# Patient Record
Sex: FEMALE | Race: WHITE | NOT HISPANIC OR LATINO | URBAN - METROPOLITAN AREA
[De-identification: names, ages, dates, MRNs, and addresses within clinical notes are randomized per-mention and may not be internally consistent; named-entity substitution may affect disease eponyms.]

---

## 2018-01-24 ENCOUNTER — ON CAMPUS - OUTPATIENT (OUTPATIENT)
Dept: URBAN - METROPOLITAN AREA HOSPITAL 77 | Facility: HOSPITAL | Age: 73
End: 2018-01-24

## 2018-01-24 DIAGNOSIS — K57.90 DIVERTICULOSIS OF INTESTINE, PART UNSPECIFIED, WITHOUT PERFO: ICD-10-CM

## 2018-01-24 DIAGNOSIS — K62.1 RECTAL POLYP: ICD-10-CM

## 2018-01-24 DIAGNOSIS — Z12.11 ENCOUNTER FOR SCREENING FOR MALIGNANT NEOPLASM OF COLON: ICD-10-CM

## 2018-01-24 PROCEDURE — 45380 COLONOSCOPY AND BIOPSY: CPT | Performed by: INTERNAL MEDICINE

## 2018-05-18 ENCOUNTER — HOSPITAL ENCOUNTER (OUTPATIENT)
Dept: FAMILY MEDICINE CLINIC | Facility: CLINIC | Age: 73
Setting detail: SPECIMEN
Discharge: HOME | End: 2018-05-18
Attending: FAMILY MEDICINE | Admitting: FAMILY MEDICINE

## 2018-05-18 LAB
ALBUMIN SERPL-MCNC: 3.8 G/DL (ref 3.5–4.8)
ALBUMIN/GLOB SERPL: 1.5 {RATIO} (ref 1–1.7)
ALP SERPL-CCNC: 59 IU/L (ref 32–91)
ALT SERPL-CCNC: 19 IU/L (ref 14–54)
ANION GAP SERPL CALC-SCNC: 9.6 MMOL/L (ref 10–20)
AST SERPL-CCNC: 21 IU/L (ref 15–41)
BILIRUB SERPL-MCNC: 0.6 MG/DL (ref 0.3–1.2)
BUN SERPL-MCNC: 21 MG/DL (ref 8–20)
BUN/CREAT SERPL: 21 (ref 5.4–26.2)
CALCIUM SERPL-MCNC: 10.5 MG/DL (ref 8.9–10.3)
CHLORIDE SERPL-SCNC: 108 MMOL/L (ref 101–111)
CHOLEST SERPL-MCNC: 205 MG/DL
CHOLEST/HDLC SERPL: 4 {RATIO}
CONV CO2: 28 MMOL/L (ref 22–32)
CONV LDL CHOLESTEROL DIRECT: 137 MG/DL (ref 0–100)
CONV TOTAL PROTEIN: 6.4 G/DL (ref 6.1–7.9)
CREAT UR-MCNC: 1 MG/DL (ref 0.4–1)
GLOBULIN UR ELPH-MCNC: 2.6 G/DL (ref 2.5–3.8)
GLUCOSE SERPL-MCNC: 91 MG/DL (ref 65–99)
HDLC SERPL-MCNC: 51 MG/DL
LDLC/HDLC SERPL: 2.7 {RATIO}
LIPID INTERPRETATION: ABNORMAL
POTASSIUM SERPL-SCNC: 4.6 MMOL/L (ref 3.6–5.1)
SODIUM SERPL-SCNC: 141 MMOL/L (ref 136–144)
TRIGL SERPL-MCNC: 100 MG/DL
VLDLC SERPL CALC-MCNC: 17.4 MG/DL

## 2018-07-26 ENCOUNTER — HOSPITAL ENCOUNTER (OUTPATIENT)
Dept: FAMILY MEDICINE CLINIC | Facility: CLINIC | Age: 73
Setting detail: SPECIMEN
Discharge: HOME | End: 2018-07-26
Attending: FAMILY MEDICINE | Admitting: FAMILY MEDICINE

## 2018-07-26 LAB
CHOLEST SERPL-MCNC: 211 MG/DL
CHOLEST/HDLC SERPL: 3.8 {RATIO}
CONV LDL CHOLESTEROL DIRECT: 133 MG/DL (ref 0–100)
HDLC SERPL-MCNC: 55 MG/DL
LDLC/HDLC SERPL: 2.4 {RATIO}
LIPID INTERPRETATION: ABNORMAL
TRIGL SERPL-MCNC: 66 MG/DL
VLDLC SERPL CALC-MCNC: 23.5 MG/DL

## 2019-06-11 ENCOUNTER — HOSPITAL ENCOUNTER (OUTPATIENT)
Dept: FAMILY MEDICINE CLINIC | Facility: CLINIC | Age: 74
Setting detail: SPECIMEN
Discharge: HOME | End: 2019-06-11
Attending: PHYSICIAN ASSISTANT | Admitting: PHYSICIAN ASSISTANT

## 2019-06-11 LAB
BACTERIA SPEC AEROBE CULT: NORMAL
Lab: NORMAL
MICRO REPORT STATUS: NORMAL
SPECIMEN SOURCE: NORMAL

## 2019-07-10 RX ORDER — AMLODIPINE BESYLATE 5 MG/1
TABLET ORAL
Qty: 30 TABLET | Refills: 0 | Status: SHIPPED | OUTPATIENT
Start: 2019-07-10 | End: 2019-08-12 | Stop reason: SDUPTHER

## 2019-07-11 RX ORDER — AMLODIPINE BESYLATE 5 MG/1
TABLET ORAL
Qty: 30 TABLET | Refills: 0 | OUTPATIENT
Start: 2019-07-11

## 2019-08-13 RX ORDER — LORATADINE 10 MG/1
TABLET ORAL EVERY 24 HOURS
COMMUNITY
Start: 2019-06-11 | End: 2019-08-15 | Stop reason: SDUPTHER

## 2019-08-13 RX ORDER — VIT C/B6/B5/MAGNESIUM/HERB 173 50-5-6-5MG
CAPSULE ORAL
COMMUNITY
Start: 2019-06-11 | End: 2020-01-24

## 2019-08-14 RX ORDER — AMLODIPINE BESYLATE 5 MG/1
TABLET ORAL
Qty: 21 TABLET | Refills: 0 | Status: SHIPPED | OUTPATIENT
Start: 2019-08-14 | End: 2019-08-15 | Stop reason: SDUPTHER

## 2019-08-15 RX ORDER — LORATADINE 10 MG/1
10 TABLET ORAL EVERY 24 HOURS
Qty: 21 TABLET | Refills: 0 | Status: SHIPPED | OUTPATIENT
Start: 2019-08-15 | End: 2019-08-26 | Stop reason: SDUPTHER

## 2019-08-15 RX ORDER — AMLODIPINE BESYLATE 5 MG/1
TABLET ORAL
Qty: 21 TABLET | Refills: 0 | Status: SHIPPED | OUTPATIENT
Start: 2019-08-15 | End: 2019-08-26 | Stop reason: SDUPTHER

## 2019-08-26 ENCOUNTER — OFFICE VISIT (OUTPATIENT)
Dept: FAMILY MEDICINE CLINIC | Facility: CLINIC | Age: 74
End: 2019-08-26

## 2019-08-26 VITALS
TEMPERATURE: 98.7 F | OXYGEN SATURATION: 100 % | RESPIRATION RATE: 12 BRPM | BODY MASS INDEX: 27.14 KG/M2 | HEART RATE: 69 BPM | DIASTOLIC BLOOD PRESSURE: 82 MMHG | HEIGHT: 64 IN | SYSTOLIC BLOOD PRESSURE: 147 MMHG | WEIGHT: 159 LBS

## 2019-08-26 DIAGNOSIS — E55.9 VITAMIN D DEFICIENCY: ICD-10-CM

## 2019-08-26 DIAGNOSIS — E78.2 MIXED HYPERLIPIDEMIA: ICD-10-CM

## 2019-08-26 DIAGNOSIS — Z12.39 BREAST CANCER SCREENING: ICD-10-CM

## 2019-08-26 DIAGNOSIS — I10 ESSENTIAL HYPERTENSION: Primary | ICD-10-CM

## 2019-08-26 PROBLEM — M51.369 DEGENERATION OF INTERVERTEBRAL DISC OF LUMBAR REGION: Status: ACTIVE | Noted: 2019-08-26

## 2019-08-26 PROBLEM — I34.0 MITRAL REGURGITATION: Status: ACTIVE | Noted: 2019-08-26

## 2019-08-26 PROBLEM — IMO0002 BODY MASS INDEX (BMI) OF 25.0 TO 29.9: Status: ACTIVE | Noted: 2018-10-25

## 2019-08-26 PROBLEM — M51.36 DEGENERATION OF INTERVERTEBRAL DISC OF LUMBAR REGION: Status: ACTIVE | Noted: 2019-08-26

## 2019-08-26 PROBLEM — J02.9 ACUTE PHARYNGITIS: Status: ACTIVE | Noted: 2018-03-26

## 2019-08-26 PROBLEM — E78.5 DYSLIPIDEMIA: Status: ACTIVE | Noted: 2019-08-26

## 2019-08-26 PROBLEM — Z23 INFLUENZA VACCINATION GIVEN: Status: ACTIVE | Noted: 2018-10-25

## 2019-08-26 PROBLEM — C67.9 BLADDER CANCER (HCC): Status: ACTIVE | Noted: 2017-02-16

## 2019-08-26 PROBLEM — J31.0 RHINITIS: Status: ACTIVE | Noted: 2018-03-26

## 2019-08-26 PROBLEM — R82.90 ABNORMAL URINALYSIS: Status: ACTIVE | Noted: 2019-06-11

## 2019-08-26 PROBLEM — M54.50 LOW BACK PAIN: Status: ACTIVE | Noted: 2019-06-11

## 2019-08-26 PROBLEM — J98.4 DISORDER OF LUNG: Status: ACTIVE | Noted: 2019-08-26

## 2019-08-26 PROBLEM — J32.9 SINUS INFECTION: Status: ACTIVE | Noted: 2018-03-26

## 2019-08-26 PROBLEM — C64.9 CARCINOMA, RENAL CELL: Status: ACTIVE | Noted: 2019-08-26

## 2019-08-26 PROCEDURE — 99213 OFFICE O/P EST LOW 20 MIN: CPT | Performed by: FAMILY MEDICINE

## 2019-08-26 RX ORDER — CETIRIZINE HYDROCHLORIDE 10 MG/1
10 TABLET ORAL DAILY
COMMUNITY
End: 2021-07-27

## 2019-08-26 RX ORDER — AMLODIPINE BESYLATE 5 MG/1
TABLET ORAL
Qty: 90 TABLET | Refills: 0 | Status: SHIPPED | OUTPATIENT
Start: 2019-08-26 | End: 2019-11-26 | Stop reason: SDUPTHER

## 2019-08-26 RX ORDER — HYDROXYZINE PAMOATE 25 MG/1
25 CAPSULE ORAL 3 TIMES DAILY PRN
Qty: 90 CAPSULE | Refills: 3 | Status: SHIPPED | OUTPATIENT
Start: 2019-08-26 | End: 2020-09-29

## 2019-08-26 RX ORDER — HYDROXYZINE PAMOATE 25 MG/1
1 CAPSULE ORAL 3 TIMES DAILY PRN
Refills: 0 | COMMUNITY
Start: 2019-06-28 | End: 2019-08-26 | Stop reason: SDUPTHER

## 2019-09-03 ENCOUNTER — CLINICAL SUPPORT (OUTPATIENT)
Dept: FAMILY MEDICINE CLINIC | Facility: CLINIC | Age: 74
End: 2019-09-03

## 2019-09-03 DIAGNOSIS — R73.9 HYPERGLYCEMIA: ICD-10-CM

## 2019-09-03 DIAGNOSIS — E78.2 MIXED HYPERLIPIDEMIA: ICD-10-CM

## 2019-09-03 DIAGNOSIS — Z13.1 DIABETES MELLITUS SCREENING: Primary | ICD-10-CM

## 2019-09-03 DIAGNOSIS — I10 ESSENTIAL HYPERTENSION: ICD-10-CM

## 2019-09-03 DIAGNOSIS — E55.9 VITAMIN D DEFICIENCY: ICD-10-CM

## 2019-09-03 LAB
ALBUMIN SERPL-MCNC: 4 G/DL (ref 3.5–4.8)
ALBUMIN/GLOB SERPL: 1.9 G/DL (ref 1–1.7)
ALP SERPL-CCNC: 56 U/L (ref 32–91)
ALT SERPL W P-5'-P-CCNC: 16 U/L (ref 14–54)
ANION GAP SERPL CALCULATED.3IONS-SCNC: 14.3 MMOL/L (ref 5–15)
ARTICHOKE IGE QN: 155 MG/DL (ref 0–100)
AST SERPL-CCNC: 17 U/L (ref 15–41)
BILIRUB SERPL-MCNC: 0.9 MG/DL (ref 0.3–1.2)
BUN BLD-MCNC: 20 MG/DL (ref 8–20)
BUN/CREAT SERPL: 20 (ref 5.4–26.2)
CALCIUM SPEC-SCNC: 10 MG/DL (ref 8.9–10.3)
CHLORIDE SERPL-SCNC: 103 MMOL/L (ref 101–111)
CHOLEST SERPL-MCNC: 234 MG/DL
CO2 SERPL-SCNC: 26 MMOL/L (ref 22–32)
CREAT BLD-MCNC: 1 MG/DL (ref 0.4–1)
GFR SERPL CREATININE-BSD FRML MDRD: 55 ML/MIN/1.73
GLOBULIN UR ELPH-MCNC: 2.1 GM/DL (ref 2.5–3.8)
GLUCOSE BLD-MCNC: 90 MG/DL (ref 65–99)
HDLC SERPL QL: 3.9
HDLC SERPL-MCNC: 60 MG/DL
LDLC/HDLC SERPL: 2.69 {RATIO}
POTASSIUM BLD-SCNC: 5.3 MMOL/L (ref 3.6–5.1)
PROT SERPL-MCNC: 6.1 G/DL (ref 6.1–7.9)
SODIUM BLD-SCNC: 138 MMOL/L (ref 136–144)
TRIGL SERPL-MCNC: 64 MG/DL
VLDLC SERPL-MCNC: 12.8 MG/DL

## 2019-09-03 PROCEDURE — 80053 COMPREHEN METABOLIC PANEL: CPT | Performed by: FAMILY MEDICINE

## 2019-09-03 PROCEDURE — 82306 VITAMIN D 25 HYDROXY: CPT | Performed by: FAMILY MEDICINE

## 2019-09-03 PROCEDURE — 36415 COLL VENOUS BLD VENIPUNCTURE: CPT | Performed by: FAMILY MEDICINE

## 2019-09-03 PROCEDURE — 83036 HEMOGLOBIN GLYCOSYLATED A1C: CPT | Performed by: FAMILY MEDICINE

## 2019-09-03 PROCEDURE — 80061 LIPID PANEL: CPT | Performed by: FAMILY MEDICINE

## 2019-09-04 LAB — HBA1C MFR BLD: 5.2 % (ref 3.5–5.6)

## 2019-09-05 ENCOUNTER — HOSPITAL ENCOUNTER (OUTPATIENT)
Dept: MAMMOGRAPHY | Facility: HOSPITAL | Age: 74
Discharge: HOME | End: 2019-09-05
Admitting: FAMILY MEDICINE

## 2019-09-05 DIAGNOSIS — Z12.39 BREAST CANCER SCREENING: ICD-10-CM

## 2019-09-05 DIAGNOSIS — E87.5 HYPERKALEMIA: Primary | ICD-10-CM

## 2019-09-05 LAB — 25(OH)D3 SERPL-MCNC: 41.9 NG/ML (ref 30–100)

## 2019-09-05 PROCEDURE — 77063 BREAST TOMOSYNTHESIS BI: CPT

## 2019-09-05 PROCEDURE — 77067 SCR MAMMO BI INCL CAD: CPT

## 2019-09-11 ENCOUNTER — CLINICAL SUPPORT (OUTPATIENT)
Dept: FAMILY MEDICINE CLINIC | Facility: CLINIC | Age: 74
End: 2019-09-11

## 2019-09-11 DIAGNOSIS — E87.5 HYPERKALEMIA: ICD-10-CM

## 2019-09-11 LAB
ANION GAP SERPL CALCULATED.3IONS-SCNC: 12 MMOL/L (ref 5–15)
BUN BLD-MCNC: 18 MG/DL (ref 8–20)
BUN/CREAT SERPL: 18 (ref 5.4–26.2)
CALCIUM SPEC-SCNC: 10 MG/DL (ref 8.9–10.3)
CHLORIDE SERPL-SCNC: 104 MMOL/L (ref 101–111)
CO2 SERPL-SCNC: 29 MMOL/L (ref 22–32)
CREAT BLD-MCNC: 1 MG/DL (ref 0.4–1)
GFR SERPL CREATININE-BSD FRML MDRD: 55 ML/MIN/1.73
GLUCOSE BLD-MCNC: 70 MG/DL (ref 65–99)
POTASSIUM BLD-SCNC: 5 MMOL/L (ref 3.6–5.1)
SODIUM BLD-SCNC: 140 MMOL/L (ref 136–144)

## 2019-09-11 PROCEDURE — 80048 BASIC METABOLIC PNL TOTAL CA: CPT | Performed by: FAMILY MEDICINE

## 2019-09-11 PROCEDURE — 36415 COLL VENOUS BLD VENIPUNCTURE: CPT | Performed by: FAMILY MEDICINE

## 2019-09-13 ENCOUNTER — HOSPITAL ENCOUNTER (OUTPATIENT)
Dept: OTHER | Facility: HOSPITAL | Age: 74
Discharge: HOME | End: 2019-09-13

## 2019-09-13 DIAGNOSIS — Z00.6 EXAMINATION FOR NORMAL COMPARISON OR CONTROL IN CLINICAL RESEARCH: ICD-10-CM

## 2019-11-26 RX ORDER — AMLODIPINE BESYLATE 5 MG/1
TABLET ORAL
Qty: 90 TABLET | Refills: 0 | Status: SHIPPED | OUTPATIENT
Start: 2019-11-26 | End: 2020-01-24 | Stop reason: SDUPTHER

## 2019-11-26 NOTE — TELEPHONE ENCOUNTER
Last visit: 9/11/19  Next visit:2/24/2020  Last labs: 9/11/19    Rx requested:amLODIPine  Pharmacy: [facility] in [place]

## 2019-12-03 RX ORDER — AMLODIPINE BESYLATE 5 MG/1
TABLET ORAL
Qty: 90 TABLET | Refills: 0 | OUTPATIENT
Start: 2019-12-03

## 2020-01-23 PROBLEM — Z12.31 OTHER SCREENING MAMMOGRAM: Status: ACTIVE | Noted: 2018-07-20

## 2020-01-23 PROBLEM — R00.2 PALPITATIONS: Status: ACTIVE | Noted: 2019-03-18

## 2020-01-23 PROBLEM — N39.0 URINARY TRACT INFECTION: Status: ACTIVE | Noted: 2019-06-11

## 2020-01-24 ENCOUNTER — OFFICE VISIT (OUTPATIENT)
Dept: FAMILY MEDICINE CLINIC | Facility: CLINIC | Age: 75
End: 2020-01-24

## 2020-01-24 VITALS
BODY MASS INDEX: 27.14 KG/M2 | TEMPERATURE: 98.4 F | HEIGHT: 64 IN | DIASTOLIC BLOOD PRESSURE: 80 MMHG | WEIGHT: 159 LBS | OXYGEN SATURATION: 100 % | SYSTOLIC BLOOD PRESSURE: 158 MMHG | HEART RATE: 82 BPM | RESPIRATION RATE: 14 BRPM

## 2020-01-24 DIAGNOSIS — I10 ELEVATED BLOOD PRESSURE READING WITH DIAGNOSIS OF HYPERTENSION: Primary | ICD-10-CM

## 2020-01-24 DIAGNOSIS — C64.9 RENAL CELL CARCINOMA, UNSPECIFIED LATERALITY (HCC): ICD-10-CM

## 2020-01-24 DIAGNOSIS — C67.9 MALIGNANT NEOPLASM OF URINARY BLADDER, UNSPECIFIED SITE (HCC): ICD-10-CM

## 2020-01-24 PROCEDURE — 99213 OFFICE O/P EST LOW 20 MIN: CPT | Performed by: FAMILY MEDICINE

## 2020-01-24 RX ORDER — BIOTIN 10 MG
1 TABLET ORAL DAILY
COMMUNITY
End: 2022-09-20

## 2020-01-24 RX ORDER — AMLODIPINE BESYLATE 5 MG/1
TABLET ORAL
Qty: 90 TABLET | Refills: 1 | Status: SHIPPED | OUTPATIENT
Start: 2020-01-24 | End: 2020-09-03

## 2020-01-24 NOTE — PROGRESS NOTES
Subjective   Sandra Gonzalez is a 74 y.o. female.     Chief Complaint   Patient presents with   • Alopecia     Her iron level was too high, go over blood work results.          Current Outpatient Medications:   •  amLODIPine (NORVASC) 5 MG tablet, TAKE 1 TABLET BY MOUTH ONE TIME A DAY, Disp: 90 tablet, Rfl: 1  •  B Complex-C (SUPER B COMPLEX PO), Take 1 tablet by mouth Daily., Disp: , Rfl:   •  Biotin 10 MG tablet, Take 1 tablet by mouth Daily., Disp: , Rfl:   •  calcium carb-cholecalciferol (CALCIUM 600+D) 600-800 MG-UNIT tablet, Daily., Disp: , Rfl:   •  cetirizine (zyrTEC) 10 MG tablet, Take 10 mg by mouth Daily., Disp: , Rfl:   •  Cholecalciferol (KP VITAMIN D3) 2000 units capsule, 1 capsule Daily., Disp: , Rfl:   •  hydrOXYzine pamoate (VISTARIL) 25 MG capsule, Take 1 capsule by mouth 3 (Three) Times a Day As Needed for Itching or Allergies., Disp: 90 capsule, Rfl: 3    Past Medical History:   Diagnosis Date   • Achilles tendinitis    • Allergic rhinitis    • Bladder cancer (CMS/HCC)     2016/ 2019 Bladder Tumor Sx   • DDD (degenerative disc disease), lumbar    • Hyperlipidemia    • Hypertension    • Lower back pain    • Mitral valve prolapse    • Pulmonary nodule    • Rheumatic fever    • Vitamin D deficiency        Past Surgical History:   Procedure Laterality Date   • ADENOIDECTOMY     • APPENDECTOMY     • BLADDER TUMOR EXCISION  05/2019    x 2   • CHOLECYSTECTOMY     • COLONOSCOPY  07/2018    recheck in 2023 at [facility]   • NEPHRECTOMY      left nephrectomy   • TONSILLECTOMY     • TOTAL ABDOMINAL HYSTERECTOMY WITH SALPINGO OOPHORECTOMY         Family History   Problem Relation Age of Onset   • Cancer Mother         gallbladder cancer   • Cancer Father         cancerour tumor in brain   • No Known Problems Sister    • No Known Problems Brother        Social History     Socioeconomic History   • Marital status:      Spouse name: Not on file   • Number of children: Not on file   • Years of education: Not on  file   • Highest education level: Not on file   Tobacco Use   • Smoking status: Never Smoker   • Smokeless tobacco: Never Used   Substance and Sexual Activity   • Alcohol use: No     Frequency: Never   • Drug use: No   • Sexual activity: Defer       73 y/o C female here for f/u on labs done by DERM-----    Pt saw them for Alopecia and they found her Ferritin was too high-----she was told to use Rogaine but they also wanted her to f/u w/ PCP; Once pt looked into the med, she decided not to try it    Pt sees Urology for f/u on her RCC/ Bladder Cancer and since they found her second bladder cancer, she will see them annually (it was removed)    Pt's BP was in the normal range while in [place] recently when she went to  (She was given Augmentin and got hives so then they gave her 3 days of ZPak) ---- Pt now taking some otc Pseud        The following portions of the patient's history were reviewed and updated as appropriate: allergies, current medications, past family history, past medical history, past social history, past surgical history and problem list.    Review of Systems   Constitutional: Negative for activity change, fatigue and unexpected weight gain.   HENT: Positive for congestion.    Respiratory: Negative for cough, chest tightness and shortness of breath.    Cardiovascular: Negative for chest pain, palpitations and leg swelling.   Musculoskeletal: Negative for arthralgias and myalgias.   Skin: Negative for rash.   Neurological: Negative for dizziness, facial asymmetry, speech difficulty, weakness, light-headedness, headache, memory problem and confusion.       Vitals:    01/24/20 1300   BP: 158/80   Pulse: 82   Resp: 14   Temp: 98.4 °F (36.9 °C)   SpO2: 100%       Objective   Physical Exam   Constitutional: She is oriented to person, place, and time. She appears well-developed and well-nourished.   HENT:   Head: Normocephalic and atraumatic.   Neck: Normal range of motion. Neck supple.   Cardiovascular: Normal  rate, regular rhythm, normal heart sounds and intact distal pulses.   No murmur heard.  Pulmonary/Chest: Effort normal and breath sounds normal. No respiratory distress.   Musculoskeletal: She exhibits no edema.   Neurological: She is alert and oriented to person, place, and time. No cranial nerve deficit.   Skin: Skin is warm and dry. No rash noted.   Psychiatric: She has a normal mood and affect. Her behavior is normal. Judgment and thought content normal.   Nursing note and vitals reviewed.        Assessment/Plan   Sandra was seen today for alopecia.    Diagnoses and all orders for this visit:    Elevated blood pressure reading with diagnosis of hypertension  -     Ferritin    Renal cell carcinoma, unspecified laterality (CMS/HCC)  -     Calcium, Ionized; Future    Malignant neoplasm of urinary bladder, unspecified site (CMS/HCC)    Other orders  -     amLODIPine (NORVASC) 5 MG tablet; TAKE 1 TABLET BY MOUTH ONE TIME A DAY

## 2020-01-28 ENCOUNTER — TELEPHONE (OUTPATIENT)
Dept: FAMILY MEDICINE CLINIC | Facility: CLINIC | Age: 75
End: 2020-01-28

## 2020-01-28 NOTE — TELEPHONE ENCOUNTER
----- Message from Natalie Garcia DO sent at 1/26/2020  5:33 PM EST -----  Call pt and see if she is willing to repeat the abn labs and if still off, can f/u w/ HEME/ ONC to see what they think

## 2020-01-28 NOTE — TELEPHONE ENCOUNTER
Patient was notified and was routed up front to [facility] to schedule the patient for the blood work.

## 2020-01-29 ENCOUNTER — CLINICAL SUPPORT (OUTPATIENT)
Dept: FAMILY MEDICINE CLINIC | Facility: CLINIC | Age: 75
End: 2020-01-29

## 2020-01-29 DIAGNOSIS — C64.9 RENAL CELL CARCINOMA, UNSPECIFIED LATERALITY (HCC): ICD-10-CM

## 2020-01-29 PROCEDURE — 82728 ASSAY OF FERRITIN: CPT | Performed by: FAMILY MEDICINE

## 2020-01-29 PROCEDURE — 82330 ASSAY OF CALCIUM: CPT | Performed by: FAMILY MEDICINE

## 2020-01-29 PROCEDURE — 36415 COLL VENOUS BLD VENIPUNCTURE: CPT | Performed by: FAMILY MEDICINE

## 2020-01-30 ENCOUNTER — TELEPHONE (OUTPATIENT)
Dept: FAMILY MEDICINE CLINIC | Facility: CLINIC | Age: 75
End: 2020-01-30

## 2020-01-30 ENCOUNTER — PATIENT MESSAGE (OUTPATIENT)
Dept: FAMILY MEDICINE CLINIC | Facility: CLINIC | Age: 75
End: 2020-01-30

## 2020-01-30 DIAGNOSIS — E83.52 SERUM CALCIUM ELEVATED: Primary | ICD-10-CM

## 2020-01-30 DIAGNOSIS — C64.9 RENAL CELL CARCINOMA, UNSPECIFIED LATERALITY (HCC): ICD-10-CM

## 2020-01-30 DIAGNOSIS — R79.89 ELEVATED FERRITIN LEVEL: ICD-10-CM

## 2020-01-30 LAB
CA-I BLD-MCNC: 6.2 MG/DL (ref 4.6–5.4)
CA-I SERPL ISE-MCNC: 1.56 MMOL/L (ref 1.15–1.35)
FERRITIN SERPL-MCNC: 279 NG/ML (ref 13–150)

## 2020-01-30 NOTE — TELEPHONE ENCOUNTER
Patient was notified of the lab results. Patient sees  for urology and she does not have a Onc/Hem doctor but will go to one if you refer her. I told the patient I would let  know and they will call her to schedule appt.

## 2020-01-30 NOTE — TELEPHONE ENCOUNTER
Pt read on [employer] about the lab results and stopped in to ask where she is being referred to because she is going out of town soon

## 2020-01-31 ENCOUNTER — TELEPHONE (OUTPATIENT)
Dept: ONCOLOGY | Facility: CLINIC | Age: 75
End: 2020-01-31

## 2020-02-17 ENCOUNTER — CONSULT (OUTPATIENT)
Dept: ONCOLOGY | Facility: CLINIC | Age: 75
End: 2020-02-17

## 2020-02-17 ENCOUNTER — APPOINTMENT (OUTPATIENT)
Dept: LAB | Facility: HOSPITAL | Age: 75
End: 2020-02-17

## 2020-02-17 VITALS
BODY MASS INDEX: 27.49 KG/M2 | WEIGHT: 161 LBS | HEIGHT: 64 IN | DIASTOLIC BLOOD PRESSURE: 91 MMHG | RESPIRATION RATE: 18 BRPM | SYSTOLIC BLOOD PRESSURE: 156 MMHG | TEMPERATURE: 99.1 F | HEART RATE: 83 BPM

## 2020-02-17 DIAGNOSIS — R79.89 HIGH SERUM CALCIUM: Primary | ICD-10-CM

## 2020-02-17 DIAGNOSIS — R79.89 HIGH SERUM FERRITIN: ICD-10-CM

## 2020-02-17 DIAGNOSIS — Z85.9 HISTORY OF CANCER: ICD-10-CM

## 2020-02-17 LAB
BASOPHILS # BLD AUTO: 0.03 10*3/MM3 (ref 0–0.2)
BASOPHILS NFR BLD AUTO: 0.4 % (ref 0–1.5)
CRP SERPL-MCNC: 0.06 MG/DL (ref 0–0.5)
DEPRECATED RDW RBC AUTO: 44.1 FL (ref 37–54)
EOSINOPHIL # BLD AUTO: 0.14 10*3/MM3 (ref 0–0.4)
EOSINOPHIL NFR BLD AUTO: 1.8 % (ref 0.3–6.2)
ERYTHROCYTE [DISTWIDTH] IN BLOOD BY AUTOMATED COUNT: 13.3 % (ref 12.3–15.4)
ERYTHROCYTE [SEDIMENTATION RATE] IN BLOOD: 10 MM/HR (ref 0–30)
HCT VFR BLD AUTO: 42.1 % (ref 34–46.6)
HGB BLD-MCNC: 13.9 G/DL (ref 12–15.9)
LYMPHOCYTES # BLD AUTO: 2.54 10*3/MM3 (ref 0.7–3.1)
LYMPHOCYTES NFR BLD AUTO: 32.6 % (ref 19.6–45.3)
MCH RBC QN AUTO: 30.7 PG (ref 26.6–33)
MCHC RBC AUTO-ENTMCNC: 33 G/DL (ref 31.5–35.7)
MCV RBC AUTO: 92.9 FL (ref 79–97)
MONOCYTES # BLD AUTO: 0.55 10*3/MM3 (ref 0.1–0.9)
MONOCYTES NFR BLD AUTO: 7.1 % (ref 5–12)
NEUTROPHILS # BLD AUTO: 4.53 10*3/MM3 (ref 1.7–7)
NEUTROPHILS NFR BLD AUTO: 58.1 % (ref 42.7–76)
PLATELET # BLD AUTO: 224 10*3/MM3 (ref 140–450)
PMV BLD AUTO: 9.7 FL (ref 6–12)
PTH-INTACT SERPL-MCNC: 121.7 PG/ML (ref 15–65)
RBC # BLD AUTO: 4.53 10*6/MM3 (ref 3.77–5.28)
WBC NRBC COR # BLD: 7.79 10*3/MM3 (ref 3.4–10.8)

## 2020-02-17 PROCEDURE — 36415 COLL VENOUS BLD VENIPUNCTURE: CPT | Performed by: INTERNAL MEDICINE

## 2020-02-17 PROCEDURE — 85025 COMPLETE CBC W/AUTO DIFF WBC: CPT | Performed by: INTERNAL MEDICINE

## 2020-02-17 PROCEDURE — 83970 ASSAY OF PARATHORMONE: CPT | Performed by: NURSE PRACTITIONER

## 2020-02-17 PROCEDURE — 99205 OFFICE O/P NEW HI 60 MIN: CPT | Performed by: INTERNAL MEDICINE

## 2020-02-17 PROCEDURE — 85652 RBC SED RATE AUTOMATED: CPT | Performed by: NURSE PRACTITIONER

## 2020-02-17 PROCEDURE — 86140 C-REACTIVE PROTEIN: CPT | Performed by: NURSE PRACTITIONER

## 2020-02-17 PROCEDURE — 82784 ASSAY IGA/IGD/IGG/IGM EACH: CPT | Performed by: NURSE PRACTITIONER

## 2020-02-17 NOTE — PROGRESS NOTES
Hematology/Oncology Outpatient Consultation    Patient name: Sandra Gonzalez  : 1945  MRN: [id number]  Primary Care Physician: Natalie Garcia DO  Referring Physician: Natalie Garcia DO  Reason For Consult:     Chief Complaint   Patient presents with   • Consult     High Ferritin   • Consult     High Calcium       History of Present Illness:    This is a 74-year-old female who is here today due to elevated calcium level in her blood.  Patient states that she has been on oral calcium supplementation along with vitamin D.  On a routine biochemical testing she was found to have elevated calcium to 11.  An ionized calcium level was noted to be 1.5.  Patient denies any night sweats or fatigue symptoms.  She has been on weight watchers diet and has lost approximately more than 20 pounds intentionally.  She has occasional right hip pain right joint aching pain.  Overall she feels well otherwise.  Also on her routine lab test was found to have an elevated ferritin to 279 on 2020.  She has no history of iron overload issues.  Her serum iron and  serum iron saturation were normal.  There is no family history of liver cancer, cirrhosis of the liver.    We have her labs from   Which showed her white count was 6.2, hemoglobin 13.3 and platelets were 236, her differentials where 57% neutrophils, 34% lymphocytes, there was no monocytosis, eosinophilia or basophilia.  BUN was 17, creatinine 0.93 and serum calcium was 11.  She has normal total protein and albumin levels.  Liver function tests were also unremarkable.  Serum iron was normal at 86, iron saturation was normal at 28.  TSH was normal and vitamin D1 was also normal at 43.1.  Sed rate was 6.    Patient also has a history of bladder cancer, kidney cancer and cervical cancer.  Past Medical History:   Diagnosis Date   • Achilles tendinitis    • Allergic rhinitis    • Bladder cancer (CMS/HCC)     2019 Bladder Tumor Sx   • Cancer of kidney  (CMS/HCC)    • DDD (degenerative disc disease), lumbar    • Hyperlipidemia    • Hypertension    • Lower back pain    • Mitral valve prolapse    • Pulmonary nodule    • Rheumatic fever    • Vitamin D deficiency        Past Surgical History:   Procedure Laterality Date   • ADENOIDECTOMY     • APPENDECTOMY     • BLADDER TUMOR EXCISION  05/2019    x 2   • CHOLECYSTECTOMY     • COLONOSCOPY  07/2018    [facility]check in 2023 at [facility]   • NEPHRECTOMY  09/2009    left nephrectomy   • TONSILLECTOMY     • TOTAL ABDOMINAL HYSTERECTOMY WITH SALPINGO OOPHORECTOMY           Current Outpatient Medications:   •  amLODIPine (NORVASC) 5 MG tablet, TAKE 1 TABLET BY MOUTH ONE TIME A DAY, Disp: 90 tablet, Rfl: 1  •  B Complex-C (SUPER B COMPLEX PO), Take 1 tablet by mouth Daily., Disp: , Rfl:   •  Biotin 10 MG tablet, Take 1 tablet by mouth Daily., Disp: , Rfl:   •  calcium carb-cholecalciferol (CALCIUM 600+D) 600-800 MG-UNIT tablet, Daily., Disp: , Rfl:   •  cetirizine (zyrTEC) 10 MG tablet, Take 10 mg by mouth Daily., Disp: , Rfl:   •  Cholecalciferol (KP VITAMIN D3) 2000 units capsule, 1 capsule Daily., Disp: , Rfl:   •  hydrOXYzine pamoate (VISTARIL) 25 MG capsule, Take 1 capsule by mouth 3 (Three) Times a Day As Needed for Itching or Allergies., Disp: 90 capsule, Rfl: 3    Allergies   Allergen Reactions   • B.F.I. Rash     [employer] [place]   • Benzocaine Rash   • Amoxicillin-Pot Clavulanate Hives       Immunization History   Administered Date(s) Administered   • Fluzone High Dose =>65 Years ([employer] ONLY) 09/24/2019   • Pneumococcal Polysaccharide (PPSV23) 01/01/2013   • Zostavax 11/01/2014       Family History   Problem Relation Age of Onset   • Cancer Mother         gallbladder cancer   • Brain cancer Father 77   • No Known Problems Sister    • No Known Problems Brother        Cancer-related family history includes Brain cancer (age of onset: 77) in her father; Cancer in her mother.    Social History     Tobacco Use   • Smoking status:  "Never Smoker   • Smokeless tobacco: Never Used   Substance Use Topics   • Alcohol use: No     Frequency: Never   • Drug use: No       ROS:    Review of Systems   Constitutional: Negative for chills and fever.   HENT: Negative for ear pain, mouth sores, nosebleeds and sore throat.    Eyes: Negative for photophobia and visual disturbance.   Respiratory: Negative for wheezing and stridor.    Cardiovascular: Negative for chest pain and palpitations.   Gastrointestinal: Negative for abdominal pain, diarrhea, nausea and vomiting.   Endocrine: Negative for cold intolerance and heat intolerance.   Genitourinary: Negative for dysuria and hematuria.   Musculoskeletal: Negative for joint swelling and neck stiffness.   Skin: Negative for color change and rash.   Neurological: Negative for seizures and syncope.   Hematological: Negative for adenopathy.        No obvious bleeding   Psychiatric/Behavioral: Negative for agitation, confusion and hallucinations.   All other systems reviewed and are negative.    Subjective:  The patient is here for a consult appointment for high ferritin and calcium. The patient states that she quit taking the calcium supplements when she was told to stop. She denies any symptoms or problems. The patient stated she has had cancer four times in the past. 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         Objective:    Vitals:    02/17/20 1551   BP: 156/91   Pulse: 83   Resp: 18   Temp: 99.1 °F (37.3 °C)   Weight: 73 kg (161 lb)   Height: 162.6 cm (64\")   PainSc: 0-No pain       ECOG  (0) Fully active, able to carry on all predisease performance without restriction    Physical Exam:    Physical Exam   Constitutional: She is oriented to person, place, and time. No distress.   HENT:   Head: Normocephalic and atraumatic.   Eyes: Conjunctivae and EOM are normal. Right eye exhibits no discharge. Left eye exhibits no " discharge. No scleral icterus.   Neck: Normal range of motion. Neck supple. No thyromegaly present.   Cardiovascular: Normal rate, regular rhythm and normal heart sounds. Exam reveals no gallop and no friction rub.   Pulmonary/Chest: Effort normal. No stridor. No respiratory distress. She has no wheezes.   Abdominal: Soft. Bowel sounds are normal. She exhibits no mass. There is no tenderness. There is no rebound and no guarding.   Musculoskeletal: Normal range of motion. She exhibits no tenderness.   Lymphadenopathy:     She has no cervical adenopathy.   Neurological: She is alert and oriented to person, place, and time. She exhibits normal muscle tone.   Skin: Skin is warm. No rash noted. She is not diaphoretic. No erythema.   Psychiatric: She has a normal mood and affect. Her behavior is normal.   Nursing note and vitals reviewed.      RECENT LABS  WBC   Date Value Ref Range Status   02/17/2020 7.79 3.40 - 10.80 10*3/mm3 Final     RBC   Date Value Ref Range Status   02/17/2020 4.53 3.77 - 5.28 10*6/mm3 Final     Hemoglobin   Date Value Ref Range Status   02/17/2020 13.9 12.0 - 15.9 g/dL Final     Hematocrit   Date Value Ref Range Status   02/17/2020 42.1 34.0 - 46.6 % Final     MCV   Date Value Ref Range Status   02/17/2020 92.9 79.0 - 97.0 fL Final     MCH   Date Value Ref Range Status   02/17/2020 30.7 26.6 - 33.0 pg Final     MCHC   Date Value Ref Range Status   02/17/2020 33.0 31.5 - 35.7 g/dL Final     RDW   Date Value Ref Range Status   02/17/2020 13.3 12.3 - 15.4 % Final     RDW-SD   Date Value Ref Range Status   02/17/2020 44.1 37.0 - 54.0 fl Final     MPV   Date Value Ref Range Status   02/17/2020 9.7 6.0 - 12.0 fL Final     Platelets   Date Value Ref Range Status   02/17/2020 224 140 - 450 10*3/mm3 Final     Neutrophil %   Date Value Ref Range Status   02/17/2020 58.1 42.7 - 76.0 % Final     Lymphocyte %   Date Value Ref Range Status   02/17/2020 32.6 19.6 - 45.3 % Final     Monocyte %   Date Value Ref  Range Status   02/17/2020 7.1 5.0 - 12.0 % Final     Eosinophil %   Date Value Ref Range Status   02/17/2020 1.8 0.3 - 6.2 % Final     Basophil %   Date Value Ref Range Status   02/17/2020 0.4 0.0 - 1.5 % Final     Neutrophils, Absolute   Date Value Ref Range Status   02/17/2020 4.53 1.70 - 7.00 10*3/mm3 Final     Lymphocytes, Absolute   Date Value Ref Range Status   02/17/2020 2.54 0.70 - 3.10 10*3/mm3 Final     Monocytes, Absolute   Date Value Ref Range Status   02/17/2020 0.55 0.10 - 0.90 10*3/mm3 Final     Eosinophils, Absolute   Date Value Ref Range Status   02/17/2020 0.14 0.00 - 0.40 10*3/mm3 Final     Basophils, Absolute   Date Value Ref Range Status   02/17/2020 0.03 0.00 - 0.20 10*3/mm3 Final       Lab Results   Component Value Date    GLUCOSE 70 09/11/2019    BUN 18 09/11/2019    CREATININE 1.00 09/11/2019    EGFRIFNONA 55 (L) 09/11/2019    BCR 18.0 09/11/2019    K 5.0 09/11/2019    CO2 29.0 09/11/2019    CALCIUM 10.0 09/11/2019    ALBUMIN 4.00 09/03/2019    LABIL2 1.5 05/18/2018    AST 17 09/03/2019    ALT 16 09/03/2019         Assessment/Plan     High serum calcium  - CT chest w contrast  - CT abdomen pelvis w contrast  - PTH, Intact  - Sedimentation Rate  - Hemochromatosis Mutation  - C-reactive Protein  - CBC & Differential  - IgA  - IgG  - IgM  - NM Bone Scan Whole Body  - Protein Electrophoresis, Total  - Immunofixation, Serum  - Immunoglobulin Free LT Chains Blood  - CBC Auto Differential    High serum ferritin  - CT chest w contrast  - CT abdomen pelvis w contrast  - PTH, Intact  - Sedimentation Rate  - Hemochromatosis Mutation  - C-reactive Protein  - CBC & Differential  - IgA  - IgG  - IgM  - NM Bone Scan Whole Body  - Protein Electrophoresis, Total  - Immunofixation, Serum  - Immunoglobulin Free LT Chains Blood  - CBC Auto Differential    History of cancer  - CT chest w contrast  - CT abdomen pelvis w contrast  - PTH, Intact  - Sedimentation Rate  - Hemochromatosis Mutation  - C-reactive  Protein  - CBC & Differential  - IgA  - IgG  - IgM  - NM Bone Scan Whole Body  - Protein Electrophoresis, Total  - Immunofixation, Serum  - Immunoglobulin Free LT Chains Blood  - CBC Auto Differential      1. Hypercalcemia which may be iatrogenic but rule out significant disease including hematologic malignancy, or soft tissue malignancy.  2. Elevated ferritin level which may be reactive versus iron overload syndrome.  Patient has normal serum iron, and iron saturation.  3. Personal history of multiple malignancies including kidney cancer, bladder cancer and cervical cancer.  4. Need to rule out metastatic disease resulting in elevated calcium level.    Plans:    For the hypercalcemia.  Patient would remain off calcium supplementation.  Have also recommended a serum PTH level.  She has history of 3 primary cancers including kidney, bladder and cervical cancer therefore it is important to rule out metastatic disease.  I recommended bone scan, CT scans of the chest abdomen and pelvis.  Monoclonal labs will also be done to rule out gammopathy.    For the elevated ferritin level, I have recommended HFE mutational analysis to further evaluate elevated ferritin level.  Also included are sed rate and C-reactive protein.    Explained the above to patient in detail.  We have reviewed potential differential diagnosis.    Thank you very much for allowing me to participate in the care of Ms. Gonzalez, I will keep you updated on her progress      I have reviewed labs results, imaging, vitals, and medications with the patient today.  Will follow up in 3 weeks  with me.        Patient verbalized understanding and is in agreement of the above plan.

## 2020-02-18 LAB
ALBUMIN SERPL-MCNC: 3.9 G/DL (ref 2.9–4.4)
ALBUMIN/GLOB SERPL: 1.4 {RATIO} (ref 0.7–1.7)
ALPHA1 GLOB FLD ELPH-MCNC: 0.2 G/DL (ref 0–0.4)
ALPHA2 GLOB SERPL ELPH-MCNC: 0.7 G/DL (ref 0.4–1)
B-GLOBULIN SERPL ELPH-MCNC: 1 G/DL (ref 0.7–1.3)
GAMMA GLOB SERPL ELPH-MCNC: 0.9 G/DL (ref 0.4–1.8)
GLOBULIN SER CALC-MCNC: 2.8 G/DL (ref 2.2–3.9)
IGA SERPL-MCNC: 164 MG/DL (ref 64–422)
IGA1 MFR SER: 157 MG/DL (ref 70–400)
IGG SERPL-MCNC: 913 MG/DL (ref 700–1600)
IGG1 SER-MCNC: 935 MG/DL (ref 700–1600)
IGM SERPL-MCNC: 70 MG/DL (ref 40–230)
IGM SERPL-MCNC: 75 MG/DL (ref 26–217)
KAPPA LC SERPL-MCNC: 14.4 MG/L (ref 3.3–19.4)
KAPPA LC/LAMBDA SER: 1.13 {RATIO} (ref 0.26–1.65)
LAMBDA LC FREE SERPL-MCNC: 12.8 MG/L (ref 5.7–26.3)
Lab: NORMAL
M-SPIKE: NORMAL G/DL
PROT PATTERN SERPL IFE-IMP: NORMAL
PROT SERPL-MCNC: 6.7 G/DL (ref 6–8.5)

## 2020-02-20 ENCOUNTER — TELEPHONE (OUTPATIENT)
Dept: ONCOLOGY | Facility: CLINIC | Age: 75
End: 2020-02-20

## 2020-02-20 DIAGNOSIS — R79.89 ELEVATED PTHRP LEVEL: Primary | ICD-10-CM

## 2020-02-20 LAB — HFE GENE MUT ANL BLD/T: NORMAL

## 2020-02-20 NOTE — TELEPHONE ENCOUNTER
----- Message from Cinthya Hall MD sent at 2/19/2020  6:30 PM EST -----  Let patient know that she has an elevated parathyroid hormone level.  I would refer her to endocrinology to evaluate.  Please send in a referral to Dr. ADAMS Claudio.

## 2020-02-20 NOTE — TELEPHONE ENCOUNTER
I called the patient to inform her that we put in a referral to Dr. Claudio's office for endocrinology. I told the patient about her lab results and that Dr. Hall is just wanting to have her evaluated. The patient v/u.

## 2020-02-21 ENCOUNTER — HOSPITAL ENCOUNTER (OUTPATIENT)
Dept: NUCLEAR MEDICINE | Facility: HOSPITAL | Age: 75
Discharge: HOME | End: 2020-02-21

## 2020-02-21 ENCOUNTER — TELEPHONE (OUTPATIENT)
Dept: ONCOLOGY | Facility: CLINIC | Age: 75
End: 2020-02-21

## 2020-02-21 DIAGNOSIS — R79.89 ELEVATED PTHRP LEVEL: Primary | ICD-10-CM

## 2020-02-21 PROCEDURE — 0 TECHNETIUM MEDRONATE KIT: Performed by: NURSE PRACTITIONER

## 2020-02-21 PROCEDURE — 78306 BONE IMAGING WHOLE BODY: CPT

## 2020-02-21 PROCEDURE — A9503 TC99M MEDRONATE: HCPCS | Performed by: NURSE PRACTITIONER

## 2020-02-21 RX ORDER — TC 99M MEDRONATE 20 MG/10ML
27.1 INJECTION, POWDER, LYOPHILIZED, FOR SOLUTION INTRAVENOUS
Status: COMPLETED | OUTPATIENT
Start: 2020-02-21 | End: 2020-02-21

## 2020-02-21 RX ADMIN — TC 99M MEDRONATE 27.1 MILLICURIE: 20 INJECTION, POWDER, LYOPHILIZED, FOR SOLUTION INTRAVENOUS at 10:00

## 2020-02-21 NOTE — TELEPHONE ENCOUNTER
I called and informed the patient that we don't have a way to know what she was getting notifications for however some of her labs from 2/17/2020 were send outs and she may be getting notified as those results come in. The patient v/u and stated she got her bone scan today and was told Dr. Hall would have the results by Monday. I told the patient she would review the scan when she receives it. Patient v/u.

## 2020-02-21 NOTE — TELEPHONE ENCOUNTER
----- Message from Sandra Gonzalez sent at 2/21/2020 10:13 AM EST -----  Regarding: Test Results Question  Contact: [phone]  I keep getting a message that I have new test results yesterday and today but they are not in MyChart.

## 2020-02-24 ENCOUNTER — TELEPHONE (OUTPATIENT)
Dept: ONCOLOGY | Facility: CLINIC | Age: 75
End: 2020-02-24

## 2020-02-24 NOTE — TELEPHONE ENCOUNTER
I called and informed patient about her high PTH level and that we put in a referral to endocrinology. Patient v/u.

## 2020-02-24 NOTE — TELEPHONE ENCOUNTER
----- Message from TIFFANY Plunkett sent at 2/21/2020  3:44 PM EST -----  Let patient know that her PTH is high and I am putting in a referral to Dr. Angel know.

## 2020-02-25 ENCOUNTER — TELEPHONE (OUTPATIENT)
Dept: ENDOCRINOLOGY | Facility: CLINIC | Age: 75
End: 2020-02-25

## 2020-02-25 ENCOUNTER — TELEPHONE (OUTPATIENT)
Dept: ONCOLOGY | Facility: CLINIC | Age: 75
End: 2020-02-25

## 2020-02-25 NOTE — TELEPHONE ENCOUNTER
PT WAS OFFERED FIRST AVAILABLE np APPOINTMENT ON 6/29 AND SHE REFUSED. SHE STATED SHE WOULD GO SEE SOMEONE ELSE.

## 2020-02-25 NOTE — TELEPHONE ENCOUNTER
Pt received her appointment to see Dr Claudio Endocrinologist about her thyroid. Appointment is 6/29/20. She does not want to wait that long. Could you recommend someone else.  Please give Sandra a call [phone].

## 2020-02-26 ENCOUNTER — HOSPITAL ENCOUNTER (OUTPATIENT)
Dept: CT IMAGING | Facility: HOSPITAL | Age: 75
Discharge: HOME | End: 2020-02-26
Admitting: NURSE PRACTITIONER

## 2020-02-26 ENCOUNTER — HOSPITAL ENCOUNTER (OUTPATIENT)
Dept: CT IMAGING | Facility: HOSPITAL | Age: 75
Discharge: HOME | End: 2020-02-26

## 2020-02-26 DIAGNOSIS — R79.89 HIGH SERUM CALCIUM: ICD-10-CM

## 2020-02-26 DIAGNOSIS — Z85.9 HISTORY OF CANCER: ICD-10-CM

## 2020-02-26 DIAGNOSIS — R79.89 HIGH SERUM FERRITIN: ICD-10-CM

## 2020-02-26 LAB — CREAT BLDA-MCNC: 0.7 MG/DL (ref 0.6–1.3)

## 2020-02-26 PROCEDURE — 71260 CT THORAX DX C+: CPT

## 2020-02-26 PROCEDURE — 74177 CT ABD & PELVIS W/CONTRAST: CPT

## 2020-02-26 PROCEDURE — 82565 ASSAY OF CREATININE: CPT

## 2020-02-26 PROCEDURE — 0 IOPAMIDOL PER 1 ML: Performed by: NURSE PRACTITIONER

## 2020-02-26 RX ADMIN — IOPAMIDOL 100 ML: 755 INJECTION, SOLUTION INTRAVENOUS at 11:14

## 2020-02-26 NOTE — TELEPHONE ENCOUNTER
I gave this message to Sylvia in scheduling to see if she could get the patient in with someone else. I replied back to the patient's message in mychart letting her know what was going on.

## 2020-03-09 ENCOUNTER — OFFICE VISIT (OUTPATIENT)
Dept: ENDOCRINOLOGY | Facility: CLINIC | Age: 75
End: 2020-03-09

## 2020-03-09 ENCOUNTER — APPOINTMENT (OUTPATIENT)
Dept: LAB | Facility: HOSPITAL | Age: 75
End: 2020-03-09

## 2020-03-09 VITALS
OXYGEN SATURATION: 94 % | SYSTOLIC BLOOD PRESSURE: 140 MMHG | BODY MASS INDEX: 28 KG/M2 | HEART RATE: 79 BPM | HEIGHT: 64 IN | WEIGHT: 164 LBS | DIASTOLIC BLOOD PRESSURE: 68 MMHG

## 2020-03-09 DIAGNOSIS — E21.0 PRIMARY HYPERPARATHYROIDISM (HCC): Primary | ICD-10-CM

## 2020-03-09 PROCEDURE — 99203 OFFICE O/P NEW LOW 30 MIN: CPT | Performed by: INTERNAL MEDICINE

## 2020-03-09 NOTE — PATIENT INSTRUCTIONS
Collect 24h urine for calcium & creatinine.  You will have blood drawn when you drop it off.  Schedule DEXA scan @ [facility].  Will call you with the lab results.  Drink plenty of water.  Increase exercise as planned.  Decrease salt intake.

## 2020-03-10 ENCOUNTER — OFFICE VISIT (OUTPATIENT)
Dept: ONCOLOGY | Facility: CLINIC | Age: 75
End: 2020-03-10

## 2020-03-10 ENCOUNTER — OFFICE VISIT (OUTPATIENT)
Dept: LAB | Facility: HOSPITAL | Age: 75
End: 2020-03-10

## 2020-03-10 VITALS
HEIGHT: 64 IN | TEMPERATURE: 98 F | BODY MASS INDEX: 28.25 KG/M2 | DIASTOLIC BLOOD PRESSURE: 74 MMHG | SYSTOLIC BLOOD PRESSURE: 106 MMHG | HEART RATE: 76 BPM | WEIGHT: 165.5 LBS | RESPIRATION RATE: 18 BRPM

## 2020-03-10 DIAGNOSIS — I10 HYPERTENSION, UNSPECIFIED TYPE: Primary | ICD-10-CM

## 2020-03-10 DIAGNOSIS — R91.1 PULMONARY NODULE: Primary | ICD-10-CM

## 2020-03-10 LAB
BASOPHILS # BLD AUTO: 0.02 10*3/MM3 (ref 0–0.2)
BASOPHILS NFR BLD AUTO: 0.2 % (ref 0–1.5)
DEPRECATED RDW RBC AUTO: 44.6 FL (ref 37–54)
EOSINOPHIL # BLD AUTO: 0.15 10*3/MM3 (ref 0–0.4)
EOSINOPHIL NFR BLD AUTO: 1.8 % (ref 0.3–6.2)
ERYTHROCYTE [DISTWIDTH] IN BLOOD BY AUTOMATED COUNT: 13.2 % (ref 12.3–15.4)
HCT VFR BLD AUTO: 39.2 % (ref 34–46.6)
HGB BLD-MCNC: 12.7 G/DL (ref 12–15.9)
LYMPHOCYTES # BLD AUTO: 2.68 10*3/MM3 (ref 0.7–3.1)
LYMPHOCYTES NFR BLD AUTO: 33 % (ref 19.6–45.3)
MCH RBC QN AUTO: 30.8 PG (ref 26.6–33)
MCHC RBC AUTO-ENTMCNC: 32.4 G/DL (ref 31.5–35.7)
MCV RBC AUTO: 95.1 FL (ref 79–97)
MONOCYTES # BLD AUTO: 0.64 10*3/MM3 (ref 0.1–0.9)
MONOCYTES NFR BLD AUTO: 7.9 % (ref 5–12)
NEUTROPHILS # BLD AUTO: 4.64 10*3/MM3 (ref 1.7–7)
NEUTROPHILS NFR BLD AUTO: 57.1 % (ref 42.7–76)
PLATELET # BLD AUTO: 201 10*3/MM3 (ref 140–450)
PMV BLD AUTO: 9.9 FL (ref 6–12)
RBC # BLD AUTO: 4.12 10*6/MM3 (ref 3.77–5.28)
WBC NRBC COR # BLD: 8.13 10*3/MM3 (ref 3.4–10.8)

## 2020-03-10 PROCEDURE — 85025 COMPLETE CBC W/AUTO DIFF WBC: CPT

## 2020-03-10 PROCEDURE — 36415 COLL VENOUS BLD VENIPUNCTURE: CPT

## 2020-03-10 PROCEDURE — 99214 OFFICE O/P EST MOD 30 MIN: CPT | Performed by: INTERNAL MEDICINE

## 2020-03-10 NOTE — PROGRESS NOTES
Hematology/Oncology Outpatient Follow Up    PATIENT NAME:Sandra Gonzalez  :1945  MRN: [id number]  PRIMARY CARE PHYSICIAN: Natalie Garcia DO  REFERRING PHYSICIAN: Natalie Garcia DO    Chief Complaint   Patient presents with   • Follow-up     Hypercalcemia   • Follow-up     Elevated ferritin level   • Follow-up     Pulmonary nodules        HISTORY OF PRESENT ILLNESS:   This is a 74-year-old female who is here today due to elevated calcium level in her blood.  Patient states that she has been on oral calcium supplementation along with vitamin D.  On a routine biochemical testing she was found to have elevated calcium to 11.  An ionized calcium level was noted to be 1.5.  Patient denies any night sweats or fatigue symptoms.  She has been on weight watchers diet and has lost approximately more than 20 pounds intentionally.  She has occasional right hip pain right joint aching pain.  Overall she feels well otherwise.  Also on her routine lab test was found to have an elevated ferritin to 279 on 2020.  She has no history of iron overload issues.  Her serum iron and  serum iron saturation were normal.  There is no family history of liver cancer, cirrhosis of the liver.     We have her labs from   Which showed her white count was 6.2, hemoglobin 13.3 and platelets were 236, her differentials where 57% neutrophils, 34% lymphocytes, there was no monocytosis, eosinophilia or basophilia.  BUN was 17, creatinine 0.93 and serum calcium was 11.  She has normal total protein and albumin levels.  Liver function tests were also unremarkable.  Serum iron was normal at 86, iron saturation was normal at 28.  TSH was normal and vitamin D1 was also normal at 43.1.  Sed rate was 6.     Patient also has a history of bladder cancer, kidney cancer and cervical cancer.  · 2020 patient had a CT scan of the chest, abdomen and pelvis, this revealed nonspecific 4 to 5 mm pulmonary nodules in the lower lobes  follow-up CT of the chest in 3 months has been recommended in the abdomen there where probable hepatic cyst but no evidence of masses in the abdomen.  · 2/17/2020 patient had a parathyroid hormone level which was elevated at 121, sed rate was normal at 10, C-reactive protein was normal at 0.06 IgA was normal at 157, IgG was normal at 935 and IgM normal at 70 her white count was 7.7, hemoglobin 13.9 and platelets were 224 with unremarkable differential.  Patient had HFE analysis which did not show any mutations.  SPEP with ALISA did not show any evidence of monoclonal protein.  She has normal free light chains.  · 3/9/2020-patient was referred to Dr. Burnham for elevated parathyroid hormone.  Further work-up has been recommended including 24-hour urine calcium evaluation, and DEXA scan patient also will be referred to surgeon by Dr. Burnham    Past Medical History:   Diagnosis Date   • Achilles tendinitis    • Allergic rhinitis    • Bladder cancer (CMS/HCC)     2016/ 2019 Bladder Tumor Sx   • Cancer of kidney (CMS/HCC)    • DDD (degenerative disc disease), lumbar    • Hyperlipidemia    • Hypertension    • Lower back pain    • Mitral valve prolapse    • Pulmonary nodule    • Rheumatic fever    • Vitamin D deficiency        Past Surgical History:   Procedure Laterality Date   • ADENOIDECTOMY     • APPENDECTOMY     • BLADDER TUMOR EXCISION  05/2019    x 2   • CHOLECYSTECTOMY     • COLONOSCOPY  07/2018    recheck in 2023 at [facility]   • NEPHRECTOMY  09/2009    left nephrectomy   • TONSILLECTOMY     • TOTAL ABDOMINAL HYSTERECTOMY WITH SALPINGO OOPHORECTOMY           Current Outpatient Medications:   •  amLODIPine (NORVASC) 5 MG tablet, TAKE 1 TABLET BY MOUTH ONE TIME A DAY, Disp: 90 tablet, Rfl: 1  •  B Complex-C (SUPER B COMPLEX PO), Take 1 tablet by mouth Daily., Disp: , Rfl:   •  Biotin 10 MG tablet, Take 1 tablet by mouth Daily., Disp: , Rfl:   •  cetirizine (zyrTEC) 10 MG tablet, Take 10 mg by mouth Daily., Disp: ,  Rfl:   •  hydrOXYzine pamoate (VISTARIL) 25 MG capsule, Take 1 capsule by mouth 3 (Three) Times a Day As Needed for Itching or Allergies., Disp: 90 capsule, Rfl: 3    Allergies   Allergen Reactions   • B.F.I. Rash     [employer] [place]   • Benzocaine Rash   • Amoxicillin-Pot Clavulanate Hives       Family History   Problem Relation Age of Onset   • Cancer Mother         gallbladder cancer   • Brain cancer Father 77   • No Known Problems Sister    • No Known Problems Brother        Cancer-related family history includes Brain cancer (age of onset: 77) in her father; Cancer in her mother.    Social History     Tobacco Use   • Smoking status: Never Smoker   • Smokeless tobacco: Never Used   Substance Use Topics   • Alcohol use: No     Frequency: Never   • Drug use: No       I have reviewed the history of present illness, past medical history, family history, social history, lab results, all notes and other records since the patient was last seen on 2/25/2020.    SUBJECTIVE:    The patient is here for a follow up appointment to discuss results of imaging and additional labs.  The patient states that she did see Dr. Burnham yesterday.  Dr. Burnham has ordered a 24 hour urine and dexascan due to possible hyperparathyroidism.  The patient states that she is scheduled to have a CT scan with Dr. Saldana in 8/2020.            REVIEW OF SYSTEMS:  Review of Systems   Constitutional: Negative for chills and fever.   HENT: Negative for ear pain, mouth sores, nosebleeds and sore throat.    Eyes: Negative for photophobia and visual disturbance.   Respiratory: Negative for wheezing and stridor.    Cardiovascular: Negative for chest pain and palpitations.   Gastrointestinal: Negative for abdominal pain, diarrhea, nausea and vomiting.   Endocrine: Negative for cold intolerance and heat intolerance.   Genitourinary: Negative for dysuria and hematuria.   Musculoskeletal: Negative for joint swelling and neck stiffness.   Skin: Negative  "for color change and rash.   Neurological: Negative for seizures and syncope.   Hematological: Negative for adenopathy.        No obvious bleeding   Psychiatric/Behavioral: Negative for agitation, confusion and hallucinations.       OBJECTIVE:    Vitals:    03/10/20 1527   BP: 106/74   Pulse: 76   Resp: 18   Temp: 98 °F (36.7 °C)   Weight: 75.1 kg (165 lb 8 oz)   Height: 162.6 cm (64\")   PainSc: 0-No pain     Body mass index is 28.41 kg/m².    ECOG  (1) Restricted in physically strenuous activity, ambulatory and able to do work of light nature    Physical Exam   Constitutional: She is oriented to person, place, and time. No distress.   HENT:   Head: Normocephalic and atraumatic.   Eyes: Conjunctivae and EOM are normal. Right eye exhibits no discharge. Left eye exhibits no discharge. No scleral icterus.   Neck: Normal range of motion. Neck supple. No thyromegaly present.   Cardiovascular: Normal rate, regular rhythm and normal heart sounds. Exam reveals no gallop and no friction rub.   Pulmonary/Chest: Effort normal. No stridor. No respiratory distress. She has no wheezes.   Abdominal: Soft. Bowel sounds are normal. She exhibits no mass. There is no tenderness. There is no rebound and no guarding.   Musculoskeletal: Normal range of motion. She exhibits no tenderness.   Lymphadenopathy:     She has no cervical adenopathy.   Neurological: She is alert and oriented to person, place, and time. She exhibits normal muscle tone.   Skin: Skin is warm. No rash noted. She is not diaphoretic. No erythema.   Psychiatric: She has a normal mood and affect. Her behavior is normal.   Nursing note and vitals reviewed.      RECENT LABS  WBC   Date Value Ref Range Status   03/10/2020 8.13 3.40 - 10.80 10*3/mm3 Final     RBC   Date Value Ref Range Status   03/10/2020 4.12 3.77 - 5.28 10*6/mm3 Final     Hemoglobin   Date Value Ref Range Status   03/10/2020 12.7 12.0 - 15.9 g/dL Final     Hematocrit   Date Value Ref Range Status "   03/10/2020 39.2 34.0 - 46.6 % Final     MCV   Date Value Ref Range Status   03/10/2020 95.1 79.0 - 97.0 fL Final     MCH   Date Value Ref Range Status   03/10/2020 30.8 26.6 - 33.0 pg Final     MCHC   Date Value Ref Range Status   03/10/2020 32.4 31.5 - 35.7 g/dL Final     RDW   Date Value Ref Range Status   03/10/2020 13.2 12.3 - 15.4 % Final     RDW-SD   Date Value Ref Range Status   03/10/2020 44.6 37.0 - 54.0 fl Final     MPV   Date Value Ref Range Status   03/10/2020 9.9 6.0 - 12.0 fL Final     Platelets   Date Value Ref Range Status   03/10/2020 201 140 - 450 10*3/mm3 Final     Neutrophil %   Date Value Ref Range Status   03/10/2020 57.1 42.7 - 76.0 % Final     Lymphocyte %   Date Value Ref Range Status   03/10/2020 33.0 19.6 - 45.3 % Final     Monocyte %   Date Value Ref Range Status   03/10/2020 7.9 5.0 - 12.0 % Final     Eosinophil %   Date Value Ref Range Status   03/10/2020 1.8 0.3 - 6.2 % Final     Basophil %   Date Value Ref Range Status   03/10/2020 0.2 0.0 - 1.5 % Final     Neutrophils, Absolute   Date Value Ref Range Status   03/10/2020 4.64 1.70 - 7.00 10*3/mm3 Final     Lymphocytes, Absolute   Date Value Ref Range Status   03/10/2020 2.68 0.70 - 3.10 10*3/mm3 Final     Monocytes, Absolute   Date Value Ref Range Status   03/10/2020 0.64 0.10 - 0.90 10*3/mm3 Final     Eosinophils, Absolute   Date Value Ref Range Status   03/10/2020 0.15 0.00 - 0.40 10*3/mm3 Final     Basophils, Absolute   Date Value Ref Range Status   03/10/2020 0.02 0.00 - 0.20 10*3/mm3 Final       Lab Results   Component Value Date    GLUCOSE 70 09/11/2019    BUN 18 09/11/2019    CREATININE 0.70 02/26/2020    EGFRIFNONA 55 (L) 09/11/2019    BCR 18.0 09/11/2019    K 5.0 09/11/2019    CO2 29.0 09/11/2019    CALCIUM 10.0 09/11/2019    PROTENTOTREF 6.7 02/17/2020    ALBUMIN 3.9 02/17/2020    LABIL2 1.4 02/17/2020    AST 17 09/03/2019    ALT 16 09/03/2019         Assessment/Plan     There are no diagnoses linked to this  encounter.    ASSESSMENT:    1. Hypercalcemia which may be iatrogenic but rule out significant disease including hematologic malignancy, or soft tissue malignancy.  2. Elevated parathyroid hormone level  3. Pulmonary nodules 4 to 5 mm  4. Elevated ferritin level which may be reactive versus iron overload syndrome.  Patient has normal serum iron, and iron saturation.  HFE analysis with no evidence of mutation  5. Personal history of multiple malignancies including kidney cancer, bladder cancer and cervical cancer.    PLANS:     Patient has referred to endocrinology with Dr. Burnham she is currently in the work-up for the hyperparathyroidism.  Would also be referred to a surgeon by Dr. Burnham.  She has pulmonary nodules therefore will like to obtain a follow-up CT of the chest in 3 months which will be due in May 26, 2020.  We will go ahead and order.  He has elevated ferritin level but normal serum iron and iron saturation also had mutational analysis for HFE is negative.  She does not have clinical evidence of iron overload syndrome but I did let her know that we will monitor her iron levels.  This will be done in approximately 4 months from now prior to her next visit.      She has history of 3 primary cancers including kidney, bladder and cervical cancer .  We will discuss cancer genetics to see if patient is interested at the next visit       Explained the above to patient in detail.  We have reviewed potential differential diagnosis.         I have reviewed labs results, imaging, vitals, and medications with the patient today. Will follow up in 4 months with me.    Patient verbalized understanding and is in agreement of the above plan.    Part of this document was scribed by Lorena Ramirez RN, BSN.

## 2020-03-10 NOTE — PROGRESS NOTES
[facility]    Referring Provider: Dr. Hall  Reason for Consultation: Hypercalcemia evaluation & management.    Patient Care Team:  Natalie Garcia DO as PCP - General (Family Medicine)  Reshma Mancini APRN as PCP - Claims Attributed    Chief complaint Abnormal Calcium      Subjective .     History of present illness:    This is a  74 y.o. female with slightly elevated calcium since 2018, was seen by dermatologist for hair loss, pruritus & rash in January.  Initial lab evaluation revealed elevated Calcium ( 11.0 ) & high Fe. Referred to Hematologist. PTH was also high, so referred here.  Denies history of fractures or kidney stones. No family history of elevated calcium or kidney stones either.  C/o fatigue, constipation & polydipsia.  Had a hysterectomy in 1989, @ age 44. Wore estrogen patch for several years. Stopped in 2013. DEXA scan was normal then.  Told to have vit D Deficiency a couple years ago. Took calcium plus vit D supplements until 3 weeks ago.  Walks up & down steps. No fromal exercise program.    Review of Systems  Review of Systems   Constitutional: Positive for fatigue. Negative for unexpected weight loss.   HENT: Negative for trouble swallowing.    Eyes: Negative for blurred vision.   Respiratory: Negative for shortness of breath.    Cardiovascular: Positive for leg swelling.   Gastrointestinal: Positive for constipation. Negative for nausea.   Endocrine: Positive for polydipsia and polyuria.   Genitourinary: Negative for dysuria and vaginal discharge.        Nocturia   Skin: Positive for rash.        Hair loss   Neurological: Negative for dizziness, tremors and headache.       History  Past Medical History:   Diagnosis Date   • Achilles tendinitis    • Allergic rhinitis    • Bladder cancer (CMS/HCC)     2016/ 2019 Bladder Tumor Sx   • Cancer of kidney (CMS/[facility])    • DDD (degenerative disc disease), lumbar    • Hyperlipidemia    • Hypertension    • Lower back pain    •  Mitral valve prolapse    • Pulmonary nodule    • Rheumatic fever    • Vitamin D deficiency      Past Surgical History:   Procedure Laterality Date   • ADENOIDECTOMY     • APPENDECTOMY     • BLADDER TUMOR EXCISION  05/2019    x 2   • CHOLECYSTECTOMY     • COLONOSCOPY  07/2018    recheck in 2023 at [facility]   • NEPHRECTOMY  09/2009    left nephrectomy   • TONSILLECTOMY     • TOTAL ABDOMINAL HYSTERECTOMY WITH SALPINGO OOPHORECTOMY       Family History   Problem Relation Age of Onset   • Cancer Mother         gallbladder cancer   • Brain cancer Father 77   • No Known Problems Sister    • No Known Problems Brother      Social History     Tobacco Use   • Smoking status: Never Smoker   • Smokeless tobacco: Never Used   Substance Use Topics   • Alcohol use: No     Frequency: Never   • Drug use: No       PRN Meds:    Allergies:  B.f.i.; Benzocaine; and Amoxicillin-pot clavulanate    Objective     Vital Signs   Heart Rate:  [79] 79  BP: (140)/(68) 140/68    Physical Exam:     General Appearance:    Alert, cooperative, in no acute distress   Head:    Normocephalic, without obvious abnormality, atraumatic   Eyes:            Lids and lashes normal, conjunctivae and sclerae normal, no   icterus, no pallor, corneas clear, PERRLA   Throat:   No oral lesions,  oral mucosa moist   Neck:   No adenopathy, supple,  no thyromegaly, no   carotid bruit   Lungs:     Clear     Heart:    Regular rhythm and normal rate   Chest Wall:    No abnormalities observed   Abdomen:     Normal bowel sounds, soft                 Extremities:   Moves all extremities well, trace edema               Pulses:   Pulses palpable and equal bilaterally   Skin:   Dry   Neurologic:  DTR 1+, normal vibratory sense       Results Review  I have reviewed the patient's new clinical results, labs & imaging.    DEXA scan 12/2013 @ [facility]: T score spine 0.8: femoral neck -0.1    Lab Results (last 24 hours)     ** No results found for the last 24 hours. **         Lab Results   Component Value Date    HGBA1C 5.2 09/03/2019     .7, iCa 1.56 on 2/17/20    Ca 11.0, Alb 4.6, ALT 15, cr 0.93, BUN 17, K 4.3; TSH 1.54; vit D level 43.1 on 1/9/2020    Ca 10.5 on 5/2018    Assessment/Plan      1. Hyperparathyroidism  2. Hypertension  3. Vitamin D  Deficiency     Collect 24h urine for calcium & creatinine.  You will have blood drawn when you drop it off.  Schedule DEXA scan @ [facility].  Will call you with the lab results.  Drink plenty of water.  Increase exercise as planned.  Decrease salt intake.  Info on hyperparathyroidism & parathyroid scan given to pt.      I discussed the patients findings and my recommendations with patient    Hector Burnham MD  03/10/20  12:06 AM

## 2020-03-11 ENCOUNTER — TELEPHONE (OUTPATIENT)
Dept: ONCOLOGY | Facility: CLINIC | Age: 75
End: 2020-03-11

## 2020-03-11 DIAGNOSIS — R79.89 HIGH SERUM FERRITIN: ICD-10-CM

## 2020-03-11 DIAGNOSIS — R79.89 ELEVATED PTHRP LEVEL: ICD-10-CM

## 2020-03-11 DIAGNOSIS — Z85.9 HISTORY OF CANCER: ICD-10-CM

## 2020-03-11 DIAGNOSIS — R79.89 HIGH SERUM CALCIUM: ICD-10-CM

## 2020-03-11 DIAGNOSIS — R91.1 PULMONARY NODULE: Primary | ICD-10-CM

## 2020-03-11 NOTE — TELEPHONE ENCOUNTER
----- Message from Cinthya Hall MD sent at 3/10/2020  4:57 PM EDT -----  Let us order her CT of the chest without contrast for pulmonary nodules and also iron studies to be done in 4 months a few days prior to her next visit.  Can coordinate with [facility]

## 2020-03-11 NOTE — TELEPHONE ENCOUNTER
Called patient and informed her of the CT chest ordered and told her once approved through insurance someone from scheduling would call to get that set up. I also put lab orders in for her appointment on 6/2/2020 per Dr. Hall. I informed the patient to contact [facility] to make sure they don't want to add any other scans while she is there so she doesn't have to do it twice. Patient v/u.

## 2020-03-12 ENCOUNTER — LAB (OUTPATIENT)
Dept: LAB | Facility: HOSPITAL | Age: 75
End: 2020-03-12

## 2020-03-12 DIAGNOSIS — E21.0 PRIMARY HYPERPARATHYROIDISM (HCC): ICD-10-CM

## 2020-03-12 LAB
ALBUMIN SERPL-MCNC: 4.1 G/DL (ref 3.5–5.2)
ALBUMIN/GLOB SERPL: 2 G/DL
ALP SERPL-CCNC: 59 U/L (ref 39–117)
ALT SERPL W P-5'-P-CCNC: 18 U/L (ref 1–33)
ANION GAP SERPL CALCULATED.3IONS-SCNC: 9.6 MMOL/L (ref 5–15)
AST SERPL-CCNC: 20 U/L (ref 1–32)
BILIRUB SERPL-MCNC: 0.3 MG/DL (ref 0.2–1.2)
BUN BLD-MCNC: 16 MG/DL (ref 8–23)
BUN/CREAT SERPL: 16.2 (ref 7–25)
CALCIUM 24H UR-MCNC: 9.6 MG/DL
CALCIUM 24H UR-MRATE: 148.8 MG/24 HR (ref 100–300)
CALCIUM SPEC-SCNC: 10 MG/DL (ref 8.6–10.5)
CHLORIDE SERPL-SCNC: 105 MMOL/L (ref 98–107)
CO2 SERPL-SCNC: 27.4 MMOL/L (ref 22–29)
COLLECT DURATION TIME UR: 24 HRS
COLLECT DURATION TIME UR: 24 HRS
CREAT BLD-MCNC: 0.99 MG/DL (ref 0.57–1)
CREAT UR-MCNC: 55.2 MG/DL
CREATINE 24H UR-MRATE: 0.86 G/24 HR (ref 0.7–1.6)
GFR SERPL CREATININE-BSD FRML MDRD: 55 ML/MIN/1.73
GLOBULIN UR ELPH-MCNC: 2.1 GM/DL
GLUCOSE BLD-MCNC: 87 MG/DL (ref 65–99)
MAGNESIUM SERPL-MCNC: 2.2 MG/DL (ref 1.6–2.4)
PHOSPHATE SERPL-MCNC: 2.8 MG/DL (ref 2.5–4.5)
POTASSIUM BLD-SCNC: 4.6 MMOL/L (ref 3.5–5.2)
PROT SERPL-MCNC: 6.2 G/DL (ref 6–8.5)
SODIUM BLD-SCNC: 142 MMOL/L (ref 136–145)
SPECIMEN VOL 24H UR: 1550 ML
SPECIMEN VOL 24H UR: 1550 ML

## 2020-03-12 PROCEDURE — 36415 COLL VENOUS BLD VENIPUNCTURE: CPT

## 2020-03-12 PROCEDURE — 84100 ASSAY OF PHOSPHORUS: CPT

## 2020-03-12 PROCEDURE — 81050 URINALYSIS VOLUME MEASURE: CPT

## 2020-03-12 PROCEDURE — 82570 ASSAY OF URINE CREATININE: CPT

## 2020-03-12 PROCEDURE — 80053 COMPREHEN METABOLIC PANEL: CPT

## 2020-03-12 PROCEDURE — 83735 ASSAY OF MAGNESIUM: CPT

## 2020-03-12 PROCEDURE — 82340 ASSAY OF CALCIUM IN URINE: CPT

## 2020-03-18 ENCOUNTER — OFFICE VISIT (OUTPATIENT)
Dept: CARDIOLOGY | Facility: CLINIC | Age: 75
End: 2020-03-18

## 2020-03-18 VITALS
OXYGEN SATURATION: 100 % | BODY MASS INDEX: 27.83 KG/M2 | HEIGHT: 64 IN | WEIGHT: 163 LBS | DIASTOLIC BLOOD PRESSURE: 87 MMHG | SYSTOLIC BLOOD PRESSURE: 128 MMHG | HEART RATE: 80 BPM

## 2020-03-18 DIAGNOSIS — I34.0 NONRHEUMATIC MITRAL VALVE REGURGITATION: ICD-10-CM

## 2020-03-18 DIAGNOSIS — E78.5 DYSLIPIDEMIA: ICD-10-CM

## 2020-03-18 DIAGNOSIS — I10 ESSENTIAL HYPERTENSION: Primary | ICD-10-CM

## 2020-03-18 PROCEDURE — 99213 OFFICE O/P EST LOW 20 MIN: CPT | Performed by: INTERNAL MEDICINE

## 2020-03-18 NOTE — PROGRESS NOTES
"    Subjective:     Encounter Date:03/18/2020      Patient ID: Sandra Gonzalez is a 74 y.o. female.    Chief Complaint:  History of Present Illness 74-year-old white female with history of hypertension hyperlipidemia and mitral regurgitation in the past and other medical problems presents to my office for follow-up.  Patient is currently stable without symptoms of chest pain or shortness of breath at rest on exertion.  No complains any PND orthopnea.  She has occasional palpitations without any dizziness or syncope.  No swelling of the feet.  Patient has been taking her medicines regularly.  She is also quite active.  She does not smoke.    The following portions of the patient's history were reviewed and updated as appropriate: allergies, current medications, past family history, past medical history, past social history, past surgical history and problem list.  Past Medical History:   Diagnosis Date   • Achilles tendinitis    • Allergic rhinitis    • Bladder cancer (CMS/HCC)     2016/ 2019 Bladder Tumor Sx   • Cancer of kidney (CMS/HCC)    • DDD (degenerative disc disease), lumbar    • Hyperlipidemia    • Hypertension    • Lower back pain    • Mitral valve prolapse    • Pulmonary nodule    • Rheumatic fever    • Vitamin D deficiency      Past Surgical History:   Procedure Laterality Date   • ADENOIDECTOMY     • APPENDECTOMY     • BLADDER TUMOR EXCISION  05/2019    x 2   • CHOLECYSTECTOMY     • COLONOSCOPY  07/2018    recheck in 2023 at [facility]   • NEPHRECTOMY  09/2009    left nephrectomy   • TONSILLECTOMY     • TOTAL ABDOMINAL HYSTERECTOMY WITH SALPINGO OOPHORECTOMY       /87   Pulse 80   Ht 162.6 cm (64\")   Wt 73.9 kg (163 lb)   LMP 03/10/1990 (LMP Unknown)   SpO2 100%   BMI 27.98 kg/m²   Family History   Problem Relation Age of Onset   • Cancer Mother         gallbladder cancer   • Brain cancer Father 77   • No Known Problems Sister    • No Known Problems Brother        Current Outpatient Medications:   • "  amLODIPine (NORVASC) 5 MG tablet, TAKE 1 TABLET BY MOUTH ONE TIME A DAY, Disp: 90 tablet, Rfl: 1  •  B Complex-C (SUPER B COMPLEX PO), Take 1 tablet by mouth Daily., Disp: , Rfl:   •  Biotin 10 MG tablet, Take 1 tablet by mouth Daily., Disp: , Rfl:   •  cetirizine (zyrTEC) 10 MG tablet, Take 10 mg by mouth Daily., Disp: , Rfl:   •  hydrOXYzine pamoate (VISTARIL) 25 MG capsule, Take 1 capsule by mouth 3 (Three) Times a Day As Needed for Itching or Allergies., Disp: 90 capsule, Rfl: 3  Allergies   Allergen Reactions   • B.F.I. Rash     [employer] [place]   • Benzocaine Rash   • Amoxicillin-Pot Clavulanate Hives     Social History     Socioeconomic History   • Marital status:      Spouse name: Not on file   • Number of children: Not on file   • Years of education: Not on file   • Highest education level: Not on file   Tobacco Use   • Smoking status: Never Smoker   • Smokeless tobacco: Never Used   Substance and Sexual Activity   • Alcohol use: No     Frequency: Never   • Drug use: No   • Sexual activity: Yes     Partners: Male     Birth control/protection: None     Comment: I am 74 years old not worried about getting pregnant.     Review of Systems   Constitution: Positive for malaise/fatigue. Negative for fever.   Cardiovascular: Positive for palpitations. Negative for chest pain, dyspnea on exertion and leg swelling.   Respiratory: Negative for cough and shortness of breath.    Skin: Negative for rash.   Gastrointestinal: Negative for abdominal pain, nausea and vomiting.   Neurological: Negative for focal weakness, headaches, light-headedness and numbness.   All other systems reviewed and are negative.             Objective:     Physical Exam   Constitutional: She appears well-developed and well-nourished.   HENT:   Head: Normocephalic and atraumatic.   Eyes: Conjunctivae are normal. No scleral icterus.   Neck: Normal range of motion. Neck supple. No JVD present. Carotid bruit is not present.   Cardiovascular:  Normal rate, regular rhythm, S1 normal, S2 normal, normal heart sounds and intact distal pulses. PMI is not displaced.   Pulmonary/Chest: Effort normal and breath sounds normal. She has no wheezes. She has no rales.   Abdominal: Soft. Bowel sounds are normal.   Neurological: She is alert. She has normal strength.   Skin: Skin is warm and dry. No rash noted.     Procedures    Lab Review:       Assessment:          Diagnosis Plan   1. Essential hypertension     2. Nonrheumatic mitral valve regurgitation     3. Dyslipidemia            Plan:       Patient has history of hypertension is currently stable on medical therapy  Patient has mild mitral valve regurgitation is currently stable with normal LV systolic function  Patient lipid levels are followed by the primary care doctor.  Continue current medicines and follow in 1 year

## 2020-03-26 ENCOUNTER — TELEPHONE (OUTPATIENT)
Dept: ENDOCRINOLOGY | Facility: CLINIC | Age: 75
End: 2020-03-26

## 2020-03-26 NOTE — TELEPHONE ENCOUNTER
----- Message from Hector Burnham MD sent at 3/26/2020  3:08 PM EDT -----  Regarding: FW: Test Results Question  Contact: [phone]      ----- Message -----  From: Trini Scott MA  Sent: 3/26/2020   7:12 AM EDT  To: Hector Burnham MD  Subject: FW: Test Results Question                            ----- Message -----  From: Sandra Gonzalez  Sent: 3/25/2020   7:26 PM EDT  To: [facility]  Subject: Test Results Question                            I never heard how my bone density test from March 16 went and what my next step is. I realize you are extremely busy in the Coronavirus pandemic. Sandra Gonzalez.

## 2020-06-02 ENCOUNTER — LAB (OUTPATIENT)
Dept: LAB | Facility: HOSPITAL | Age: 75
End: 2020-06-02

## 2020-06-02 DIAGNOSIS — M54.50 LOW BACK PAIN, UNSPECIFIED BACK PAIN LATERALITY, UNSPECIFIED CHRONICITY, UNSPECIFIED WHETHER SCIATICA PRESENT: Primary | ICD-10-CM

## 2020-06-02 DIAGNOSIS — E21.0 PRIMARY HYPERPARATHYROIDISM (HCC): ICD-10-CM

## 2020-06-02 DIAGNOSIS — C67.9 MALIGNANT NEOPLASM OF URINARY BLADDER, UNSPECIFIED SITE (HCC): ICD-10-CM

## 2020-06-02 DIAGNOSIS — I10 HYPERTENSION, UNSPECIFIED TYPE: ICD-10-CM

## 2020-06-02 LAB
BASOPHILS # BLD AUTO: 0.03 10*3/MM3 (ref 0–0.2)
BASOPHILS NFR BLD AUTO: 0.5 % (ref 0–1.5)
DEPRECATED RDW RBC AUTO: 44.8 FL (ref 37–54)
EOSINOPHIL # BLD AUTO: 0.11 10*3/MM3 (ref 0–0.4)
EOSINOPHIL NFR BLD AUTO: 1.9 % (ref 0.3–6.2)
ERYTHROCYTE [DISTWIDTH] IN BLOOD BY AUTOMATED COUNT: 13.3 % (ref 12.3–15.4)
FERRITIN SERPL-MCNC: 195.4 NG/ML (ref 13–150)
HCT VFR BLD AUTO: 40.5 % (ref 34–46.6)
HGB BLD-MCNC: 13.3 G/DL (ref 12–15.9)
IRON 24H UR-MRATE: 85 MCG/DL (ref 37–145)
IRON SATN MFR SERPL: 25 % (ref 20–50)
LYMPHOCYTES # BLD AUTO: 2 10*3/MM3 (ref 0.7–3.1)
LYMPHOCYTES NFR BLD AUTO: 34.5 % (ref 19.6–45.3)
MCH RBC QN AUTO: 30.8 PG (ref 26.6–33)
MCHC RBC AUTO-ENTMCNC: 32.8 G/DL (ref 31.5–35.7)
MCV RBC AUTO: 93.8 FL (ref 79–97)
MONOCYTES # BLD AUTO: 0.45 10*3/MM3 (ref 0.1–0.9)
MONOCYTES NFR BLD AUTO: 7.8 % (ref 5–12)
NEUTROPHILS # BLD AUTO: 3.21 10*3/MM3 (ref 1.7–7)
NEUTROPHILS NFR BLD AUTO: 55.3 % (ref 42.7–76)
PLATELET # BLD AUTO: 190 10*3/MM3 (ref 140–450)
PMV BLD AUTO: 10.1 FL (ref 6–12)
RBC # BLD AUTO: 4.32 10*6/MM3 (ref 3.77–5.28)
TIBC SERPL-MCNC: 337 MCG/DL (ref 298–536)
TRANSFERRIN SERPL-MCNC: 226 MG/DL (ref 200–360)
WBC NRBC COR # BLD: 5.8 10*3/MM3 (ref 3.4–10.8)

## 2020-06-02 PROCEDURE — 84466 ASSAY OF TRANSFERRIN: CPT | Performed by: INTERNAL MEDICINE

## 2020-06-02 PROCEDURE — 36415 COLL VENOUS BLD VENIPUNCTURE: CPT

## 2020-06-02 PROCEDURE — 83540 ASSAY OF IRON: CPT | Performed by: INTERNAL MEDICINE

## 2020-06-02 PROCEDURE — 82728 ASSAY OF FERRITIN: CPT | Performed by: INTERNAL MEDICINE

## 2020-06-02 PROCEDURE — 85025 COMPLETE CBC W/AUTO DIFF WBC: CPT

## 2020-06-09 ENCOUNTER — OFFICE VISIT (OUTPATIENT)
Dept: ONCOLOGY | Facility: CLINIC | Age: 75
End: 2020-06-09

## 2020-06-09 ENCOUNTER — TELEPHONE (OUTPATIENT)
Dept: ONCOLOGY | Facility: CLINIC | Age: 75
End: 2020-06-09

## 2020-06-09 ENCOUNTER — HOSPITAL ENCOUNTER (OUTPATIENT)
Dept: PET IMAGING | Facility: HOSPITAL | Age: 75
Discharge: HOME | End: 2020-06-09
Admitting: NURSE PRACTITIONER

## 2020-06-09 ENCOUNTER — APPOINTMENT (OUTPATIENT)
Dept: LAB | Facility: HOSPITAL | Age: 75
End: 2020-06-09

## 2020-06-09 VITALS
HEART RATE: 68 BPM | WEIGHT: 164 LBS | SYSTOLIC BLOOD PRESSURE: 149 MMHG | BODY MASS INDEX: 28 KG/M2 | TEMPERATURE: 97.5 F | HEIGHT: 64 IN | RESPIRATION RATE: 16 BRPM | DIASTOLIC BLOOD PRESSURE: 82 MMHG

## 2020-06-09 DIAGNOSIS — E83.52 HYPERCALCEMIA: Primary | ICD-10-CM

## 2020-06-09 DIAGNOSIS — R91.1 PULMONARY NODULE: ICD-10-CM

## 2020-06-09 PROCEDURE — 99214 OFFICE O/P EST MOD 30 MIN: CPT | Performed by: INTERNAL MEDICINE

## 2020-06-09 PROCEDURE — 71250 CT THORAX DX C-: CPT

## 2020-06-09 NOTE — PROGRESS NOTES
Hematology/Oncology Outpatient Follow Up    PATIENT NAME:Sandra Gonzalez  :1945  MRN: [id number]  PRIMARY CARE PHYSICIAN: Natalie Garcia DO  REFERRING PHYSICIAN: Natalie Garcia DO    Chief Complaint   Patient presents with   • Follow-up     Pulmonary nodules   • Follow-up     Elevated ferritin        HISTORY OF PRESENT ILLNESS:   This is a 74-year-old female who is here today due to elevated calcium level in her blood.  Patient states that she has been on oral calcium supplementation along with vitamin D.  On a routine biochemical testing she was found to have elevated calcium to 11.  An ionized calcium level was noted to be 1.5.  Patient denies any night sweats or fatigue symptoms.  She has been on weight watchers diet and has lost approximately more than 20 pounds intentionally.  She has occasional right hip pain right joint aching pain.  Overall she feels well otherwise.  Also on her routine lab test was found to have an elevated ferritin to 279 on 2020.  She has no history of iron overload issues.  Her serum iron and  serum iron saturation were normal.  There is no family history of liver cancer, cirrhosis of the liver.     We have her labs from   Which showed her white count was 6.2, hemoglobin 13.3 and platelets were 236, her differentials where 57% neutrophils, 34% lymphocytes, there was no monocytosis, eosinophilia or basophilia.  BUN was 17, creatinine 0.93 and serum calcium was 11.  She has normal total protein and albumin levels.  Liver function tests were also unremarkable.  Serum iron was normal at 86, iron saturation was normal at 28.  TSH was normal and vitamin D1 was also normal at 43.1.  Sed rate was 6.     Patient also has a history of bladder cancer, kidney cancer and cervical cancer.  · 2020 patient had a CT scan of the chest, abdomen and pelvis, this revealed nonspecific 4 to 5 mm pulmonary nodules in the lower lobes follow-up CT of the chest in 3 months has  been recommended in the abdomen there where probable hepatic cyst but no evidence of masses in the abdomen.  · 2/17/2020 patient had a parathyroid hormone level which was elevated at 121, sed rate was normal at 10, C-reactive protein was normal at 0.06 IgA was normal at 157, IgG was normal at 935 and IgM normal at 70 her white count was 7.7, hemoglobin 13.9 and platelets were 224 with unremarkable differential.  Patient had HFE analysis which did not show any mutations.  SPEP with ALISA did not show any evidence of monoclonal protein.  She has normal free light chains.  · 3/9/2020-patient was referred to Dr. Burnham for elevated parathyroid hormone.  Further work-up has been recommended including 24-hour urine calcium evaluation, and DEXA scan patient also will be referred to surgeon by Dr. Burnham  · 6/2/2020 patient had iron studies which showed a normal serum iron 95, iron saturation was normal at 25%.  TIBC is normal at 337.  Ferritin is 195  · 6/2/2020-patient had a serum calcium level which was normal at 10  · 6/9/2020-patient had CT scan of the chest which showed stable pulmonary nodules.  Follow-up in 6 months has been recommended      Past Medical History:   Diagnosis Date   • Achilles tendinitis    • Allergic rhinitis    • Bladder cancer (CMS/HCC)     2016/ 2019 Bladder Tumor Sx   • Cancer of kidney (CMS/HCC)    • DDD (degenerative disc disease), lumbar    • Hyperlipidemia    • Hypertension    • Lower back pain    • Mitral valve prolapse    • Pulmonary nodule    • Rheumatic fever    • Vitamin D deficiency        Past Surgical History:   Procedure Laterality Date   • ADENOIDECTOMY     • APPENDECTOMY     • BLADDER TUMOR EXCISION  05/2019    x 2   • CHOLECYSTECTOMY     • COLONOSCOPY  07/2018    recheck in 2023 at [facility]   • NEPHRECTOMY  09/2009    left nephrectomy   • TONSILLECTOMY     • TOTAL ABDOMINAL HYSTERECTOMY WITH SALPINGO OOPHORECTOMY           Current Outpatient Medications:   •  amLODIPine  (NORVASC) 5 MG tablet, TAKE 1 TABLET BY MOUTH ONE TIME A DAY, Disp: 90 tablet, Rfl: 1  •  B Complex-C (SUPER B COMPLEX PO), Take 1 tablet by mouth Daily., Disp: , Rfl:   •  Biotin 10 MG tablet, Take 1 tablet by mouth Daily., Disp: , Rfl:   •  cetirizine (zyrTEC) 10 MG tablet, Take 10 mg by mouth Daily., Disp: , Rfl:   •  hydrOXYzine pamoate (VISTARIL) 25 MG capsule, Take 1 capsule by mouth 3 (Three) Times a Day As Needed for Itching or Allergies., Disp: 90 capsule, Rfl: 3    Allergies   Allergen Reactions   • B.F.I. Rash     [employer] [place]   • Benzocaine Rash   • Amoxicillin-Pot Clavulanate Hives       Family History   Problem Relation Age of Onset   • Cancer Mother         gallbladder cancer   • Brain cancer Father 77   • No Known Problems Sister    • No Known Problems Brother        Cancer-related family history includes Brain cancer (age of onset: 77) in her father; Cancer in her mother.    Social History     Tobacco Use   • Smoking status: Never Smoker   • Smokeless tobacco: Never Used   Substance Use Topics   • Alcohol use: No     Frequency: Never   • Drug use: No       I have reviewed and confirmed the accuracy of the patient's history: Chief complaint, HPI and ROS as entered by the MA/LPN/RN. Cinthya Hall MD 06/09/20     SUBJECTIVE:    The patient is here for a follow up appointment .  Patient has no specific complaints today.  She is alone today for this appointment.  She denies any fevers, chills, nausea or vomiting.  She denies abdominal pain or changes to her bowel habits.          REVIEW OF SYSTEMS:  Review of Systems   Constitutional: Negative for chills and fever.   HENT: Negative for ear pain, mouth sores, nosebleeds and sore throat.    Eyes: Negative for photophobia and visual disturbance.   Respiratory: Negative for wheezing and stridor.    Cardiovascular: Negative for chest pain and palpitations.   Gastrointestinal: Negative for abdominal pain, diarrhea, nausea and vomiting.   Endocrine:  "Negative for cold intolerance and heat intolerance.   Genitourinary: Negative for dysuria and hematuria.   Musculoskeletal: Negative for joint swelling and neck stiffness.   Skin: Negative for color change and rash.   Neurological: Negative for seizures and syncope.   Hematological: Negative for adenopathy.        No obvious bleeding   Psychiatric/Behavioral: Negative for agitation, confusion and hallucinations.       OBJECTIVE:    Vitals:    06/09/20 1206   BP: 149/82   Pulse: 68   Resp: 16   Temp: 97.5 °F (36.4 °C)   Weight: 74.4 kg (164 lb)   Height: 162.6 cm (64\")   PainSc: 0-No pain     Body mass index is 28.15 kg/m².    ECOG  (1) Restricted in physically strenuous activity, ambulatory and able to do work of light nature    Physical Exam   Constitutional: She is oriented to person, place, and time. No distress.   HENT:   Head: Normocephalic and atraumatic.   Eyes: Conjunctivae and EOM are normal. Right eye exhibits no discharge. Left eye exhibits no discharge. No scleral icterus.   Neck: Normal range of motion. Neck supple. No thyromegaly present.   Cardiovascular: Normal rate, regular rhythm and normal heart sounds. Exam reveals no gallop and no friction rub.   Pulmonary/Chest: Effort normal. No stridor. No respiratory distress. She has no wheezes.   Abdominal: Soft. Bowel sounds are normal. She exhibits no mass. There is no tenderness. There is no rebound and no guarding.   Musculoskeletal: Normal range of motion. She exhibits no tenderness.   Lymphadenopathy:     She has no cervical adenopathy.   Neurological: She is alert and oriented to person, place, and time. She exhibits normal muscle tone.   Skin: Skin is warm. No rash noted. She is not diaphoretic. No erythema.   Psychiatric: She has a normal mood and affect. Her behavior is normal.   Nursing note and vitals reviewed.      RECENT LABS  WBC   Date Value Ref Range Status   06/02/2020 5.80 3.40 - 10.80 10*3/mm3 Final     RBC   Date Value Ref Range Status "   06/02/2020 4.32 3.77 - 5.28 10*6/mm3 Final     Hemoglobin   Date Value Ref Range Status   06/02/2020 13.3 12.0 - 15.9 g/dL Final     Hematocrit   Date Value Ref Range Status   06/02/2020 40.5 34.0 - 46.6 % Final     MCV   Date Value Ref Range Status   06/02/2020 93.8 79.0 - 97.0 fL Final     MCH   Date Value Ref Range Status   06/02/2020 30.8 26.6 - 33.0 pg Final     MCHC   Date Value Ref Range Status   06/02/2020 32.8 31.5 - 35.7 g/dL Final     RDW   Date Value Ref Range Status   06/02/2020 13.3 12.3 - 15.4 % Final     RDW-SD   Date Value Ref Range Status   06/02/2020 44.8 37.0 - 54.0 fl Final     MPV   Date Value Ref Range Status   06/02/2020 10.1 6.0 - 12.0 fL Final     Platelets   Date Value Ref Range Status   06/02/2020 190 140 - 450 10*3/mm3 Final     Neutrophil %   Date Value Ref Range Status   06/02/2020 55.3 42.7 - 76.0 % Final     Lymphocyte %   Date Value Ref Range Status   06/02/2020 34.5 19.6 - 45.3 % Final     Monocyte %   Date Value Ref Range Status   06/02/2020 7.8 5.0 - 12.0 % Final     Eosinophil %   Date Value Ref Range Status   06/02/2020 1.9 0.3 - 6.2 % Final     Basophil %   Date Value Ref Range Status   06/02/2020 0.5 0.0 - 1.5 % Final     Neutrophils, Absolute   Date Value Ref Range Status   06/02/2020 3.21 1.70 - 7.00 10*3/mm3 Final     Lymphocytes, Absolute   Date Value Ref Range Status   06/02/2020 2.00 0.70 - 3.10 10*3/mm3 Final     Monocytes, Absolute   Date Value Ref Range Status   06/02/2020 0.45 0.10 - 0.90 10*3/mm3 Final     Eosinophils, Absolute   Date Value Ref Range Status   06/02/2020 0.11 0.00 - 0.40 10*3/mm3 Final     Basophils, Absolute   Date Value Ref Range Status   06/02/2020 0.03 0.00 - 0.20 10*3/mm3 Final       Lab Results   Component Value Date    GLUCOSE 87 03/12/2020    BUN 16 03/12/2020    CREATININE 0.99 03/12/2020    EGFRIFNONA 55 (L) 03/12/2020    BCR 16.2 03/12/2020    K 4.6 03/12/2020    CO2 27.4 03/12/2020    CALCIUM 10.0 03/12/2020    PROTENTOTREF 6.7  02/17/2020    ALBUMIN 4.10 03/12/2020    LABIL2 1.4 02/17/2020    AST 20 03/12/2020    ALT 18 03/12/2020         Assessment/Plan     There are no diagnoses linked to this encounter.    ASSESSMENT:    1. Hypercalcemia due to hyperparathyroidism: Followed by endocrinology  2. Elevated parathyroid hormone level: For possible surgery in the future.  3. Pulmonary nodules 4 to 5 mm. Ongoing management stable CT scan follow-up in 6 months recommended  4. Elevated ferritin level which may be reactive versus iron overload syndrome.  Patient has normal serum iron, and iron saturation.  HFE analysis with no evidence of mutation.  Ferritin level decreasing: Continue to monitor  5. Personal history of multiple malignancies including kidney cancer, bladder cancer and cervical cancer.    PLANS:    Pulmonary nodules remain stable: Follow-up CT of the chest in 6 months has been recommended     Follow-up with endocrinology with Dr. Burnham for hyperparathyroidism    She has elevated ferritin level but normal serum iron and iron saturation also had mutational analysis for HFE is negative.  She does not have clinical evidence of iron overload syndrome but I did let her know that we will monitor her iron levels.  Her ferritin level continues to decrease.      Follow-up in 6 months  She has history of 3 primary cancers including kidney, bladder and cervical cancer .  We will discuss cancer genetics to see if patient is interested at the next visit             I have reviewed labs results, imaging, vitals, and medications with the patient today. Will follow up in 6 months with me.    Patient verbalized understanding and is in agreement of the above plan.

## 2020-06-09 NOTE — TELEPHONE ENCOUNTER
PATIENT DOES NOT HAVE A DEXASCAN RESULT AT THIS TIME AT [facility]

## 2020-07-20 ENCOUNTER — TELEPHONE (OUTPATIENT)
Dept: ENDOCRINOLOGY | Facility: CLINIC | Age: 75
End: 2020-07-20

## 2020-07-20 NOTE — TELEPHONE ENCOUNTER
Pt called and LM stating that she has been seen for parathyroid. Since she has been seen she started Weight Watchers and lost 30 lbs. Within the last month she has been eating and doing everything the same but is struggling to keep the weight the same. She is wanting to know if this has something to do with her parathryoid, or if this could be something else? She isnt sure if she needs to continue WW or switch to a different type of diet. Please advise?

## 2020-09-03 RX ORDER — AMLODIPINE BESYLATE 5 MG/1
TABLET ORAL
Qty: 90 TABLET | Refills: 0 | Status: SHIPPED | OUTPATIENT
Start: 2020-09-03 | End: 2020-12-01

## 2020-09-21 ENCOUNTER — TELEPHONE (OUTPATIENT)
Dept: FAMILY MEDICINE CLINIC | Facility: CLINIC | Age: 75
End: 2020-09-21

## 2020-09-21 DIAGNOSIS — E78.2 MIXED HYPERLIPIDEMIA: ICD-10-CM

## 2020-09-21 DIAGNOSIS — I10 ESSENTIAL HYPERTENSION: Primary | ICD-10-CM

## 2020-09-22 ENCOUNTER — CLINICAL SUPPORT (OUTPATIENT)
Dept: FAMILY MEDICINE CLINIC | Facility: CLINIC | Age: 75
End: 2020-09-22

## 2020-09-22 DIAGNOSIS — I10 ESSENTIAL HYPERTENSION: ICD-10-CM

## 2020-09-22 DIAGNOSIS — E78.2 MIXED HYPERLIPIDEMIA: ICD-10-CM

## 2020-09-22 PROCEDURE — 36415 COLL VENOUS BLD VENIPUNCTURE: CPT | Performed by: FAMILY MEDICINE

## 2020-09-22 PROCEDURE — 80053 COMPREHEN METABOLIC PANEL: CPT | Performed by: FAMILY MEDICINE

## 2020-09-22 PROCEDURE — 80061 LIPID PANEL: CPT | Performed by: FAMILY MEDICINE

## 2020-09-23 LAB
ALBUMIN SERPL-MCNC: 4.2 G/DL (ref 3.5–5.2)
ALBUMIN/GLOB SERPL: 1.5 G/DL
ALP SERPL-CCNC: 60 U/L (ref 39–117)
ALT SERPL W P-5'-P-CCNC: 20 U/L (ref 1–33)
ANION GAP SERPL CALCULATED.3IONS-SCNC: 8.5 MMOL/L (ref 5–15)
AST SERPL-CCNC: 16 U/L (ref 1–32)
BILIRUB SERPL-MCNC: 0.5 MG/DL (ref 0–1.2)
BUN SERPL-MCNC: 15 MG/DL (ref 8–23)
BUN/CREAT SERPL: 17.6 (ref 7–25)
CALCIUM SPEC-SCNC: 10.6 MG/DL (ref 8.6–10.5)
CHLORIDE SERPL-SCNC: 104 MMOL/L (ref 98–107)
CHOLEST SERPL-MCNC: 216 MG/DL (ref 0–200)
CO2 SERPL-SCNC: 27.5 MMOL/L (ref 22–29)
CREAT SERPL-MCNC: 0.85 MG/DL (ref 0.57–1)
GFR SERPL CREATININE-BSD FRML MDRD: 66 ML/MIN/1.73
GLOBULIN UR ELPH-MCNC: 2.8 GM/DL
GLUCOSE SERPL-MCNC: 91 MG/DL (ref 65–99)
HDLC SERPL-MCNC: 70 MG/DL (ref 40–60)
LDLC SERPL CALC-MCNC: 132 MG/DL (ref 0–100)
LDLC/HDLC SERPL: 1.88 {RATIO}
POTASSIUM SERPL-SCNC: 4.6 MMOL/L (ref 3.5–5.2)
PROT SERPL-MCNC: 7 G/DL (ref 6–8.5)
SODIUM SERPL-SCNC: 140 MMOL/L (ref 136–145)
TRIGL SERPL-MCNC: 71 MG/DL (ref 0–150)
VLDLC SERPL-MCNC: 14.2 MG/DL (ref 5–40)

## 2020-09-29 ENCOUNTER — OFFICE VISIT (OUTPATIENT)
Dept: FAMILY MEDICINE CLINIC | Facility: CLINIC | Age: 75
End: 2020-09-29

## 2020-09-29 VITALS
DIASTOLIC BLOOD PRESSURE: 79 MMHG | TEMPERATURE: 97.1 F | SYSTOLIC BLOOD PRESSURE: 142 MMHG | HEART RATE: 78 BPM | RESPIRATION RATE: 14 BRPM | BODY MASS INDEX: 27.66 KG/M2 | HEIGHT: 64 IN | OXYGEN SATURATION: 98 % | WEIGHT: 162 LBS

## 2020-09-29 DIAGNOSIS — I10 ESSENTIAL HYPERTENSION: Primary | ICD-10-CM

## 2020-09-29 DIAGNOSIS — C64.2 RENAL CELL CARCINOMA OF LEFT KIDNEY (HCC): ICD-10-CM

## 2020-09-29 DIAGNOSIS — E21.0 PRIMARY HYPERPARATHYROIDISM (HCC): ICD-10-CM

## 2020-09-29 DIAGNOSIS — C67.9 MALIGNANT NEOPLASM OF URINARY BLADDER, UNSPECIFIED SITE (HCC): ICD-10-CM

## 2020-09-29 DIAGNOSIS — E78.2 MIXED HYPERLIPIDEMIA: ICD-10-CM

## 2020-09-29 DIAGNOSIS — Z12.39 BREAST CANCER SCREENING: ICD-10-CM

## 2020-09-29 PROBLEM — J02.9 ACUTE PHARYNGITIS: Status: RESOLVED | Noted: 2018-03-26 | Resolved: 2020-09-29

## 2020-09-29 PROBLEM — J98.4 DISORDER OF LUNG: Status: RESOLVED | Noted: 2019-08-26 | Resolved: 2020-09-29

## 2020-09-29 PROBLEM — J31.0 RHINITIS: Status: RESOLVED | Noted: 2018-03-26 | Resolved: 2020-09-29

## 2020-09-29 PROBLEM — R00.2 PALPITATIONS: Status: RESOLVED | Noted: 2019-03-18 | Resolved: 2020-09-29

## 2020-09-29 PROBLEM — J32.9 SINUS INFECTION: Status: RESOLVED | Noted: 2018-03-26 | Resolved: 2020-09-29

## 2020-09-29 PROCEDURE — 99214 OFFICE O/P EST MOD 30 MIN: CPT | Performed by: FAMILY MEDICINE

## 2020-09-29 NOTE — PROGRESS NOTES
Subjective   Sandra Gonzalez is a 75 y.o. female.     Chief Complaint   Patient presents with   • Hypertension   • Rash     rash on her right elbow,right and left leg that itches.Patient states that she went to a art show that was outside with tall grass.    • Hyperlipidemia         Current Outpatient Medications:   •  amLODIPine (NORVASC) 5 MG tablet, TAKE 1 TABLET BY MOUTH ONE TIME A DAY , Disp: 90 tablet, Rfl: 0  •  B Complex-C (SUPER B COMPLEX PO), Take 1 tablet by mouth Daily., Disp: , Rfl:   •  Biotin 10 MG tablet, Take 1 tablet by mouth Daily., Disp: , Rfl:   •  cetirizine (zyrTEC) 10 MG tablet, Take 10 mg by mouth Daily., Disp: , Rfl:     Past Medical History:   Diagnosis Date   • Achilles tendinitis    • Allergic    • Bladder Cancer     2016/ 2019 Bladder Tumor Sx----------Dr. Saldana    • DDD, lumbar    • DEXA     2019 = OSTEOPENIA   • Hyperlipidemia    • Hypertension    • Lower back pain    • MAMMO     NEG = 2019/ 2020   • Mitral valve prolapse    • Pulmonary nodule    • Renal Cell Cancer    • Rheumatic fever    • Vitamin D deficiency        Past Surgical History:   Procedure Laterality Date   • ADENOIDECTOMY     • APPENDECTOMY     • BLADDER TUMOR EXCISION  05/2019    x 2   • CHOLECYSTECTOMY     • COLONOSCOPY  07/2018    recheck in 2023 at [facility]   • NEPHRECTOMY  09/2009    left nephrectomy   • TONSILLECTOMY     • TOTAL ABDOMINAL HYSTERECTOMY WITH SALPINGO OOPHORECTOMY         Family History   Problem Relation Age of Onset   • Cancer Mother         Gallbladder Cancer   • Cancer Father 77        Brain Cancer   • No Known Problems Sister    • No Known Problems Brother        Social History     Socioeconomic History   • Marital status:      Spouse name: Not on file   • Number of children: Not on file   • Years of education: Not on file   • Highest education level: Not on file   Tobacco Use   • Smoking status: Never Smoker   • Smokeless tobacco: Never Used   Substance and Sexual Activity   • Alcohol use: No      Frequency: Never   • Drug use: No   • Sexual activity: Yes     Partners: Male     Birth control/protection: None     Comment: I am 74 years old not worried about getting pregnant.       74 y/o C female here for annual f/u appt on HTN/ CHOL and now has a ?rash    Pt states Home BP = 120's/ 70's w/ home BP cuff    Pt states she started w/ itchy spots on Sun after being out of town in [place] in the country---pt states she is very sensitive to misquito bites but not sure if that is what is going on now; has a few spots on LE and now one on Rt UE----area swollen and pruitic    Pt states she is seeing ENDO for poss parathyroid issue and has f/u appt tomorrow  Pt also seeing Urology for bladder and renal cancer hx       The following portions of the patient's history were reviewed and updated as appropriate: allergies, current medications, past family history, past medical history, past social history, past surgical history and problem list.    Review of Systems   Constitutional: Negative for activity change, appetite change, fatigue, unexpected weight gain and unexpected weight loss.   HENT: Negative for congestion, ear pain, hearing loss, nosebleeds, postnasal drip, rhinorrhea, sinus pressure, sneezing, sore throat, tinnitus and trouble swallowing.    Eyes: Negative for blurred vision and double vision.   Respiratory: Negative for cough and shortness of breath.    Cardiovascular: Negative for chest pain.   Gastrointestinal: Negative for abdominal pain, constipation, diarrhea, nausea, vomiting, GERD and indigestion.   Genitourinary: Negative for difficulty urinating, dysuria, flank pain, frequency, urgency and urinary incontinence.   Musculoskeletal: Negative for arthralgias and myalgias.   Skin: Positive for color change and rash. Negative for skin lesions.   Neurological: Negative for weakness, memory problem and confusion.   Psychiatric/Behavioral: Negative for agitation, self-injury, suicidal ideas, depressed mood and  stress. The patient is not nervous/anxious.        Vitals:    09/29/20 0933   BP: 142/79   Pulse: 78   Resp: 14   Temp: 97.1 °F (36.2 °C)   SpO2: 98%       Objective   Physical Exam  Vitals signs and nursing note reviewed.   Constitutional:       General: She is not in acute distress.     Appearance: Normal appearance. She is well-developed and normal weight. She is not ill-appearing or toxic-appearing.   HENT:      Head: Normocephalic and atraumatic.   Neck:      Musculoskeletal: Normal range of motion and neck supple.   Cardiovascular:      Rate and Rhythm: Normal rate and regular rhythm.      Heart sounds: Normal heart sounds. No murmur.   Pulmonary:      Effort: Pulmonary effort is normal. No respiratory distress.      Breath sounds: Normal breath sounds.   Skin:     General: Skin is warm and dry.      Findings: Erythema, lesion and rash (scattered hives on Rt UE/ b/l LE) present.   Neurological:      General: No focal deficit present.      Mental Status: She is alert and oriented to person, place, and time.      Cranial Nerves: No cranial nerve deficit.   Psychiatric:         Mood and Affect: Mood normal.         Behavior: Behavior normal.         Thought Content: Thought content normal.         Judgment: Judgment normal.           Assessment/Plan   Sandra was seen today for hypertension, rash and hyperlipidemia.    Diagnoses and all orders for this visit:    Essential hypertension    Mixed hyperlipidemia    Primary hyperparathyroidism (CMS/HCC)    Renal cell carcinoma of left kidney (CMS/HCC)    Malignant neoplasm of urinary bladder, unspecified site (CMS/HCC)    Breast cancer screening  -     Mammo Screening Digital Tomosynthesis Bilateral With CAD; Future

## 2020-09-30 ENCOUNTER — OFFICE VISIT (OUTPATIENT)
Dept: ENDOCRINOLOGY | Facility: CLINIC | Age: 75
End: 2020-09-30

## 2020-09-30 ENCOUNTER — LAB (OUTPATIENT)
Dept: LAB | Facility: HOSPITAL | Age: 75
End: 2020-09-30

## 2020-09-30 VITALS
OXYGEN SATURATION: 95 % | SYSTOLIC BLOOD PRESSURE: 116 MMHG | WEIGHT: 162 LBS | HEIGHT: 63 IN | BODY MASS INDEX: 28.7 KG/M2 | DIASTOLIC BLOOD PRESSURE: 68 MMHG | HEART RATE: 81 BPM | TEMPERATURE: 98 F

## 2020-09-30 DIAGNOSIS — E21.0 PRIMARY HYPERPARATHYROIDISM (HCC): Primary | ICD-10-CM

## 2020-09-30 LAB
CA-I BLD-MCNC: 5.9 MG/DL (ref 4.6–5.4)
CA-I SERPL ISE-MCNC: 1.47 MMOL/L (ref 1.15–1.35)
PTH-INTACT SERPL-MCNC: 85.2 PG/ML (ref 15–65)

## 2020-09-30 PROCEDURE — 83970 ASSAY OF PARATHORMONE: CPT | Performed by: INTERNAL MEDICINE

## 2020-09-30 PROCEDURE — 82397 CHEMILUMINESCENT ASSAY: CPT | Performed by: INTERNAL MEDICINE

## 2020-09-30 PROCEDURE — 99213 OFFICE O/P EST LOW 20 MIN: CPT | Performed by: INTERNAL MEDICINE

## 2020-09-30 PROCEDURE — 82330 ASSAY OF CALCIUM: CPT | Performed by: INTERNAL MEDICINE

## 2020-09-30 PROCEDURE — 36415 COLL VENOUS BLD VENIPUNCTURE: CPT | Performed by: INTERNAL MEDICINE

## 2020-10-01 NOTE — PROGRESS NOTES
[facility]        Patient Care Team:  Natalie Garcia DO as PCP - General (Family Medicine)  Reshma Mancini APRN as PCP - Claims Attributed    Chief Complaint:    Chief Complaint   Patient presents with   • Abnormal Calcium         Subjective     Here for parathyroid f/u  Seen earlier this year with elevated calcium & PTH level.  24h urine calcium was normal, calcium normalized & DEXA scan did not show osteoporosis  Due to the pandemic we decided reevaluated in a few months before proceeding with w/u.  Exercise program: up & down steps  Hair loss improved since on Biotin     Interval History:     Patient Complaints: none  Patient Denies:  Kidney stones or fratures  History taken from: patient    Review of Systems:   Review of Systems   Eyes: Negative for blurred vision.   Gastrointestinal: Negative for nausea.   Endocrine: Negative for polyuria.   Neurological: Negative for headache.     Lost  2 lb since last visit  Objective     Vital Signs      Vitals:    09/30/20 1040   BP: 116/68   Pulse: 81   Temp: 98 °F (36.7 °C)   SpO2: 95%         Physical Exam:     General Appearance:    Alert, cooperative, in no acute distress   Head:    Normocephalic, without obvious abnormality, atraumatic   Eyes:       Mouth:  Neck:       No periorbital edema. No lid lag    No lesions    No goiter, nodules or masses   Lungs:     Clear     Heart:    Regular rhythm and normal rate   Abdomen:     Normal bowel sounds, soft                 Extremities:   Moves all extremities well, trace edema, no tremors               Pulses:   Pulses palpable and equal bilaterally   Skin:   Dry   Neurologic:  DTR 1+          Results Review:    I have reviewed the patient's new clinical results, labs & imaging.    Medication Review:   Prior to Admission medications    Medication Sig Start Date End Date Taking? Authorizing Provider   amLODIPine (NORVASC) 5 MG tablet TAKE 1 TABLET BY MOUTH ONE TIME A DAY  9/3/20  Yes Jose  Natalie, DO   B Complex-C (SUPER B COMPLEX PO) Take 1 tablet by mouth Daily.   Yes Provider, MD June   Biotin 10 MG tablet Take 1 tablet by mouth Daily.   Yes ProviderJune MD   cetirizine (zyrTEC) 10 MG tablet Take 10 mg by mouth Daily.   Yes Provider, MD June       Lab Results (most recent)     Procedure Component Value Units Date/Time    Calcium, Ionized [[id number]]  (Abnormal) Collected: 09/30/20 1136    Specimen: Blood Updated: 09/30/20 2013     Ionized Calcium 1.47 mmol/L      Ionized Calcium 5.9 mg/dL     PTH, Intact [[id number]]  (Abnormal) Collected: 09/30/20 1136    Specimen: Blood Updated: 09/30/20 1949     PTH, Intact 85.2 pg/mL     Narrative:      Results may be falsely decreased if patient taking Biotin.      PTH-related Peptide [[id number]] Collected: 09/30/20 1136    Specimen: Blood Updated: 09/30/20 1136          Lab Results   Component Value Date    HGBA1C 5.2 09/03/2019      Lab Results   Component Value Date    GLUCOSE 91 09/22/2020    BUN 15 09/22/2020    CREATININE 0.85 09/22/2020    EGFRIFNONA 66 09/22/2020    BCR 17.6 09/22/2020    K 4.6 09/22/2020    CO2 27.5 09/22/2020    CALCIUM 10.6 (H) 09/22/2020    PROTENTOTREF 6.7 02/17/2020    ALBUMIN 4.20 09/22/2020    LABIL2 1.4 02/17/2020    AST 16 09/22/2020    ALT 20 09/22/2020    CHOL 216 (H) 09/22/2020     (H) 09/22/2020    HDL 70 (H) 09/22/2020    TRIG 71 09/22/2020     PTH -rp   pending    Assessment/Plan     Sandra was seen today for abnormal calcium.    Diagnoses and all orders for this visit:    Primary hyperparathyroidism (CMS/HCC) - calcium elevated again. Will proceed with work up  -     PTH, Intact  -     Calcium, Ionized  -     PTH-related Peptide  -     NM Parathyroid Scan; Future        Aman parathyroid scan.  Will call you with the results.  Drink plenty of water.        Hector Burnham MD  10/01/20  13:57 EDT

## 2020-10-08 LAB — PTH RELATED PROT SERPL-SCNC: <2 PMOL/L

## 2020-10-15 ENCOUNTER — HOSPITAL ENCOUNTER (OUTPATIENT)
Dept: MAMMOGRAPHY | Facility: HOSPITAL | Age: 75
Discharge: HOME | End: 2020-10-15
Admitting: FAMILY MEDICINE

## 2020-10-15 ENCOUNTER — HOSPITAL ENCOUNTER (OUTPATIENT)
Dept: NUCLEAR MEDICINE | Facility: HOSPITAL | Age: 75
Discharge: HOME | End: 2020-10-15

## 2020-10-15 DIAGNOSIS — E21.0 PRIMARY HYPERPARATHYROIDISM (HCC): ICD-10-CM

## 2020-10-15 DIAGNOSIS — Z12.39 BREAST CANCER SCREENING: ICD-10-CM

## 2020-10-15 PROCEDURE — A9500 TC99M SESTAMIBI: HCPCS | Performed by: INTERNAL MEDICINE

## 2020-10-15 PROCEDURE — 0 TECHNETIUM SESTAMIBI: Performed by: INTERNAL MEDICINE

## 2020-10-15 PROCEDURE — 77067 SCR MAMMO BI INCL CAD: CPT

## 2020-10-15 PROCEDURE — 78070 PARATHYROID PLANAR IMAGING: CPT

## 2020-10-15 PROCEDURE — 77063 BREAST TOMOSYNTHESIS BI: CPT

## 2020-10-15 RX ADMIN — TECHNETIUM TC 99M SESTAMIBI 1 DOSE: 1 INJECTION INTRAVENOUS at 11:00

## 2020-10-17 DIAGNOSIS — E21.0 PRIMARY HYPERPARATHYROIDISM (HCC): Primary | ICD-10-CM

## 2020-10-19 DIAGNOSIS — E21.0 PRIMARY HYPERPARATHYROIDISM (HCC): Primary | ICD-10-CM

## 2020-11-16 ENCOUNTER — TELEPHONE (OUTPATIENT)
Dept: FAMILY MEDICINE CLINIC | Facility: CLINIC | Age: 75
End: 2020-11-16

## 2020-11-16 ENCOUNTER — TELEPHONE (OUTPATIENT)
Dept: ENDOCRINOLOGY | Facility: CLINIC | Age: 75
End: 2020-11-16

## 2020-11-16 DIAGNOSIS — D35.1 PARATHYROID ADENOMA: ICD-10-CM

## 2020-11-16 DIAGNOSIS — E21.0 PRIMARY HYPERPARATHYROIDISM (HCC): Primary | ICD-10-CM

## 2020-11-16 NOTE — TELEPHONE ENCOUNTER
Donna from Dr. Burnham's office left a VM stating that they have seen patient and have referred her to Cintia Cohen for surgery.  Patient's insurance requires a consent to treat for hyperparathyroidism from PCP or they will not cover.  Patient' has appt with Cintia Cohen on 11/18/2020 and has to have the consent to treat prior to the appointment or they will have to cancel.    Fax # for Cintia Cohen's office is [phone]

## 2020-11-16 NOTE — TELEPHONE ENCOUNTER
I dont know what they need from me    I put in the referral again from me if that helps----ok for Dr. Cohen to tx

## 2020-12-01 RX ORDER — AMLODIPINE BESYLATE 5 MG/1
TABLET ORAL
Qty: 90 TABLET | Refills: 0 | Status: SHIPPED | OUTPATIENT
Start: 2020-12-01 | End: 2021-02-25

## 2020-12-18 ENCOUNTER — TELEPHONE (OUTPATIENT)
Dept: FAMILY MEDICINE CLINIC | Facility: CLINIC | Age: 75
End: 2020-12-18

## 2020-12-18 RX ORDER — HYDROXYZINE HYDROCHLORIDE 25 MG/1
25 TABLET, FILM COATED ORAL DAILY PRN
Qty: 40 TABLET | Refills: 0 | Status: SHIPPED | OUTPATIENT
Start: 2020-12-18

## 2020-12-18 RX ORDER — HYDROXYZINE HYDROCHLORIDE 10 MG/1
10 TABLET, FILM COATED ORAL EVERY 8 HOURS PRN
Qty: 40 TABLET | Refills: 0 | Status: SHIPPED | OUTPATIENT
Start: 2020-12-18 | End: 2020-12-18 | Stop reason: SDUPTHER

## 2020-12-18 NOTE — TELEPHONE ENCOUNTER
Patient is requesting a RX of Hydroxyzine 25mg be sent to [facility] in [place].  She stated she uses it as needed for itching.

## 2021-01-11 NOTE — PROGRESS NOTES
Hematology/Oncology Outpatient Follow Up    PATIENT NAME:Sandra Gonzalez  :1945  MRN: [id number]  PRIMARY CARE PHYSICIAN: Natalie Garcia DO  REFERRING PHYSICIAN: Natalie Garcia DO    Chief Complaint   Patient presents with   • Follow-up     Hypercalcemia        HISTORY OF PRESENT ILLNESS:     This is a 74-year-old female who is here today due to elevated calcium level in her blood.  Patient states that she has been on oral calcium supplementation along with vitamin D.  On a routine biochemical testing she was found to have elevated calcium to 11.  An ionized calcium level was noted to be 1.5.  Patient denies any night sweats or fatigue symptoms.  She has been on weight watchers diet and has lost approximately more than 20 pounds intentionally.  She has occasional right hip pain right joint aching pain.  Overall she feels well otherwise.  Also on her routine lab test was found to have an elevated ferritin to 279 on 2020.  She has no history of iron overload issues.  Her serum iron and  serum iron saturation were normal.  There is no family history of liver cancer, cirrhosis of the liver.     We have her labs from   Which showed her white count was 6.2, hemoglobin 13.3 and platelets were 236, her differentials where 57% neutrophils, 34% lymphocytes, there was no monocytosis, eosinophilia or basophilia.  BUN was 17, creatinine 0.93 and serum calcium was 11.  She has normal total protein and albumin levels.  Liver function tests were also unremarkable.  Serum iron was normal at 86, iron saturation was normal at 28.  TSH was normal and vitamin D1 was also normal at 43.1.  Sed rate was 6.     Patient also has a history of bladder cancer, kidney cancer and cervical cancer.  · 2020 patient had a CT scan of the chest, abdomen and pelvis, this revealed nonspecific 4 to 5 mm pulmonary nodules in the lower lobes follow-up CT of the chest in 3 months has been recommended in the abdomen there  where probable hepatic cyst but no evidence of masses in the abdomen.  · 2/17/2020 patient had a parathyroid hormone level which was elevated at 121, sed rate was normal at 10, C-reactive protein was normal at 0.06 IgA was normal at 157, IgG was normal at 935 and IgM normal at 70 her white count was 7.7, hemoglobin 13.9 and platelets were 224 with unremarkable differential.  Patient had HFE analysis which did not show any mutations.  SPEP with ALISA did not show any evidence of monoclonal protein.  She has normal free light chains.  · 3/9/2020-patient was referred to Dr. Burnham for elevated parathyroid hormone.  Further work-up has been recommended including 24-hour urine calcium evaluation, and DEXA scan patient also will be referred to surgeon by Dr. Burnham  · 6/2/2020 patient had iron studies which showed a normal serum iron 95, iron saturation was normal at 25%.  TIBC is normal at 337.  Ferritin is 195  · 6/2/2020-patient had a serum calcium level which was normal at 10  · 6/9/2020-patient had CT scan of the chest which showed stable pulmonary nodules.  Follow-up in 6 months has been recommended      Past Medical History:   Diagnosis Date   • Achilles tendinitis    • Allergic    • Bladder Cancer     2016/ 2019 Bladder Tumor Sx----------Dr. Saldana    • DDD, lumbar    • DEXA     2019 = OSTEOPENIA   • Hyperlipidemia    • Hypertension    • Lower back pain    • MAMMO     NEG = 2019/ 2020   • Mitral valve prolapse    • Pulmonary nodule    • Renal Cell Cancer    • Rheumatic fever    • Vitamin D deficiency        Past Surgical History:   Procedure Laterality Date   • ADENOIDECTOMY     • APPENDECTOMY     • BLADDER TUMOR EXCISION  05/2019    x 2   • CHOLECYSTECTOMY     • COLONOSCOPY  07/2018    [facility]check in 2023 at [facility]   • NEPHRECTOMY  09/2009    left nephrectomy   • TONSILLECTOMY     • TOTAL ABDOMINAL HYSTERECTOMY WITH SALPINGO OOPHORECTOMY           Current Outpatient Medications:   •  amLODIPine (NORVASC) 5 MG  tablet, TAKE 1 TABLET BY MOUTH ONE TIME A DAY , Disp: 90 tablet, Rfl: 0  •  B Complex-C (SUPER B COMPLEX PO), Take 1 tablet by mouth Daily., Disp: , Rfl:   •  Biotin 10 MG tablet, Take 1 tablet by mouth Daily., Disp: , Rfl:   •  cetirizine (zyrTEC) 10 MG tablet, Take 10 mg by mouth Daily., Disp: , Rfl:   •  hydrOXYzine (ATARAX) 25 MG tablet, Take 1 tablet by mouth Daily As Needed for Itching., Disp: 40 tablet, Rfl: 0    Allergies   Allergen Reactions   • B.F.I. Rash     [facility]   • Benzocaine Rash   • Amoxicillin-Pot Clavulanate Hives   • Oxycodone Nausea And Vomiting       Family History   Problem Relation Age of Onset   • Cancer Mother         Gallbladder Cancer   • Cancer Father 77        Brain Cancer   • No Known Problems Sister    • No Known Problems Brother        Cancer-related family history includes Cancer in her mother; Cancer (age of onset: 77) in her father.    Social History     Tobacco Use   • Smoking status: Never Smoker   • Smokeless tobacco: Never Used   Substance Use Topics   • Alcohol use: No     Frequency: Never   • Drug use: No       I have reviewed and confirmed the accuracy of the patient's history: Chief complaint, HPI and ROS as entered by the MA/LPN/RN. Cinthya Hall MD 01/12/21     SUBJECTIVE:    The patient is here for a follow up appointment .  She has developed urticarial rash involving her back and arms.  Patient has been seen by dermatologist and receiving steroidal injection.          REVIEW OF SYSTEMS:  Review of Systems   Constitutional: Negative for chills and fever.   HENT: Negative for ear pain, mouth sores, nosebleeds and sore throat.    Eyes: Negative for photophobia and visual disturbance.   Respiratory: Negative for wheezing and stridor.    Cardiovascular: Negative for chest pain and palpitations.   Gastrointestinal: Negative for abdominal pain, diarrhea, nausea and vomiting.   Endocrine: Negative for cold intolerance and heat intolerance.   Genitourinary:  "Negative for dysuria and hematuria.   Musculoskeletal: Negative for joint swelling and neck stiffness.   Skin: Negative for color change and rash.   Neurological: Negative for seizures and syncope.   Hematological: Negative for adenopathy.        No obvious bleeding   Psychiatric/Behavioral: Negative for agitation, confusion and hallucinations.     Rash on the  back  OBJECTIVE:    Vitals:    01/12/21 1107   BP: 165/75   Pulse: 69   Resp: 18   Temp: 96.8 °F (36 °C)   Weight: 75.8 kg (167 lb 3.2 oz)   Height: 162.6 cm (64\")   PainSc: 0-No pain     Body mass index is 28.7 kg/m².    ECOG  (1) Restricted in physically strenuous activity, ambulatory and able to do work of light nature    Physical Exam   Constitutional: She is oriented to person, place, and time. No distress.   HENT:   Head: Normocephalic and atraumatic.   Eyes: Conjunctivae are normal. Right eye exhibits no discharge. Left eye exhibits no discharge. No scleral icterus.   Neck: Normal range of motion. Neck supple. No thyromegaly present.   Cardiovascular: Normal rate, regular rhythm and normal heart sounds. Exam reveals no gallop and no friction rub.   Pulmonary/Chest: Effort normal. No stridor. No respiratory distress. She has no wheezes.   Abdominal: Soft. Bowel sounds are normal. She exhibits no mass. There is no abdominal tenderness. There is no rebound and no guarding.   Musculoskeletal: Normal range of motion. No tenderness.   Lymphadenopathy:     She has no cervical adenopathy.   Neurological: She is alert and oriented to person, place, and time. She exhibits normal muscle tone.   Skin: Skin is warm. Rash noted. She is not diaphoretic. No erythema.   Psychiatric: Her behavior is normal.   Nursing note and vitals reviewed.      RECENT LABS  WBC   Date Value Ref Range Status   01/12/2021 6.71 3.40 - 10.80 10*3/mm3 Final   12/03/2020 6.40 4.5 - 11.0 10*3/uL Final     RBC   Date Value Ref Range Status   01/12/2021 4.23 3.77 - 5.28 10*6/mm3 Final "   12/03/2020 4.37 4.0 - 5.2 10*6/uL Final     Hemoglobin   Date Value Ref Range Status   01/12/2021 13.0 12.0 - 15.9 g/dL Final   12/03/2020 13.5 12.0 - 16.0 g/dL Final     Hematocrit   Date Value Ref Range Status   01/12/2021 40.8 34.0 - 46.6 % Final   12/03/2020 41.6 36.0 - 46.0 % Final     MCV   Date Value Ref Range Status   01/12/2021 96.5 79.0 - 97.0 fL Final   12/03/2020 95.2 80.0 - 100.0 fL Final     MCH   Date Value Ref Range Status   01/12/2021 30.7 26.6 - 33.0 pg Final   12/03/2020 30.9 26.0 - 34.0 pg Final     MCHC   Date Value Ref Range Status   01/12/2021 31.9 31.5 - 35.7 g/dL Final   12/03/2020 32.5 31.0 - 37.0 g/dL Final     RDW   Date Value Ref Range Status   01/12/2021 12.8 12.3 - 15.4 % Final   12/03/2020 13.0 12.0 - 16.8 % Final     RDW-SD   Date Value Ref Range Status   01/12/2021 43.6 37.0 - 54.0 fl Final     MPV   Date Value Ref Range Status   01/12/2021 9.3 6.0 - 12.0 fL Final   12/03/2020 10.9 8.4 - 12.4 fL Final     Platelets   Date Value Ref Range Status   01/12/2021 216 140 - 450 10*3/mm3 Final   12/03/2020 208 140 - 440 10*3/uL Final     Neutrophil Rel %   Date Value Ref Range Status   12/03/2020 58.4 45 - 80 % Final     Neutrophil %   Date Value Ref Range Status   01/12/2021 67.7 42.7 - 76.0 % Final     Lymphocyte Rel %   Date Value Ref Range Status   12/03/2020 31.7 15 - 50 % Final     Lymphocyte %   Date Value Ref Range Status   01/12/2021 22.8 19.6 - 45.3 % Final     Monocyte Rel %   Date Value Ref Range Status   12/03/2020 6.9 0 - 15 % Final     Monocyte %   Date Value Ref Range Status   01/12/2021 7.2 5.0 - 12.0 % Final     Eosinophil %   Date Value Ref Range Status   01/12/2021 1.9 0.3 - 6.2 % Final   12/03/2020 2.2 0 - 7 % Final     Basophil Rel %   Date Value Ref Range Status   12/03/2020 0.5 0 - 2 % Final     Basophil %   Date Value Ref Range Status   01/12/2021 0.4 0.0 - 1.5 % Final     Immature Grans %   Date Value Ref Range Status   12/03/2020 0.3 0.0 - 1.0 % Final      Neutrophils Absolute   Date Value Ref Range Status   12/03/2020 3.74 2.0 - 8.8 10*3/uL Final     Neutrophils, Absolute   Date Value Ref Range Status   01/12/2021 4.54 1.70 - 7.00 10*3/mm3 Final     Lymphocytes Absolute   Date Value Ref Range Status   12/03/2020 2.03 0.7 - 5.5 10*3/uL Final     Lymphocytes, Absolute   Date Value Ref Range Status   01/12/2021 1.53 0.70 - 3.10 10*3/mm3 Final     Monocytes Absolute   Date Value Ref Range Status   12/03/2020 0.44 0.0 - 1.7 10*3/uL Final     Monocytes, Absolute   Date Value Ref Range Status   01/12/2021 0.48 0.10 - 0.90 10*3/mm3 Final     Eosinophils Absolute   Date Value Ref Range Status   12/03/2020 0.14 0.0 - 0.8 10*3/uL Final     Eosinophils, Absolute   Date Value Ref Range Status   01/12/2021 0.13 0.00 - 0.40 10*3/mm3 Final     Basophils Absolute   Date Value Ref Range Status   12/03/2020 0.03 0.0 - 0.2 10*3/uL Final     Basophils, Absolute   Date Value Ref Range Status   01/12/2021 0.03 0.00 - 0.20 10*3/mm3 Final     Immature Grans, Absolute   Date Value Ref Range Status   12/03/2020 0.02 0.00 - 0.10 10*3/uL Final     nRBC   Date Value Ref Range Status   12/03/2020 0 0 /100(WBC) Final       Lab Results   Component Value Date    GLUCOSE 91 09/22/2020    BUN 15 09/22/2020    CREATININE 0.85 09/22/2020    EGFRIFNONA 66 09/22/2020    BCR 17.6 09/22/2020    K 4.6 09/22/2020    CO2 27.5 09/22/2020    CALCIUM 10.6 (H) 09/22/2020    PROTENTOTREF 6.7 02/17/2020    ALBUMIN 4.20 09/22/2020    LABIL2 1.4 02/17/2020    AST 16 09/22/2020    ALT 20 09/22/2020         Assessment/Plan     History of cancer  - CBC & Differential    Anemia, unspecified type  - Iron Profile  - Ferritin  - Iron Profile  - Ferritin    Lung nodule  - CT Chest Without Contrast      ASSESSMENT:    1. Hypercalcemia due to hyperparathyroidism: Followed by endocrinology  2. Elevated parathyroid hormone level: Status post para thyroidectomy fall 2020  3. Pulmonary nodules 4 to 5 mm. Ongoing management  stable CT scan follow-up in 6 months recommended: Reviewed: Patient is now due for a follow-up CT of the chest  4. Elevated ferritin level which may be reactive versus iron overload syndrome.  Patient has normal serum iron, and iron saturation.  HFE analysis with no evidence of mutation.  Ferritin level decreasing: Check iron studies today  5. Personal history of multiple malignancies including kidney cancer, bladder cancer and cervical cancer.  Formation given on cancer genetics today  6. Urticaria: Followed by dermatology and receiving steroidal injection    PLANS:    Pulmonary nodules remain stable: Follow-up CT of the chest in 6 months has been recommended: This will be ordered at this time as she is due     Follow-up with endocrinology with Dr. Burnham for hyperparathyroidism    She has elevated ferritin level but normal serum iron and iron saturation also had mutational analysis for HFE is negative.  She does not have clinical evidence of iron overload syndrome but I did let her know that we will monitor her iron levels.  Her ferritin level continues to decrease.  Check iron studies today    Follow-up in 6 months    She has history of 3 primary cancers including kidney, bladder and cervical cancer .  Information given on cancer genetics.  Patient to schedule for high risk evaluation            I have reviewed labs results, imaging, vitals, and medications with the patient today. Will follow up in 6 months with me.    Patient verbalized understanding and is in agreement of the above plan.

## 2021-01-12 ENCOUNTER — TELEPHONE (OUTPATIENT)
Dept: ONCOLOGY | Facility: CLINIC | Age: 76
End: 2021-01-12

## 2021-01-12 ENCOUNTER — OFFICE VISIT (OUTPATIENT)
Dept: ONCOLOGY | Facility: CLINIC | Age: 76
End: 2021-01-12

## 2021-01-12 ENCOUNTER — LAB (OUTPATIENT)
Dept: LAB | Facility: HOSPITAL | Age: 76
End: 2021-01-12

## 2021-01-12 VITALS
TEMPERATURE: 96.8 F | BODY MASS INDEX: 28.54 KG/M2 | DIASTOLIC BLOOD PRESSURE: 75 MMHG | RESPIRATION RATE: 18 BRPM | HEART RATE: 69 BPM | WEIGHT: 167.2 LBS | HEIGHT: 64 IN | SYSTOLIC BLOOD PRESSURE: 165 MMHG

## 2021-01-12 DIAGNOSIS — Z85.9 HISTORY OF CANCER: ICD-10-CM

## 2021-01-12 DIAGNOSIS — R91.1 LUNG NODULE: ICD-10-CM

## 2021-01-12 DIAGNOSIS — Z85.9 HISTORY OF CANCER: Primary | ICD-10-CM

## 2021-01-12 DIAGNOSIS — D64.9 ANEMIA, UNSPECIFIED TYPE: ICD-10-CM

## 2021-01-12 LAB
BASOPHILS # BLD AUTO: 0.03 10*3/MM3 (ref 0–0.2)
BASOPHILS NFR BLD AUTO: 0.4 % (ref 0–1.5)
DEPRECATED RDW RBC AUTO: 43.6 FL (ref 37–54)
EOSINOPHIL # BLD AUTO: 0.13 10*3/MM3 (ref 0–0.4)
EOSINOPHIL NFR BLD AUTO: 1.9 % (ref 0.3–6.2)
ERYTHROCYTE [DISTWIDTH] IN BLOOD BY AUTOMATED COUNT: 12.8 % (ref 12.3–15.4)
FERRITIN SERPL-MCNC: 220.2 NG/ML (ref 13–150)
HCT VFR BLD AUTO: 40.8 % (ref 34–46.6)
HGB BLD-MCNC: 13 G/DL (ref 12–15.9)
IRON 24H UR-MRATE: 87 MCG/DL (ref 37–145)
IRON SATN MFR SERPL: 23 % (ref 20–50)
LYMPHOCYTES # BLD AUTO: 1.53 10*3/MM3 (ref 0.7–3.1)
LYMPHOCYTES NFR BLD AUTO: 22.8 % (ref 19.6–45.3)
MCH RBC QN AUTO: 30.7 PG (ref 26.6–33)
MCHC RBC AUTO-ENTMCNC: 31.9 G/DL (ref 31.5–35.7)
MCV RBC AUTO: 96.5 FL (ref 79–97)
MONOCYTES # BLD AUTO: 0.48 10*3/MM3 (ref 0.1–0.9)
MONOCYTES NFR BLD AUTO: 7.2 % (ref 5–12)
NEUTROPHILS NFR BLD AUTO: 4.54 10*3/MM3 (ref 1.7–7)
NEUTROPHILS NFR BLD AUTO: 67.7 % (ref 42.7–76)
PLATELET # BLD AUTO: 216 10*3/MM3 (ref 140–450)
PMV BLD AUTO: 9.3 FL (ref 6–12)
RBC # BLD AUTO: 4.23 10*6/MM3 (ref 3.77–5.28)
TIBC SERPL-MCNC: 371 MCG/DL (ref 298–536)
TRANSFERRIN SERPL-MCNC: 249 MG/DL (ref 200–360)
WBC # BLD AUTO: 6.71 10*3/MM3 (ref 3.4–10.8)

## 2021-01-12 PROCEDURE — 82728 ASSAY OF FERRITIN: CPT | Performed by: INTERNAL MEDICINE

## 2021-01-12 PROCEDURE — 84466 ASSAY OF TRANSFERRIN: CPT | Performed by: INTERNAL MEDICINE

## 2021-01-12 PROCEDURE — 83540 ASSAY OF IRON: CPT | Performed by: INTERNAL MEDICINE

## 2021-01-12 PROCEDURE — 36415 COLL VENOUS BLD VENIPUNCTURE: CPT

## 2021-01-12 PROCEDURE — 99214 OFFICE O/P EST MOD 30 MIN: CPT | Performed by: INTERNAL MEDICINE

## 2021-01-12 PROCEDURE — 85025 COMPLETE CBC W/AUTO DIFF WBC: CPT

## 2021-01-15 ENCOUNTER — TELEPHONE (OUTPATIENT)
Dept: ONCOLOGY | Facility: CLINIC | Age: 76
End: 2021-01-15

## 2021-01-15 NOTE — TELEPHONE ENCOUNTER
----- Message from Sandra Gonzalez sent at 1/14/2021  9:09 PM EST -----  Regarding: Test Results Question  Contact: [phone]  I have a question about FERRITIN resulted on 1/12/21, 3:00 PM.    I see this level is still high. What can be causing this and what can I do to help lower it?  Thanks Sandra Gonzalez  
Called pt to discuss ferritin level. I told her that, according to Dr. Hall's note, since it does not appear that the elevated ferritin level is causing issues and it has decreased from the initial level, we are just going to monitor her levels for now. I also asked the pt if she is taking any supplements with iron or eating red meat or other foods high in iron. Pt denied these things and verbalized understanding that the level will be monitored.   
DISCHARGE

## 2021-01-25 ENCOUNTER — HOSPITAL ENCOUNTER (OUTPATIENT)
Dept: PET IMAGING | Facility: HOSPITAL | Age: 76
Discharge: HOME | End: 2021-01-25
Admitting: INTERNAL MEDICINE

## 2021-01-25 DIAGNOSIS — R91.1 LUNG NODULE: ICD-10-CM

## 2021-01-25 PROCEDURE — 71250 CT THORAX DX C-: CPT

## 2021-01-26 ENCOUNTER — TELEPHONE (OUTPATIENT)
Dept: ONCOLOGY | Facility: CLINIC | Age: 76
End: 2021-01-26

## 2021-01-26 NOTE — TELEPHONE ENCOUNTER
----- Message from Cinthya Hall MD sent at 1/26/2021  9:57 AM EST -----  Patient know that her CT of the chest shows stable pulmonary nodules.  No evidence of progression.  She will need a follow-up CT scan in 6 months.  We will can go ahead and get her scheduled for 1 a few days prior to her next appointment with me in July 2021.  Obtain a noncontrast CT of the chest to follow-up on pulmonary nodules

## 2021-01-26 NOTE — TELEPHONE ENCOUNTER
Called pt to let her know the results of her CT and that she will get a follow-up CT in 6 months. She asked about her ferritin levels which we talked about in a previous conversation. I told her that we still do not know why her ferritin levels are high, but Dr. Hall just wants to monitor for now. Pt verbalized understanding.

## 2021-02-25 RX ORDER — AMLODIPINE BESYLATE 5 MG/1
TABLET ORAL
Qty: 90 TABLET | Refills: 1 | Status: SHIPPED | OUTPATIENT
Start: 2021-02-25 | End: 2021-08-23

## 2021-03-24 ENCOUNTER — OFFICE VISIT (OUTPATIENT)
Dept: CARDIOLOGY | Facility: CLINIC | Age: 76
End: 2021-03-24

## 2021-03-24 VITALS
HEART RATE: 78 BPM | HEIGHT: 64 IN | BODY MASS INDEX: 28.85 KG/M2 | DIASTOLIC BLOOD PRESSURE: 77 MMHG | TEMPERATURE: 97.7 F | WEIGHT: 169 LBS | OXYGEN SATURATION: 98 % | SYSTOLIC BLOOD PRESSURE: 127 MMHG

## 2021-03-24 DIAGNOSIS — I34.0 NONRHEUMATIC MITRAL VALVE REGURGITATION: ICD-10-CM

## 2021-03-24 DIAGNOSIS — E78.5 DYSLIPIDEMIA: ICD-10-CM

## 2021-03-24 DIAGNOSIS — I10 ESSENTIAL HYPERTENSION: Primary | ICD-10-CM

## 2021-03-24 PROCEDURE — 99214 OFFICE O/P EST MOD 30 MIN: CPT | Performed by: INTERNAL MEDICINE

## 2021-03-24 NOTE — PROGRESS NOTES
"    Subjective:     Encounter Date:03/24/2021      Patient ID: Sandra Gonzalez is a 75 y.o. female.    Chief Complaint:  History of Present Illness 85-year-old white female with history of mitral valve disease hypertension hyperlipidemia presents to my office for follow-up.  Patient is currently stable without any symptoms of chest pain or shortness of breath at rest on exertion.  No complains of any PND orthopnea.  No palpitation dizziness syncope or swelling of the feet.  Patient has been taking all the medicines regularly.  Patient does not smoke.  Patient is trying to exercise regularly patient follows a good diet.    The following portions of the patient's history were reviewed and updated as appropriate: allergies, current medications, past family history, past medical history, past social history, past surgical history and problem list.  Past Medical History:   Diagnosis Date   • Achilles tendinitis    • Allergic    • Bladder Cancer     2016/ 2019 Bladder Tumor Sx----------Dr. Saldana    • DDD, lumbar    • DEXA     2019 = OSTEOPENIA   • Hyperlipidemia    • Hypertension    • Lower back pain    • MAMMO     NEG = 2019/ 2020   • Mitral valve prolapse    • Pulmonary nodule    • Renal Cell Cancer    • Rheumatic fever    • Vitamin D deficiency      Past Surgical History:   Procedure Laterality Date   • ADENOIDECTOMY     • APPENDECTOMY     • BLADDER TUMOR EXCISION  05/2019    x 2   • CHOLECYSTECTOMY     • COLONOSCOPY  07/2018    recheck in 2023 at [facility]   • NEPHRECTOMY  09/2009    left nephrectomy   • TONSILLECTOMY     • TOTAL ABDOMINAL HYSTERECTOMY WITH SALPINGO OOPHORECTOMY       /77 (BP Location: Left arm, Patient Position: Sitting)   Pulse 78   Temp 97.7 °F (36.5 °C)   Ht 162.6 cm (64\")   Wt 76.7 kg (169 lb)   LMP 03/10/1990 (LMP Unknown)   SpO2 98%   BMI 29.01 kg/m²   Family History   Problem Relation Age of Onset   • Cancer Mother         Gallbladder Cancer   • Cancer Father 77        Brain Cancer   • No " Known Problems Sister    • No Known Problems Brother        Current Outpatient Medications:   •  amLODIPine (NORVASC) 5 MG tablet, TAKE 1 TABLET BY MOUTH EVERY DAY, Disp: 90 tablet, Rfl: 1  •  Biotin 10 MG tablet, Take 1 tablet by mouth Daily., Disp: , Rfl:   •  cetirizine (zyrTEC) 10 MG tablet, Take 10 mg by mouth Daily., Disp: , Rfl:   •  hydrOXYzine (ATARAX) 25 MG tablet, Take 1 tablet by mouth Daily As Needed for Itching., Disp: 40 tablet, Rfl: 0  Allergies   Allergen Reactions   • B.F.I. Rash     [facility]   • Benzocaine Rash   • Amoxicillin-Pot Clavulanate Hives   • Oxycodone Nausea And Vomiting     Social History     Socioeconomic History   • Marital status:      Spouse name: Not on file   • Number of children: Not on file   • Years of education: Not on file   • Highest education level: Not on file   Tobacco Use   • Smoking status: Never Smoker   • Smokeless tobacco: Never Used   Substance and Sexual Activity   • Alcohol use: No   • Drug use: No   • Sexual activity: Yes     Partners: Male     Birth control/protection: None     Comment: I am 74 years old not worried about getting pregnant.     Review of Systems   Constitutional: Negative for fever and malaise/fatigue.   Cardiovascular: Negative for chest pain, dyspnea on exertion, leg swelling and palpitations.   Respiratory: Negative for cough and shortness of breath.    Skin: Negative for rash.   Gastrointestinal: Negative for abdominal pain, nausea and vomiting.   Neurological: Negative for focal weakness, headaches, light-headedness and numbness.   All other systems reviewed and are negative.             Objective:     Constitutional:       Appearance: Well-developed.   Eyes:      General: No scleral icterus.     Conjunctiva/sclera: Conjunctivae normal.   HENT:      Head: Normocephalic and atraumatic.   Neck:      Vascular: No carotid bruit or JVD.   Pulmonary:      Effort: Pulmonary effort is normal.      Breath sounds: Normal breath sounds.  No wheezing. No rales.   Cardiovascular:      Normal rate. Regular rhythm.   Pulses:     Intact distal pulses.   Abdominal:      General: Bowel sounds are normal.      Palpations: Abdomen is soft.   Musculoskeletal:      Cervical back: Normal range of motion and neck supple. Skin:     General: Skin is warm and dry.      Findings: No rash.   Neurological:      Mental Status: Alert.       Procedures    Lab Review:         MDM   1 mitral valve disorder  Patient has mitral valve disorder with mild mitral regurgitation and is currently stable on medical therapy  Patient has normal LV systolic function  Patient will have an echocardiogram at her next visit  2.  Hypertension  Patient blood pressure currently stable on amlodipine 5 mg once a day  3.  Dyslipidemia  Patient's lipids are followed by the primary care doctor.

## 2021-07-27 ENCOUNTER — LAB (OUTPATIENT)
Dept: LAB | Facility: HOSPITAL | Age: 76
End: 2021-07-27

## 2021-07-27 ENCOUNTER — TELEPHONE (OUTPATIENT)
Dept: ONCOLOGY | Facility: CLINIC | Age: 76
End: 2021-07-27

## 2021-07-27 ENCOUNTER — OFFICE VISIT (OUTPATIENT)
Dept: ONCOLOGY | Facility: CLINIC | Age: 76
End: 2021-07-27

## 2021-07-27 VITALS
WEIGHT: 169 LBS | HEART RATE: 72 BPM | RESPIRATION RATE: 18 BRPM | BODY MASS INDEX: 28.85 KG/M2 | SYSTOLIC BLOOD PRESSURE: 152 MMHG | HEIGHT: 64 IN | TEMPERATURE: 97.4 F | DIASTOLIC BLOOD PRESSURE: 82 MMHG

## 2021-07-27 DIAGNOSIS — R91.1 LUNG NODULE: ICD-10-CM

## 2021-07-27 DIAGNOSIS — D64.9 ANEMIA, UNSPECIFIED TYPE: ICD-10-CM

## 2021-07-27 DIAGNOSIS — R44.3 HALLUCINATIONS: ICD-10-CM

## 2021-07-27 DIAGNOSIS — Z85.9 HISTORY OF CANCER: ICD-10-CM

## 2021-07-27 DIAGNOSIS — D64.9 ANEMIA, UNSPECIFIED TYPE: Primary | ICD-10-CM

## 2021-07-27 LAB
ALBUMIN SERPL-MCNC: 4.1 G/DL (ref 3.5–5.2)
ALBUMIN/GLOB SERPL: 1.5 G/DL
ALP SERPL-CCNC: 63 U/L (ref 39–117)
ALT SERPL W P-5'-P-CCNC: 12 U/L (ref 1–33)
ANION GAP SERPL CALCULATED.3IONS-SCNC: 10 MMOL/L (ref 5–15)
AST SERPL-CCNC: 16 U/L (ref 1–32)
BASOPHILS # BLD AUTO: 0.02 10*3/MM3 (ref 0–0.2)
BASOPHILS NFR BLD AUTO: 0.3 % (ref 0–1.5)
BILIRUB SERPL-MCNC: 0.3 MG/DL (ref 0–1.2)
BUN SERPL-MCNC: 18 MG/DL (ref 8–23)
BUN/CREAT SERPL: 18.9 (ref 7–25)
CALCIUM SPEC-SCNC: 8.8 MG/DL (ref 8.6–10.5)
CHLORIDE SERPL-SCNC: 103 MMOL/L (ref 98–107)
CO2 SERPL-SCNC: 26 MMOL/L (ref 22–29)
CREAT SERPL-MCNC: 0.95 MG/DL (ref 0.57–1)
DEPRECATED RDW RBC AUTO: 43.8 FL (ref 37–54)
EOSINOPHIL # BLD AUTO: 0.11 10*3/MM3 (ref 0–0.4)
EOSINOPHIL NFR BLD AUTO: 1.7 % (ref 0.3–6.2)
ERYTHROCYTE [DISTWIDTH] IN BLOOD BY AUTOMATED COUNT: 13 % (ref 12.3–15.4)
FERRITIN SERPL-MCNC: 184.9 NG/ML (ref 13–150)
GFR SERPL CREATININE-BSD FRML MDRD: 58 ML/MIN/1.73
GLOBULIN UR ELPH-MCNC: 2.7 GM/DL
GLUCOSE SERPL-MCNC: 87 MG/DL (ref 65–99)
HCT VFR BLD AUTO: 42.3 % (ref 34–46.6)
HGB BLD-MCNC: 13.5 G/DL (ref 12–15.9)
IRON 24H UR-MRATE: 93 MCG/DL (ref 37–145)
IRON SATN MFR SERPL: 27 % (ref 20–50)
LYMPHOCYTES # BLD AUTO: 2.38 10*3/MM3 (ref 0.7–3.1)
LYMPHOCYTES NFR BLD AUTO: 36.5 % (ref 19.6–45.3)
MCH RBC QN AUTO: 30.3 PG (ref 26.6–33)
MCHC RBC AUTO-ENTMCNC: 31.9 G/DL (ref 31.5–35.7)
MCV RBC AUTO: 95.1 FL (ref 79–97)
MONOCYTES # BLD AUTO: 0.45 10*3/MM3 (ref 0.1–0.9)
MONOCYTES NFR BLD AUTO: 6.9 % (ref 5–12)
NEUTROPHILS NFR BLD AUTO: 3.56 10*3/MM3 (ref 1.7–7)
NEUTROPHILS NFR BLD AUTO: 54.6 % (ref 42.7–76)
PLATELET # BLD AUTO: 219 10*3/MM3 (ref 140–450)
PMV BLD AUTO: 10.2 FL (ref 6–12)
POTASSIUM SERPL-SCNC: 4.5 MMOL/L (ref 3.5–5.2)
PROT SERPL-MCNC: 6.8 G/DL (ref 6–8.5)
RBC # BLD AUTO: 4.45 10*6/MM3 (ref 3.77–5.28)
SODIUM SERPL-SCNC: 139 MMOL/L (ref 136–145)
TIBC SERPL-MCNC: 349 MCG/DL (ref 298–536)
TRANSFERRIN SERPL-MCNC: 234 MG/DL (ref 200–360)
WBC # BLD AUTO: 6.52 10*3/MM3 (ref 3.4–10.8)

## 2021-07-27 PROCEDURE — 83540 ASSAY OF IRON: CPT | Performed by: INTERNAL MEDICINE

## 2021-07-27 PROCEDURE — 36415 COLL VENOUS BLD VENIPUNCTURE: CPT | Performed by: INTERNAL MEDICINE

## 2021-07-27 PROCEDURE — 85025 COMPLETE CBC W/AUTO DIFF WBC: CPT

## 2021-07-27 PROCEDURE — 99215 OFFICE O/P EST HI 40 MIN: CPT | Performed by: INTERNAL MEDICINE

## 2021-07-27 PROCEDURE — 80053 COMPREHEN METABOLIC PANEL: CPT | Performed by: INTERNAL MEDICINE

## 2021-07-27 PROCEDURE — 84466 ASSAY OF TRANSFERRIN: CPT | Performed by: INTERNAL MEDICINE

## 2021-07-27 PROCEDURE — 82728 ASSAY OF FERRITIN: CPT | Performed by: INTERNAL MEDICINE

## 2021-07-27 RX ORDER — TETRACYCLINE HCL 500 MG
CAPSULE ORAL
COMMUNITY
Start: 2021-06-01 | End: 2022-01-06

## 2021-07-27 NOTE — PROGRESS NOTES
Hematology/Oncology Outpatient Follow Up    PATIENT NAME:Sandra Gonzalez  :1945  MRN: [id number]  PRIMARY CARE PHYSICIAN: Natalie Garcia DO  REFERRING PHYSICIAN: Natalie Garcia DO    Chief Complaint   Patient presents with   • Follow-up     Hypercalcemia, pulmonary nodule        HISTORY OF PRESENT ILLNESS:     This is a 74-year-old female who is here today due to elevated calcium level in her blood.  Patient states that she has been on oral calcium supplementation along with vitamin D.  On a routine biochemical testing she was found to have elevated calcium to 11.  An ionized calcium level was noted to be 1.5.  Patient denies any night sweats or fatigue symptoms.  She has been on weight watchers diet and has lost approximately more than 20 pounds intentionally.  She has occasional right hip pain right joint aching pain.  Overall she feels well otherwise.  Also on her routine lab test was found to have an elevated ferritin to 279 on 2020.  She has no history of iron overload issues.  Her serum iron and  serum iron saturation were normal.  There is no family history of liver cancer, cirrhosis of the liver.     We have her labs from   Which showed her white count was 6.2, hemoglobin 13.3 and platelets were 236, her differentials where 57% neutrophils, 34% lymphocytes, there was no monocytosis, eosinophilia or basophilia.  BUN was 17, creatinine 0.93 and serum calcium was 11.  She has normal total protein and albumin levels.  Liver function tests were also unremarkable.  Serum iron was normal at 86, iron saturation was normal at 28.  TSH was normal and vitamin D1 was also normal at 43.1.  Sed rate was 6.     Patient also has a history of bladder cancer, kidney cancer and cervical cancer.  · 2020 patient had a CT scan of the chest, abdomen and pelvis, this revealed nonspecific 4 to 5 mm pulmonary nodules in the lower lobes follow-up CT of the chest in 3 months has been recommended in the  abdomen there where probable hepatic cyst but no evidence of masses in the abdomen.  · 2/17/2020 patient had a parathyroid hormone level which was elevated at 121, sed rate was normal at 10, C-reactive protein was normal at 0.06 IgA was normal at 157, IgG was normal at 935 and IgM normal at 70 her white count was 7.7, hemoglobin 13.9 and platelets were 224 with unremarkable differential.  Patient had HFE analysis which did not show any mutations.  SPEP with ALISA did not show any evidence of monoclonal protein.  She has normal free light chains.  · 3/9/2020-patient was referred to Dr. Burnham for elevated parathyroid hormone.  Further work-up has been recommended including 24-hour urine calcium evaluation, and DEXA scan patient also will be referred to surgeon by Dr. Burnham  · 6/2/2020 patient had iron studies which showed a normal serum iron 95, iron saturation was normal at 25%.  TIBC is normal at 337.  Ferritin is 195  · 6/2/2020-patient had a serum calcium level which was normal at 10  · 6/9/2020-patient had CT scan of the chest which showed stable pulmonary nodules.  Follow-up in 6 months has been recommended      Past Medical History:   Diagnosis Date   • Achilles tendinitis    • Allergic    • Bladder Cancer     2016/ 2019 Bladder Tumor Sx----------Dr. Saldana    • DDD, lumbar    • DEXA     2019 = OSTEOPENIA   • Hyperlipidemia    • Hypertension    • Lower back pain    • MAMMO     NEG = 2019/ 2020   • Mitral valve prolapse    • Pulmonary nodule    • Renal Cell Cancer    • Rheumatic fever    • Vitamin D deficiency        Past Surgical History:   Procedure Laterality Date   • ADENOIDECTOMY     • APPENDECTOMY     • BLADDER TUMOR EXCISION  05/2019    x 2   • CHOLECYSTECTOMY     • COLONOSCOPY  07/2018    recheck in 2023 at [facility]   • NEPHRECTOMY  09/2009    left nephrectomy   • TONSILLECTOMY     • TOTAL ABDOMINAL HYSTERECTOMY WITH SALPINGO OOPHORECTOMY           Current Outpatient Medications:   •  Apple Cider  Vinegar 500 MG tablet, , Disp: , Rfl:   •  amLODIPine (NORVASC) 5 MG tablet, TAKE 1 TABLET BY MOUTH EVERY DAY, Disp: 90 tablet, Rfl: 1  •  Biotin 10 MG tablet, Take 1 tablet by mouth Daily., Disp: , Rfl:   •  hydrOXYzine (ATARAX) 25 MG tablet, Take 1 tablet by mouth Daily As Needed for Itching., Disp: 40 tablet, Rfl: 0    Allergies   Allergen Reactions   • B.F.I. Rash     [employer] [place]   • Benzocaine Rash   • Amoxicillin-Pot Clavulanate Hives   • Oxycodone Nausea And Vomiting       Family History   Problem Relation Age of Onset   • Cancer Mother         Gallbladder Cancer   • Cancer Father 77        Brain Cancer   • No Known Problems Sister    • No Known Problems Brother        Cancer-related family history includes Cancer in her mother; Cancer (age of onset: 77) in her father.    Social History     Tobacco Use   • Smoking status: Never Smoker   • Smokeless tobacco: Never Used   Substance Use Topics   • Alcohol use: No   • Drug use: No       I have reviewed and confirmed the accuracy of the patient's history: Chief complaint, HPI and ROS as entered by the MA/LPN/RN. Cinthya Hall MD 07/27/21     SUBJECTIVE:    The patient is here for a follow up appointment .  She has developed urticarial rash involving her back and arms.  Patient has been seen by dermatologist and receiving steroidal injection.    Rash seems to have resolved.  She recently developed auditory hallucinosis.  But denies headaches, blurry vision.  This has been going on for several months.          REVIEW OF SYSTEMS:  Review of Systems   Constitutional: Negative for chills and fever.   HENT: Negative for ear pain, mouth sores, nosebleeds and sore throat.    Eyes: Negative for photophobia and visual disturbance.   Respiratory: Negative for wheezing and stridor.    Cardiovascular: Negative for chest pain and palpitations.   Gastrointestinal: Negative for abdominal pain, diarrhea, nausea and vomiting.   Endocrine: Negative for cold intolerance  "and heat intolerance.   Genitourinary: Negative for dysuria and hematuria.   Musculoskeletal: Negative for joint swelling and neck stiffness.   Skin: Negative for color change and rash.   Neurological: Negative for seizures and syncope.   Hematological: Negative for adenopathy.        No obvious bleeding   Psychiatric/Behavioral: Negative for agitation, confusion and hallucinations.     Rash on the  back  OBJECTIVE:    Vitals:    07/27/21 1129   BP: 152/82   Pulse: 72   Resp: 18   Temp: 97.4 °F (36.3 °C)   TempSrc: Infrared   Weight: 76.7 kg (169 lb)   Height: 162.6 cm (64\")   PainSc: 0-No pain     Body mass index is 29.01 kg/m².    ECOG  (1) Restricted in physically strenuous activity, ambulatory and able to do work of light nature    Physical Exam   Constitutional: She is oriented to person, place, and time. No distress.   HENT:   Head: Normocephalic and atraumatic.   Eyes: Conjunctivae are normal. Right eye exhibits no discharge. Left eye exhibits no discharge. No scleral icterus.   Neck: No thyromegaly present.   Cardiovascular: Normal rate, regular rhythm and normal heart sounds. Exam reveals no gallop and no friction rub.   Pulmonary/Chest: Effort normal. No stridor. No respiratory distress. She has no wheezes.   Abdominal: Soft. Bowel sounds are normal. She exhibits no mass. There is no abdominal tenderness. There is no rebound and no guarding.   Musculoskeletal: Normal range of motion. No tenderness.   Lymphadenopathy:     She has no cervical adenopathy.   Neurological: She is alert and oriented to person, place, and time. She exhibits normal muscle tone.   Skin: Skin is warm. Rash noted. She is not diaphoretic. No erythema.   Psychiatric: Her behavior is normal.   Nursing note and vitals reviewed.    I have reexamined the patient and the results are consistent with the previously documented exam. Cinthya Hall MD   RECENT LABS    WBC   Date Value Ref Range Status   07/27/2021 6.52 3.40 - 10.80 " 10*3/mm3 Final   12/03/2020 6.40 4.5 - 11.0 10*3/uL Final     RBC   Date Value Ref Range Status   07/27/2021 4.45 3.77 - 5.28 10*6/mm3 Final   12/03/2020 4.37 4.0 - 5.2 10*6/uL Final     Hemoglobin   Date Value Ref Range Status   07/27/2021 13.5 12.0 - 15.9 g/dL Final   12/03/2020 13.5 12.0 - 16.0 g/dL Final     Hematocrit   Date Value Ref Range Status   07/27/2021 42.3 34.0 - 46.6 % Final   12/03/2020 41.6 36.0 - 46.0 % Final     MCV   Date Value Ref Range Status   07/27/2021 95.1 79.0 - 97.0 fL Final   12/03/2020 95.2 80.0 - 100.0 fL Final     MCH   Date Value Ref Range Status   07/27/2021 30.3 26.6 - 33.0 pg Final   12/03/2020 30.9 26.0 - 34.0 pg Final     MCHC   Date Value Ref Range Status   07/27/2021 31.9 31.5 - 35.7 g/dL Final   12/03/2020 32.5 31.0 - 37.0 g/dL Final     RDW   Date Value Ref Range Status   07/27/2021 13.0 12.3 - 15.4 % Final   12/03/2020 13.0 12.0 - 16.8 % Final     RDW-SD   Date Value Ref Range Status   07/27/2021 43.8 37.0 - 54.0 fl Final     MPV   Date Value Ref Range Status   07/27/2021 10.2 6.0 - 12.0 fL Final   12/03/2020 10.9 8.4 - 12.4 fL Final     Platelets   Date Value Ref Range Status   07/27/2021 219 140 - 450 10*3/mm3 Final   12/03/2020 208 140 - 440 10*3/uL Final     Neutrophil Rel %   Date Value Ref Range Status   12/03/2020 58.4 45 - 80 % Final     Neutrophil %   Date Value Ref Range Status   07/27/2021 54.6 42.7 - 76.0 % Final     Lymphocyte Rel %   Date Value Ref Range Status   12/03/2020 31.7 15 - 50 % Final     Lymphocyte %   Date Value Ref Range Status   07/27/2021 36.5 19.6 - 45.3 % Final     Monocyte Rel %   Date Value Ref Range Status   12/03/2020 6.9 0 - 15 % Final     Monocyte %   Date Value Ref Range Status   07/27/2021 6.9 5.0 - 12.0 % Final     Eosinophil %   Date Value Ref Range Status   07/27/2021 1.7 0.3 - 6.2 % Final   12/03/2020 2.2 0 - 7 % Final     Basophil Rel %   Date Value Ref Range Status   12/03/2020 0.5 0 - 2 % Final     Basophil %   Date Value Ref  Range Status   07/27/2021 0.3 0.0 - 1.5 % Final     Immature Grans %   Date Value Ref Range Status   12/03/2020 0.3 0.0 - 1.0 % Final     Neutrophils Absolute   Date Value Ref Range Status   12/03/2020 3.74 2.0 - 8.8 10*3/uL Final     Neutrophils, Absolute   Date Value Ref Range Status   07/27/2021 3.56 1.70 - 7.00 10*3/mm3 Final     Lymphocytes Absolute   Date Value Ref Range Status   12/03/2020 2.03 0.7 - 5.5 10*3/uL Final     Lymphocytes, Absolute   Date Value Ref Range Status   07/27/2021 2.38 0.70 - 3.10 10*3/mm3 Final     Monocytes Absolute   Date Value Ref Range Status   12/03/2020 0.44 0.0 - 1.7 10*3/uL Final     Monocytes, Absolute   Date Value Ref Range Status   07/27/2021 0.45 0.10 - 0.90 10*3/mm3 Final     Eosinophils Absolute   Date Value Ref Range Status   12/03/2020 0.14 0.0 - 0.8 10*3/uL Final     Eosinophils, Absolute   Date Value Ref Range Status   07/27/2021 0.11 0.00 - 0.40 10*3/mm3 Final     Basophils Absolute   Date Value Ref Range Status   12/03/2020 0.03 0.0 - 0.2 10*3/uL Final     Basophils, Absolute   Date Value Ref Range Status   07/27/2021 0.02 0.00 - 0.20 10*3/mm3 Final     Immature Grans, Absolute   Date Value Ref Range Status   12/03/2020 0.02 0.00 - 0.10 10*3/uL Final     nRBC   Date Value Ref Range Status   12/03/2020 0 0 /100(WBC) Final       Lab Results   Component Value Date    GLUCOSE 87 07/27/2021    BUN 18 07/27/2021    CREATININE 0.95 07/27/2021    EGFRIFNONA 58 (L) 07/27/2021    BCR 18.9 07/27/2021    K 4.5 07/27/2021    CO2 26.0 07/27/2021    CALCIUM 8.8 07/27/2021    PROTENTOTREF 6.7 02/17/2020    ALBUMIN 4.10 07/27/2021    LABIL2 1.4 02/17/2020    AST 16 07/27/2021    ALT 12 07/27/2021         ASSESSMENT:    1. Hypercalcemia due to hyperparathyroidism: Followed by endocrinology status post parathyroidectomy  2. Elevated parathyroid hormone level: Status post para thyroidectomy fall 2020  3. Pulmonary nodules 4 to 5 mm. Ongoing management stable CT scan follow-up in 6  months recommended: Reviewed: Patient is now due for a follow-up CT of the chest.  Will order today  4. Elevated ferritin level which may be reactive versus iron overload syndrome.  Patient has normal serum iron, and iron saturation.  HFE analysis with no evidence of mutation.  Ferritin level decreasing: Check iron studies today  5. Personal history of multiple malignancies including kidney cancer, bladder cancer and cervical cancer.  Formation given on cancer genetics today  6. Urticaria: Followed by dermatology and receiving steroidal injection  7. New auditory hallucination    PLANS:    Pulmonary nodules remain stable: Follow-up CT of the chest in 6 months has been recommended: This will be ordered at this time as she is due    Obtain MRI of the brain due to auditory hallucination as soon as possible.    CMP today      Iron studies with ferritin    Noncontrast CT scan of the chest to follow-up on pulmonary nodule     Follow-up with endocrinology with Dr. Burnham for hyperparathyroidism    She has elevated ferritin level but normal serum iron and iron saturation also had mutational analysis for HFE is negative.  She does not have clinical evidence of iron overload syndrome but I did let her know that we will monitor her iron levels.  Her ferritin level continues to decrease.     Follow-up in 6 months or earlier for problems    She has history of 3 primary cancers including kidney, bladder and cervical cancer .  Information given on cancer genetics.  Patient to schedule for high risk evaluation            I have reviewed labs results, imaging, vitals, and medications with the patient today. Will follow up in 6 months with me.    Patient verbalized understanding and is in agreement of the above plan.      I spent 40 total minutes, face-to-face, caring for Sandra today.  90% of this time involved counseling and/or coordination of care as documented within this note.

## 2021-07-27 NOTE — TELEPHONE ENCOUNTER
Contacted scheduling for MRI.    Appointment made for 08/28 at 1300. Contacted patient to let her know time and date. She confirmed and had no questions.

## 2021-07-28 ENCOUNTER — HOSPITAL ENCOUNTER (OUTPATIENT)
Dept: MRI IMAGING | Facility: HOSPITAL | Age: 76
Discharge: HOME | End: 2021-07-28
Admitting: INTERNAL MEDICINE

## 2021-07-28 DIAGNOSIS — R44.3 HALLUCINATIONS: ICD-10-CM

## 2021-07-28 DIAGNOSIS — Z85.9 HISTORY OF CANCER: ICD-10-CM

## 2021-07-28 PROCEDURE — 70553 MRI BRAIN STEM W/O & W/DYE: CPT

## 2021-07-28 PROCEDURE — 25010000002 GADOTERIDOL PER 1 ML: Performed by: INTERNAL MEDICINE

## 2021-07-28 PROCEDURE — A9579 GAD-BASE MR CONTRAST NOS,1ML: HCPCS | Performed by: INTERNAL MEDICINE

## 2021-07-28 RX ADMIN — GADOTERIDOL 15 ML: 279.3 INJECTION, SOLUTION INTRAVENOUS at 13:36

## 2021-07-29 ENCOUNTER — TELEPHONE (OUTPATIENT)
Dept: ONCOLOGY | Facility: CLINIC | Age: 76
End: 2021-07-29

## 2021-07-29 NOTE — TELEPHONE ENCOUNTER
Called pt to let her know her brain scan was normal and negative for an acute process. I let her know Dr. Hall wanted her to follow-up with her PCP. She had no new questions.

## 2021-08-02 ENCOUNTER — HOSPITAL ENCOUNTER (OUTPATIENT)
Dept: CT IMAGING | Facility: HOSPITAL | Age: 76
Discharge: HOME | End: 2021-08-02
Admitting: INTERNAL MEDICINE

## 2021-08-02 DIAGNOSIS — R91.1 LUNG NODULE: ICD-10-CM

## 2021-08-02 PROCEDURE — 71250 CT THORAX DX C-: CPT

## 2021-08-23 RX ORDER — AMLODIPINE BESYLATE 5 MG/1
TABLET ORAL
Qty: 90 TABLET | Refills: 0 | Status: SHIPPED | OUTPATIENT
Start: 2021-08-23 | End: 2021-10-22 | Stop reason: SDUPTHER

## 2021-08-23 NOTE — TELEPHONE ENCOUNTER
Rx Refill Note  Requested Prescriptions     Pending Prescriptions Disp Refills   • amLODIPine (NORVASC) 5 MG tablet [Pharmacy Med Name: amLODIPine Besylate Oral Tablet 5 MG] 90 tablet 0     Sig: TAKE 1 TABLET BY MOUTH EVERY DAY      Last office visit with prescribing clinician: 9/29/2020      Next office visit with prescribing clinician: 10/22/2021     Comprehensive Metabolic Panel (07/27/2021 10:41)  Lipid Panel (09/22/2020 09:05)         Tosha Hughes CMA  08/23/21, 09:04 EDT

## 2021-10-22 ENCOUNTER — OFFICE VISIT (OUTPATIENT)
Dept: FAMILY MEDICINE CLINIC | Facility: CLINIC | Age: 76
End: 2021-10-22

## 2021-10-22 VITALS
RESPIRATION RATE: 14 BRPM | BODY MASS INDEX: 28.85 KG/M2 | SYSTOLIC BLOOD PRESSURE: 130 MMHG | WEIGHT: 169 LBS | DIASTOLIC BLOOD PRESSURE: 76 MMHG | OXYGEN SATURATION: 100 % | TEMPERATURE: 97.1 F | HEART RATE: 71 BPM | HEIGHT: 64 IN

## 2021-10-22 DIAGNOSIS — Z23 NEED FOR INFLUENZA VACCINATION: ICD-10-CM

## 2021-10-22 DIAGNOSIS — I10 PRIMARY HYPERTENSION: ICD-10-CM

## 2021-10-22 DIAGNOSIS — Z00.00 MEDICARE ANNUAL WELLNESS VISIT, SUBSEQUENT: Primary | ICD-10-CM

## 2021-10-22 DIAGNOSIS — E78.2 MIXED HYPERLIPIDEMIA: ICD-10-CM

## 2021-10-22 DIAGNOSIS — Z12.31 ENCOUNTER FOR SCREENING MAMMOGRAM FOR MALIGNANT NEOPLASM OF BREAST: ICD-10-CM

## 2021-10-22 PROCEDURE — 90662 IIV NO PRSV INCREASED AG IM: CPT | Performed by: FAMILY MEDICINE

## 2021-10-22 PROCEDURE — 1160F RVW MEDS BY RX/DR IN RCRD: CPT | Performed by: FAMILY MEDICINE

## 2021-10-22 PROCEDURE — 1170F FXNL STATUS ASSESSED: CPT | Performed by: FAMILY MEDICINE

## 2021-10-22 PROCEDURE — G0439 PPPS, SUBSEQ VISIT: HCPCS | Performed by: FAMILY MEDICINE

## 2021-10-22 PROCEDURE — G0008 ADMIN INFLUENZA VIRUS VAC: HCPCS | Performed by: FAMILY MEDICINE

## 2021-10-22 RX ORDER — AMLODIPINE BESYLATE 5 MG/1
5 TABLET ORAL DAILY
Qty: 90 TABLET | Refills: 1 | Status: SHIPPED | OUTPATIENT
Start: 2021-10-22 | End: 2022-01-06 | Stop reason: SDUPTHER

## 2021-10-22 NOTE — PROGRESS NOTES
The ABCs of the Annual Wellness Visit  Subsequent Medicare Wellness Visit    Chief Complaint   Patient presents with   • Medicare Wellness-subsequent       Subjective   History of Present Illness:  Sandra Gonzalez is a 76 y.o. female who presents for a Subsequent Medicare Wellness Visit.    HEALTH RISK ASSESSMENT    Recent Hospitalizations:  No hospitalization(s) within the last year.    Current Medical Providers:  Patient Care Team:  Natalie Garcia DO as PCP - General (Family Medicine)  Jorge Oakes MD as Consulting Physician (Cardiology)  Cinthya Hall MD as Consulting Physician (Hematology and Oncology)  Donald Sibley MD as Consulting Physician (Dermatology)  Hector Burhnam MD as Consulting Physician (Endocrinology)    Smoking Status:  Social History     Tobacco Use   Smoking Status Never Smoker   Smokeless Tobacco Never Used       Alcohol Consumption:  Social History     Substance and Sexual Activity   Alcohol Use No       Depression Screen:   PHQ-2/PHQ-9 Depression Screening 10/22/2021   Little interest or pleasure in doing things 0   Feeling down, depressed, or hopeless 0   Trouble falling or staying asleep, or sleeping too much 0   Feeling tired or having little energy 0   Poor appetite or overeating 0   Feeling bad about yourself - or that you are a failure or have let yourself or your family down 0   Trouble concentrating on things, such as reading the newspaper or watching television 0   Moving or speaking so slowly that other people could have noticed. Or the opposite - being so fidgety or restless that you have been moving around a lot more than usual 0   Thoughts that you would be better off dead, or of hurting yourself in some way 0   Total Score 0   If you checked off any problems, how difficult have these problems made it for you to do your work, take care of things at home, or get along with other people? Not difficult at all       Fall Risk Screen:  STEADI Fall Risk Assessment  has not been completed.    Health Habits and Functional and Cognitive Screening:  Functional & Cognitive Status 10/22/2021   Do you have difficulty preparing food and eating? No   Do you have difficulty bathing yourself, getting dressed or grooming yourself? No   Do you have difficulty using the toilet? No   Do you have difficulty moving around from place to place? No   Do you have trouble with steps or getting out of a bed or a chair? No   Current Diet Well Balanced Diet   Dental Exam Up to date   Eye Exam Up to date   Exercise (times per week) 0 times per week   Current Exercises Include No Regular Exercise   Do you need help using the phone?  No   Are you deaf or do you have serious difficulty hearing?  Yes   Do you need help with transportation? No   Do you need help shopping? No   Do you need help preparing meals?  No   Do you need help with housework?  No   Do you need help with laundry? No   Do you need help taking your medications? No   Do you need help managing money? No   Do you ever drive or ride in a car without wearing a seat belt? No   Have you felt unusual stress, anger or loneliness in the last month? No   Who do you live with? Spouse   If you need help, do you have trouble finding someone available to you? No   Have you been bothered in the last four weeks by sexual problems? No   Do you have difficulty concentrating, remembering or making decisions? No         Does the patient have evidence of cognitive impairment? No    Asprin use counseling:Does not need ASA (and currently is not on it)    Age-appropriate Screening Schedule:  Refer to the list below for future screening recommendations based on patient's age, sex and/or medical conditions. Orders for these recommended tests are listed in the plan section. The patient has been provided with a written plan.    Health Maintenance   Topic Date Due   • TDAP/TD VACCINES (1 - Tdap) Never done   • ZOSTER VACCINE (2 of 3) 12/27/2014   • DXA SCAN   "12/26/2015   • LIPID PANEL  09/22/2021   • MAMMOGRAM  10/15/2022   • INFLUENZA VACCINE  Completed          The following portions of the patient's history were reviewed and updated as appropriate: allergies, current medications, past family history, past medical history, past social history, past surgical history and problem list.    Outpatient Medications Prior to Visit   Medication Sig Dispense Refill   • Apple Cider Vinegar 500 MG tablet      • Biotin 10 MG tablet Take 1 tablet by mouth Daily.     • hydrOXYzine (ATARAX) 25 MG tablet Take 1 tablet by mouth Daily As Needed for Itching. 40 tablet 0   • amLODIPine (NORVASC) 5 MG tablet TAKE 1 TABLET BY MOUTH EVERY DAY  90 tablet 0     No facility-administered medications prior to visit.       Patient Active Problem List   Diagnosis   • Abnormal urinalysis   • Achilles tendinitis   • Allergic rhinitis, seasonal   • Bladder cancer (HCC)   • Body mass index (BMI) of 25.0 to 29.9   • Carcinoma, renal cell (HCC)   • Degeneration of intervertebral disc of lumbar region   • Dyslipidemia   • Hypertension   • Low back pain   • Mitral regurgitation   • Sciatica   • Vitamin D deficiency   • Primary hyperparathyroidism (HCC)       Advanced Care Planning:  ACP discussion was held with the patient during this visit. Patient has an advance directive in EMR which is still valid.     Review of Systems    Compared to one year ago, the patient feels her physical health is the same.  Compared to one year ago, the patient feels her mental health is the same.    Reviewed chart for potential of high risk medication in the elderly: yes  Reviewed chart for potential of harmful drug interactions in the elderly:yes    Objective         Vitals:    10/22/21 1045   BP: 130/76   BP Location: Right arm   Patient Position: Sitting   Cuff Size: Adult   Pulse: 71   Resp: 14   Temp: 97.1 °F (36.2 °C)   TempSrc: Temporal   SpO2: 100%   Weight: 76.7 kg (169 lb)   Height: 162.6 cm (64\")       Body mass " index is 29.01 kg/m².  Discussed the patient's BMI with her. The BMI is in the acceptable range.    Physical Exam  Vitals and nursing note reviewed.   Constitutional:       General: She is not in acute distress.     Appearance: She is not ill-appearing or toxic-appearing.   HENT:      Head: Normocephalic and atraumatic.   Cardiovascular:      Rate and Rhythm: Normal rate and regular rhythm.   Pulmonary:      Effort: Pulmonary effort is normal. No respiratory distress.      Breath sounds: No stridor. No wheezing, rhonchi or rales.   Musculoskeletal:      Left lower leg: No edema.   Neurological:      Mental Status: She is alert and oriented to person, place, and time.      Cranial Nerves: No cranial nerve deficit.   Psychiatric:         Behavior: Behavior normal.         Thought Content: Thought content normal.         Judgment: Judgment normal.                   Assessment/Plan   Medicare Risks and Personalized Health Plan  CMS Preventative Services Quick Reference  Advance Directive Discussion  Breast Cancer/Mammogram Screening  Colon Cancer Screening  Dementia/Memory   Depression/Dysphoria  Diabetic Lab Screening   Fall Risk  Glaucoma Risk  Hearing Problem  Immunizations Discussed/Encouraged (specific immunizations; Influenza, Pneumococcal 23, Shingrix and COVID19 )  Inadequate Social Support, Isolation, Loneliness, Lack of Transportation, Financial Difficulties, or Caregiver Stress   Inactivity/Sedentary  Obesity/Overweight   Osteoporosis Risk  Urinary Incontinence    The above risks/problems have been discussed with the patient.  Pertinent information has been shared with the patient in the After Visit Summary.  Follow up plans and orders are seen below in the Assessment/Plan Section.    Diagnoses and all orders for this visit:    1. Medicare annual wellness visit, subsequent (Primary)    2. Primary hypertension    3. Mixed hyperlipidemia  -     Lipid Panel; Future    4. Need for influenza vaccination  -      Fluzone High-Dose 65+yrs    5. Encounter for screening mammogram for malignant neoplasm of breast  -     Mammo Screening Digital Tomosynthesis Bilateral With CAD; Future    Other orders  -     amLODIPine (NORVASC) 5 MG tablet; Take 1 tablet by mouth Daily.  Dispense: 90 tablet; Refill: 1      Follow Up:  No follow-ups on file.     An After Visit Summary and PPPS were given to the patient.

## 2021-11-12 ENCOUNTER — HOSPITAL ENCOUNTER (OUTPATIENT)
Dept: MAMMOGRAPHY | Facility: HOSPITAL | Age: 76
Discharge: HOME | End: 2021-11-12
Admitting: FAMILY MEDICINE

## 2021-11-12 DIAGNOSIS — Z12.31 ENCOUNTER FOR SCREENING MAMMOGRAM FOR MALIGNANT NEOPLASM OF BREAST: ICD-10-CM

## 2021-11-12 DIAGNOSIS — R92.8 ABNORMAL MAMMOGRAM OF RIGHT BREAST: Primary | ICD-10-CM

## 2021-11-12 PROCEDURE — 77063 BREAST TOMOSYNTHESIS BI: CPT

## 2021-11-12 PROCEDURE — 77067 SCR MAMMO BI INCL CAD: CPT

## 2021-11-23 ENCOUNTER — HOSPITAL ENCOUNTER (OUTPATIENT)
Dept: MAMMOGRAPHY | Facility: HOSPITAL | Age: 76
Discharge: HOME | End: 2021-11-23

## 2021-11-23 ENCOUNTER — HOSPITAL ENCOUNTER (OUTPATIENT)
Dept: ULTRASOUND IMAGING | Facility: HOSPITAL | Age: 76
Discharge: HOME | End: 2021-11-23

## 2021-11-23 DIAGNOSIS — R92.8 ABNORMAL MAMMOGRAM OF RIGHT BREAST: ICD-10-CM

## 2021-11-23 PROCEDURE — G0279 TOMOSYNTHESIS, MAMMO: HCPCS

## 2021-11-23 PROCEDURE — 77065 DX MAMMO INCL CAD UNI: CPT

## 2021-12-01 ENCOUNTER — APPOINTMENT (OUTPATIENT)
Dept: MAMMOGRAPHY | Facility: HOSPITAL | Age: 76
End: 2021-12-01

## 2021-12-01 ENCOUNTER — APPOINTMENT (OUTPATIENT)
Dept: ULTRASOUND IMAGING | Facility: HOSPITAL | Age: 76
End: 2021-12-01

## 2021-12-22 ENCOUNTER — TELEPHONE (OUTPATIENT)
Dept: FAMILY MEDICINE CLINIC | Facility: CLINIC | Age: 76
End: 2021-12-22

## 2021-12-22 RX ORDER — BUSPIRONE HYDROCHLORIDE 5 MG/1
5 TABLET ORAL 3 TIMES DAILY PRN
Qty: 21 TABLET | Refills: 0 | Status: SHIPPED | OUTPATIENT
Start: 2021-12-22 | End: 2021-12-23 | Stop reason: SDUPTHER

## 2021-12-22 NOTE — TELEPHONE ENCOUNTER
Caller: Sandra Gonzalez    Relationship: Self    Best call back number: [phone]    What medication are you requesting: SOMETHING TO RELIEVE ANXIETY    What are your current symptoms: ANXIETY WITH HAVING TO REMEMBER EVERYTHING NEEDS TO DO.  PATIENT STATED THAT SHE IS HAVING HEART PALPITATIONS WHEN HER ANXIETY IS HIGH.    How long have you been experiencing symptoms: 6 MONTHS    Have you had these symptoms before:    [] Yes  [x] No    Have you been treated for these symptoms before:   [] Yes  [x] No    If a prescription is needed, what is your preferred pharmacy and phone number: [facility] [id number] [place], [place] - [address] [facility] [phone] [facility] [phone]      Additional notes:PATIENTS  HAS SHORT TERM MEMORY LOSS AND PATIENT IS STRESSED ON TRYING TO REMEMBER EVERYTHING SHE NEEDS TO DO AND ALSO TRYING TO BE SURE HER  IS SAFE AND HELPING HIM TO COPE WITH HIS MEMORY LOSS.    PATIENT REQUESTING SOMETHING NATURAL OR OVER THE COUNTER, DOESN'T NECESSARILY HAVE TO BE A PRESCRIPTION DRUG TO SEE IF ANYTHING CAN HELP WITH HER SITUATION.    PATIENT IS LEAVING OUT OF TOWN TODAY AND WILL RETURN SOMETIME AFTER THE 27TH PATIENT REQUESTING ADVISE TODAY IF POSSIBLE.

## 2021-12-23 ENCOUNTER — TELEPHONE (OUTPATIENT)
Dept: FAMILY MEDICINE CLINIC | Facility: CLINIC | Age: 76
End: 2021-12-23

## 2021-12-23 RX ORDER — BUSPIRONE HYDROCHLORIDE 5 MG/1
5 TABLET ORAL 3 TIMES DAILY PRN
Qty: 45 TABLET | Refills: 0 | Status: SHIPPED | OUTPATIENT
Start: 2021-12-23 | End: 2022-01-25

## 2021-12-23 NOTE — TELEPHONE ENCOUNTER
PATIENT CALLED TO SCHEDULE APPT WITH DR MENDOZA FOR ANXIETY     DR HARRY PRESCRIBED BUSPAR ON 12-22 FOR 7 DAYS, BUT THE SOONEST AVAILABLE APPT WITH DR MENDOZA WAS ON JAN 6TH     CAN YOU CALL IN ENOUGH MEDS TO LAST HER UNTIL HER APPT WITH DR MENDOZA?     busPIRone (BUSPAR) 5 MG tablet  5 mg, 3 Times Daily PRN 0 ordered         Summary: Take 1 tablet by mouth 3 (Three) Times a Day As Needed (anxiety        PHARMACY  [facility] [id number] [facility] [address] - [phone]  - [phone]   [phone]    CAN YOU CALL HER ASAP AND ADVISE -- SHE IS GOING OUT OF TOWN TODAY AND WONT BE BACK UNTIL 27TH    Sandra Gonzalez (Self) [phone] (H)

## 2022-01-06 ENCOUNTER — OFFICE VISIT (OUTPATIENT)
Dept: FAMILY MEDICINE CLINIC | Facility: CLINIC | Age: 77
End: 2022-01-06

## 2022-01-06 VITALS
SYSTOLIC BLOOD PRESSURE: 126 MMHG | BODY MASS INDEX: 28.85 KG/M2 | HEART RATE: 92 BPM | DIASTOLIC BLOOD PRESSURE: 76 MMHG | WEIGHT: 169 LBS | TEMPERATURE: 97.1 F | OXYGEN SATURATION: 100 % | HEIGHT: 64 IN | RESPIRATION RATE: 14 BRPM

## 2022-01-06 DIAGNOSIS — F41.9 ANXIETY: Primary | ICD-10-CM

## 2022-01-06 DIAGNOSIS — I10 PRIMARY HYPERTENSION: ICD-10-CM

## 2022-01-06 DIAGNOSIS — F43.9 STRESS AT HOME: ICD-10-CM

## 2022-01-06 PROCEDURE — 99213 OFFICE O/P EST LOW 20 MIN: CPT | Performed by: FAMILY MEDICINE

## 2022-01-06 RX ORDER — CETIRIZINE HYDROCHLORIDE 10 MG/1
10 TABLET ORAL DAILY
COMMUNITY
End: 2022-09-20

## 2022-01-06 RX ORDER — AMLODIPINE BESYLATE 5 MG/1
5 TABLET ORAL DAILY
Qty: 90 TABLET | Refills: 1 | Status: SHIPPED | OUTPATIENT
Start: 2022-01-06 | End: 2022-03-24 | Stop reason: SDUPTHER

## 2022-01-06 RX ORDER — ESCITALOPRAM OXALATE 5 MG/1
5 TABLET ORAL NIGHTLY
Qty: 30 TABLET | Refills: 1 | Status: SHIPPED | OUTPATIENT
Start: 2022-01-06 | End: 2022-03-04 | Stop reason: SDUPTHER

## 2022-01-06 NOTE — PROGRESS NOTES
Answers for HPI/ROS submitted by the patient on 12/30/2021  Please describe your symptoms.: I am under a lot of stress due to husbands memory loss. I have been having heart palpatations a lot. This has been worse the last few weeks due to us having a lot of doctor appointments for him. I am having to remenber everything for two people. I get stressed and have been crying and trying not to show ny  i am stressed. I called your office the other day and Dr Cerda prescribed BuSparone (not sure spelled right) that has helped with heart flutters but i have had a mild head ache ever since, hoping that will go away.  Have you had these symptoms before?: No  How long have you been having these symptoms?: Greater than 2 weeks  Please list any medications you are currently taking for this condition.: Busparone  Please describe any probable cause for these symptoms. : Read above.  What is the primary reason for your visit?: Other    Subjective   Sandra Gonzalez is a 76 y.o. female.     Chief Complaint   Patient presents with   • Anxiety     Patient was under alot of stress around [place] time.          Current Outpatient Medications:   •  amLODIPine (NORVASC) 5 MG tablet, Take 1 tablet by mouth Daily., Disp: 90 tablet, Rfl: 1  •  Biotin 10 MG tablet, Take 1 tablet by mouth Daily., Disp: , Rfl:   •  busPIRone (BUSPAR) 5 MG tablet, Take 1 tablet by mouth 3 (Three) Times a Day As Needed (anxiety)., Disp: 45 tablet, Rfl: 0  •  cetirizine (zyrTEC) 10 MG tablet, Take 10 mg by mouth Daily., Disp: , Rfl:   •  hydrOXYzine (ATARAX) 25 MG tablet, Take 1 tablet by mouth Daily As Needed for Itching., Disp: 40 tablet, Rfl: 0  •  escitalopram (Lexapro) 5 MG tablet, Take 1 tablet by mouth Every Night., Disp: 30 tablet, Rfl: 1    Past Medical History:   Diagnosis Date   • Achilles tendinitis    • Allergic    • Bladder Cancer     2016/ 2019 Bladder Tumor Sx----------Dr. Saldana    • DDD, lumbar    • DEXA     2013 = (0.8/ -0.1)/ 2020=  (-1.2/ -1.7)   • HL (hearing loss)     Wear hearing aids   • Hyperlipidemia    • Hypertension    • Lower back pain    • MAMMO     NEG = 2019/ 2020/ 2021   • Mitral valve prolapse    • Parathyroid adenoma     s/p Sx   • Pulmonary nodule    • Renal Cell Cancer    • Rheumatic fever    • Scoliosis    • Vitamin D deficiency        Past Surgical History:   Procedure Laterality Date   • ADENOIDECTOMY     • APPENDECTOMY     • BLADDER TUMOR EXCISION  05/2019    x 2   • CHOLECYSTECTOMY     • COLONOSCOPY      2018= NEG, rech 2028?         GSI   • NEPHRECTOMY  09/2009    left nephrectomy   • PARATHYROID GLAND SURGERY  12/2020   • TONSILLECTOMY     • TOTAL ABDOMINAL HYSTERECTOMY WITH SALPINGO OOPHORECTOMY         Family History   Problem Relation Age of Onset   • Cancer Mother         She had gallbladder cancer, liver   • Cancer Father 77        Brain tumor - cancer   • No Known Problems Sister    • No Known Problems Brother        Social History     Socioeconomic History   • Marital status:    Tobacco Use   • Smoking status: Never Smoker   • Smokeless tobacco: Never Used   Vaping Use   • Vaping Use: Never used   Substance and Sexual Activity   • Alcohol use: No   • Drug use: No   • Sexual activity: Yes     Partners: Male     Birth control/protection: None     Comment: I am 76 years old not worried about getting pregnant.       75y/o C female here to disc anx/ stress    Pt states her 's memory has worsened in the last 1-2 mos and they have been going to a lot of Dr england for eval; Pt states she has been stressed out a ton w/ this----pt states her mother in law had CVA/ Dementia as well and she states her sister in law also seems to be having some issues w/ her memory    Pt states she feels she needs something to help her adjust to her 's new issues-----he just had a MRI of his brain and waiting on these results         The following portions of the patient's history were reviewed and updated as appropriate:  allergies, current medications, past family history, past medical history, past social history, past surgical history and problem list.    Review of Systems   Constitutional: Positive for fatigue. Negative for activity change, appetite change, unexpected weight gain and unexpected weight loss.   Skin: Negative for rash and bruise.   Psychiatric/Behavioral: Positive for stress. Negative for sleep disturbance. The patient is nervous/anxious.        Vitals:    01/06/22 1304   BP: 126/76   Pulse: 92   Resp: 14   Temp: 97.1 °F (36.2 °C)   SpO2: 100%       Objective   Physical Exam  Vitals and nursing note reviewed.   Constitutional:       General: She is not in acute distress.     Appearance: She is well-developed. She is not ill-appearing, toxic-appearing or diaphoretic.   HENT:      Head: Normocephalic and atraumatic.   Cardiovascular:      Rate and Rhythm: Normal rate and regular rhythm.      Heart sounds: Normal heart sounds. No murmur heard.      Pulmonary:      Effort: Pulmonary effort is normal. No respiratory distress.      Breath sounds: Normal breath sounds. No stridor. No wheezing, rhonchi or rales.   Skin:     General: Skin is warm and dry.      Findings: No rash.   Neurological:      Mental Status: She is alert and oriented to person, place, and time.      Cranial Nerves: No cranial nerve deficit.   Psychiatric:         Attention and Perception: Attention and perception normal.         Mood and Affect: Mood and affect normal.         Speech: Speech normal.         Behavior: Behavior normal. Behavior is cooperative.         Thought Content: Thought content normal.         Cognition and Memory: Cognition and memory normal.         Judgment: Judgment normal.           Assessment/Plan   Diagnoses and all orders for this visit:    1. Anxiety (Primary)    2. Stress at home    3. Primary hypertension    Other orders  -     escitalopram (Lexapro) 5 MG tablet; Take 1 tablet by mouth Every Night.  Dispense: 30 tablet;  Refill: 1  -     amLODIPine (NORVASC) 5 MG tablet; Take 1 tablet by mouth Daily.  Dispense: 90 tablet; Refill: 1      Pt to call if not tolerating new med or wanting to increase it at 1mo

## 2022-01-12 ENCOUNTER — CLINICAL SUPPORT (OUTPATIENT)
Dept: FAMILY MEDICINE CLINIC | Facility: CLINIC | Age: 77
End: 2022-01-12

## 2022-01-12 DIAGNOSIS — E78.2 MIXED HYPERLIPIDEMIA: ICD-10-CM

## 2022-01-12 PROCEDURE — 36415 COLL VENOUS BLD VENIPUNCTURE: CPT | Performed by: FAMILY MEDICINE

## 2022-01-12 PROCEDURE — 80061 LIPID PANEL: CPT | Performed by: FAMILY MEDICINE

## 2022-01-13 ENCOUNTER — TELEPHONE (OUTPATIENT)
Dept: FAMILY MEDICINE CLINIC | Facility: CLINIC | Age: 77
End: 2022-01-13

## 2022-01-13 LAB
CHOLEST SERPL-MCNC: 228 MG/DL (ref 0–200)
HDLC SERPL-MCNC: 66 MG/DL (ref 40–60)
LDLC SERPL CALC-MCNC: 146 MG/DL (ref 0–100)
LDLC/HDLC SERPL: 2.17 {RATIO}
TRIGL SERPL-MCNC: 94 MG/DL (ref 0–150)
VLDLC SERPL-MCNC: 16 MG/DL (ref 5–40)

## 2022-01-13 NOTE — TELEPHONE ENCOUNTER
BONNIE to read  HUB to ask question   My chart message was sent to the patient.    Cholesterol panel increased -----would she consider low dose prescription medication? Or over the counter red yeast rice?

## 2022-01-25 ENCOUNTER — APPOINTMENT (OUTPATIENT)
Dept: LAB | Facility: HOSPITAL | Age: 77
End: 2022-01-25

## 2022-01-25 ENCOUNTER — OFFICE VISIT (OUTPATIENT)
Dept: ONCOLOGY | Facility: CLINIC | Age: 77
End: 2022-01-25

## 2022-01-25 VITALS
HEART RATE: 74 BPM | DIASTOLIC BLOOD PRESSURE: 80 MMHG | HEIGHT: 64 IN | RESPIRATION RATE: 18 BRPM | WEIGHT: 169 LBS | SYSTOLIC BLOOD PRESSURE: 154 MMHG | TEMPERATURE: 97.4 F | BODY MASS INDEX: 28.85 KG/M2

## 2022-01-25 DIAGNOSIS — R91.8 ABNORMAL CHEST X-RAY WITH MULTIPLE LUNG NODULES: ICD-10-CM

## 2022-01-25 DIAGNOSIS — R79.89 HIGH SERUM FERRITIN: ICD-10-CM

## 2022-01-25 DIAGNOSIS — E83.19 IRON OVERLOAD SYNDROME: Primary | ICD-10-CM

## 2022-01-25 LAB
ALBUMIN SERPL-MCNC: 4.3 G/DL (ref 3.5–5.2)
ALBUMIN/GLOB SERPL: 1.7 G/DL
ALP SERPL-CCNC: 54 U/L (ref 39–117)
ALT SERPL W P-5'-P-CCNC: 15 U/L (ref 1–33)
ANION GAP SERPL CALCULATED.3IONS-SCNC: 10 MMOL/L (ref 5–15)
AST SERPL-CCNC: 17 U/L (ref 1–32)
BASOPHILS # BLD AUTO: 0.03 10*3/MM3 (ref 0–0.2)
BASOPHILS NFR BLD AUTO: 0.4 % (ref 0–1.5)
BILIRUB SERPL-MCNC: 0.3 MG/DL (ref 0–1.2)
BUN SERPL-MCNC: 19 MG/DL (ref 8–23)
BUN/CREAT SERPL: 21.6 (ref 7–25)
CALCIUM SPEC-SCNC: 9.3 MG/DL (ref 8.6–10.5)
CHLORIDE SERPL-SCNC: 104 MMOL/L (ref 98–107)
CO2 SERPL-SCNC: 27 MMOL/L (ref 22–29)
CREAT SERPL-MCNC: 0.88 MG/DL (ref 0.57–1)
DEPRECATED RDW RBC AUTO: 43.9 FL (ref 37–54)
EOSINOPHIL # BLD AUTO: 0.16 10*3/MM3 (ref 0–0.4)
EOSINOPHIL NFR BLD AUTO: 2.1 % (ref 0.3–6.2)
ERYTHROCYTE [DISTWIDTH] IN BLOOD BY AUTOMATED COUNT: 13.1 % (ref 12.3–15.4)
FERRITIN SERPL-MCNC: 203.1 NG/ML (ref 13–150)
GFR SERPL CREATININE-BSD FRML MDRD: 63 ML/MIN/1.73
GLOBULIN UR ELPH-MCNC: 2.6 GM/DL
GLUCOSE SERPL-MCNC: 93 MG/DL (ref 65–99)
HCT VFR BLD AUTO: 42.5 % (ref 34–46.6)
HGB BLD-MCNC: 13.6 G/DL (ref 12–15.9)
IRON 24H UR-MRATE: 91 MCG/DL (ref 37–145)
IRON SATN MFR SERPL: 25 % (ref 20–50)
LYMPHOCYTES # BLD AUTO: 2.39 10*3/MM3 (ref 0.7–3.1)
LYMPHOCYTES NFR BLD AUTO: 31.8 % (ref 19.6–45.3)
MCH RBC QN AUTO: 30.4 PG (ref 26.6–33)
MCHC RBC AUTO-ENTMCNC: 32 G/DL (ref 31.5–35.7)
MCV RBC AUTO: 94.9 FL (ref 79–97)
MONOCYTES # BLD AUTO: 0.61 10*3/MM3 (ref 0.1–0.9)
MONOCYTES NFR BLD AUTO: 8.1 % (ref 5–12)
NEUTROPHILS NFR BLD AUTO: 4.32 10*3/MM3 (ref 1.7–7)
NEUTROPHILS NFR BLD AUTO: 57.6 % (ref 42.7–76)
PLATELET # BLD AUTO: 206 10*3/MM3 (ref 140–450)
PMV BLD AUTO: 10.3 FL (ref 6–12)
POTASSIUM SERPL-SCNC: 4.9 MMOL/L (ref 3.5–5.2)
PROT SERPL-MCNC: 6.9 G/DL (ref 6–8.5)
RBC # BLD AUTO: 4.48 10*6/MM3 (ref 3.77–5.28)
SODIUM SERPL-SCNC: 141 MMOL/L (ref 136–145)
TIBC SERPL-MCNC: 361 MCG/DL (ref 298–536)
TRANSFERRIN SERPL-MCNC: 242 MG/DL (ref 200–360)
WBC NRBC COR # BLD: 7.51 10*3/MM3 (ref 3.4–10.8)

## 2022-01-25 PROCEDURE — 99213 OFFICE O/P EST LOW 20 MIN: CPT | Performed by: INTERNAL MEDICINE

## 2022-01-25 PROCEDURE — 80053 COMPREHEN METABOLIC PANEL: CPT | Performed by: INTERNAL MEDICINE

## 2022-01-25 PROCEDURE — 82728 ASSAY OF FERRITIN: CPT | Performed by: INTERNAL MEDICINE

## 2022-01-25 PROCEDURE — 36415 COLL VENOUS BLD VENIPUNCTURE: CPT | Performed by: INTERNAL MEDICINE

## 2022-01-25 PROCEDURE — 85025 COMPLETE CBC W/AUTO DIFF WBC: CPT | Performed by: INTERNAL MEDICINE

## 2022-01-25 PROCEDURE — 84466 ASSAY OF TRANSFERRIN: CPT | Performed by: INTERNAL MEDICINE

## 2022-01-25 PROCEDURE — 83540 ASSAY OF IRON: CPT | Performed by: INTERNAL MEDICINE

## 2022-01-25 NOTE — PROGRESS NOTES
Hematology/Oncology Outpatient Follow Up    PATIENT NAME:Sandra Gonzalez  :1945  MRN: [id number]  PRIMARY CARE PHYSICIAN: Natalie Garcia DO  REFERRING PHYSICIAN: Natalie Garcia DO    Chief Complaint   Patient presents with   • Appointment     Anemia, unspecified type        HISTORY OF PRESENT ILLNESS:     This is a 74-year-old female who is here today due to elevated calcium level in her blood.  Patient states that she has been on oral calcium supplementation along with vitamin D.  On a routine biochemical testing she was found to have elevated calcium to 11.  An ionized calcium level was noted to be 1.5.  Patient denies any night sweats or fatigue symptoms.  She has been on weight watchers diet and has lost approximately more than 20 pounds intentionally.  She has occasional right hip pain right joint aching pain.  Overall she feels well otherwise.  Also on her routine lab test was found to have an elevated ferritin to 279 on 2020.  She has no history of iron overload issues.  Her serum iron and  serum iron saturation were normal.  There is no family history of liver cancer, cirrhosis of the liver.     We have her labs from   Which showed her white count was 6.2, hemoglobin 13.3 and platelets were 236, her differentials where 57% neutrophils, 34% lymphocytes, there was no monocytosis, eosinophilia or basophilia.  BUN was 17, creatinine 0.93 and serum calcium was 11.  She has normal total protein and albumin levels.  Liver function tests were also unremarkable.  Serum iron was normal at 86, iron saturation was normal at 28.  TSH was normal and vitamin D1 was also normal at 43.1.  Sed rate was 6.     Patient also has a history of bladder cancer, kidney cancer and cervical cancer.  · 2020 patient had a CT scan of the chest, abdomen and pelvis, this revealed nonspecific 4 to 5 mm pulmonary nodules in the lower lobes follow-up CT of the chest in 3 months has been recommended in the  abdomen there where probable hepatic cyst but no evidence of masses in the abdomen.  · 2/17/2020 patient had a parathyroid hormone level which was elevated at 121, sed rate was normal at 10, C-reactive protein was normal at 0.06 IgA was normal at 157, IgG was normal at 935 and IgM normal at 70 her white count was 7.7, hemoglobin 13.9 and platelets were 224 with unremarkable differential.  Patient had HFE analysis which did not show any mutations.  SPEP with ALISA did not show any evidence of monoclonal protein.  She has normal free light chains.  · 3/9/2020-patient was referred to Dr. Burnham for elevated parathyroid hormone.  Further work-up has been recommended including 24-hour urine calcium evaluation, and DEXA scan patient also will be referred to surgeon by Dr. Burnham  · 6/2/2020 patient had iron studies which showed a normal serum iron 95, iron saturation was normal at 25%.  TIBC is normal at 337.  Ferritin is 195  · 6/2/2020-patient had a serum calcium level which was normal at 10  · 6/9/2020-patient had CT scan of the chest which showed stable pulmonary nodules.  Follow-up in 6 months has been recommended  · 1/25/2022: Patient here for follow up visit.  Ferritin 203.10, iron sat 25, iron 91, transferrin 242, TIBC 361. Hemoglobin 13.6, HCT 42.5, MCV 94.9, MCH 30.4    History of present illness was reviewed and is unchanged from the previous visit. 01/25/22      Past Medical History:   Diagnosis Date   • Achilles tendinitis    • Allergic    • Bladder Cancer     2016/ 2019 Bladder Tumor Sx----------Dr. Saldana    • DDD, lumbar    • DEXA     2013 = (0.8/ -0.1)/ 2020= (-1.2/ -1.7)   • HL (hearing loss)     Wear hearing aids   • Hyperlipidemia    • Hypertension    • Lower back pain    • MAMMO     NEG = 2019/ 2020/ 2021   • Mitral valve prolapse    • Parathyroid adenoma     s/p Sx   • Pulmonary nodule    • Renal Cell Cancer    • Rheumatic fever    • Scoliosis    • Vitamin D deficiency        Past Surgical  History:   Procedure Laterality Date   • ADENOIDECTOMY     • APPENDECTOMY     • BLADDER TUMOR EXCISION  05/2019    x 2   • CHOLECYSTECTOMY     • COLONOSCOPY      2018= NEG, rech 2028?         GSI   • NEPHRECTOMY  09/2009    left nephrectomy   • PARATHYROID GLAND SURGERY  12/2020   • TONSILLECTOMY     • TOTAL ABDOMINAL HYSTERECTOMY WITH SALPINGO OOPHORECTOMY           Current Outpatient Medications:   •  amLODIPine (NORVASC) 5 MG tablet, Take 1 tablet by mouth Daily., Disp: 90 tablet, Rfl: 1  •  Biotin 10 MG tablet, Take 1 tablet by mouth Daily., Disp: , Rfl:   •  cetirizine (zyrTEC) 10 MG tablet, Take 10 mg by mouth Daily., Disp: , Rfl:   •  escitalopram (Lexapro) 5 MG tablet, Take 1 tablet by mouth Every Night., Disp: 30 tablet, Rfl: 1  •  hydrOXYzine (ATARAX) 25 MG tablet, Take 1 tablet by mouth Daily As Needed for Itching., Disp: 40 tablet, Rfl: 0    Allergies   Allergen Reactions   • B.F.I. Rash     [place]   • Benzocaine Rash   • Amoxicillin-Pot Clavulanate Hives   • Oxycodone Nausea And Vomiting       Family History   Problem Relation Age of Onset   • Cancer Mother         She had gallbladder cancer, liver   • Cancer Father 77        Brain tumor - cancer   • No Known Problems Sister    • No Known Problems Brother        Cancer-related family history includes Cancer in her mother; Cancer (age of onset: 77) in her father.    Social History     Tobacco Use   • Smoking status: Never Smoker   • Smokeless tobacco: Never Used   Vaping Use   • Vaping Use: Never used   Substance Use Topics   • Alcohol use: No   • Drug use: No       I have reviewed and confirmed the accuracy of the patient's history: Chief complaint, HPI and ROS as entered by the MA/LPN/RN. Torri Barnett, APRN 01/25/22     SUBJECTIVE:    The patient is here for a follow up appointment . States that she is taking Zyrtec daily for eczema which has controlled her rash.  No new physical issues.         REVIEW OF SYSTEMS:  Review of Systems  "  Constitutional: Negative for chills and fever.   HENT: Negative for ear pain, mouth sores, nosebleeds and sore throat.    Eyes: Negative for photophobia and visual disturbance.   Respiratory: Negative for wheezing and stridor.    Cardiovascular: Negative for chest pain and palpitations.   Gastrointestinal: Negative for abdominal pain, diarrhea, nausea and vomiting.   Endocrine: Negative for cold intolerance and heat intolerance.   Genitourinary: Negative for dysuria and hematuria.   Musculoskeletal: Negative for joint swelling and neck stiffness.   Skin: Negative for color change and rash.   Neurological: Negative for seizures and syncope.   Hematological: Negative for adenopathy.        No obvious bleeding   Psychiatric/Behavioral: Negative for agitation, confusion and hallucinations.     ROS was reviewed and is updated from the previous visit.1/25/2022     OBJECTIVE:    Vitals:    01/25/22 1109   BP: 154/80   Pulse: 74   Resp: 18   Temp: 97.4 °F (36.3 °C)   Weight: 76.7 kg (169 lb)   Height: 162.6 cm (64\")   PainSc: 0-No pain     Body mass index is 29.01 kg/m².    ECOG  (1) Restricted in physically strenuous activity, ambulatory and able to do work of light nature    Physical Exam   Constitutional: She is oriented to person, place, and time. No distress.   HENT:   Head: Normocephalic and atraumatic.   Right Ear: Tympanic membrane, external ear and ear canal normal.   Left Ear: Tympanic membrane, external ear and ear canal normal.   Nose: Nose normal. No rhinorrhea or congestion.   Mouth/Throat: Mucous membranes are moist. No oropharyngeal exudate. Oropharynx is clear.   Eyes: Pupils are equal, round, and reactive to light. Conjunctivae are normal. Right eye exhibits no discharge. Left eye exhibits no discharge. No scleral icterus.   Neck: No thyromegaly present.   Cardiovascular: Normal rate, regular rhythm and normal heart sounds. Exam reveals no gallop and no friction rub.   Pulmonary/Chest: Effort normal and " breath sounds normal. No stridor. No respiratory distress. She has no wheezes.   Abdominal: Soft. Bowel sounds are normal. She exhibits no mass. There is no abdominal tenderness. There is no rebound and no guarding.   Genitourinary:    Genitourinary Comments: deferred     Musculoskeletal: Normal range of motion. No tenderness.      Right lower leg: No edema.      Left lower leg: No edema.   Lymphadenopathy:     She has no cervical adenopathy.   Neurological: She is alert and oriented to person, place, and time. She exhibits normal muscle tone.   Skin: Skin is warm. Capillary refill takes 2 to 3 seconds. No lesion and no rash noted. She is not diaphoretic. No erythema.   Psychiatric: Her behavior is normal. Mood, judgment and thought content normal.   Nursing note and vitals reviewed.    Physical examination was reviewed and is updated or unchanged from the previous visit. 1/25/2022    I have reexamined the patient and the results are consistent with the previously documented exam. Torri Barnett, TIFFANY   RECENT LABS    WBC   Date Value Ref Range Status   07/27/2021 6.52 3.40 - 10.80 10*3/mm3 Final   12/03/2020 6.40 4.5 - 11.0 10*3/uL Final     RBC   Date Value Ref Range Status   07/27/2021 4.45 3.77 - 5.28 10*6/mm3 Final   12/03/2020 4.37 4.0 - 5.2 10*6/uL Final     Hemoglobin   Date Value Ref Range Status   07/27/2021 13.5 12.0 - 15.9 g/dL Final   12/03/2020 13.5 12.0 - 16.0 g/dL Final     Hematocrit   Date Value Ref Range Status   07/27/2021 42.3 34.0 - 46.6 % Final   12/03/2020 41.6 36.0 - 46.0 % Final     MCV   Date Value Ref Range Status   07/27/2021 95.1 79.0 - 97.0 fL Final   12/03/2020 95.2 80.0 - 100.0 fL Final     MCH   Date Value Ref Range Status   07/27/2021 30.3 26.6 - 33.0 pg Final   12/03/2020 30.9 26.0 - 34.0 pg Final     MCHC   Date Value Ref Range Status   07/27/2021 31.9 31.5 - 35.7 g/dL Final   12/03/2020 32.5 31.0 - 37.0 g/dL Final     RDW   Date Value Ref Range Status   07/27/2021 13.0 12.3 - 15.4  % Final   12/03/2020 13.0 12.0 - 16.8 % Final     RDW-SD   Date Value Ref Range Status   07/27/2021 43.8 37.0 - 54.0 fl Final     MPV   Date Value Ref Range Status   07/27/2021 10.2 6.0 - 12.0 fL Final   12/03/2020 10.9 8.4 - 12.4 fL Final     Platelets   Date Value Ref Range Status   07/27/2021 219 140 - 450 10*3/mm3 Final   12/03/2020 208 140 - 440 10*3/uL Final     Neutrophil Rel %   Date Value Ref Range Status   12/03/2020 58.4 45 - 80 % Final     Neutrophil %   Date Value Ref Range Status   07/27/2021 54.6 42.7 - 76.0 % Final     Lymphocyte Rel %   Date Value Ref Range Status   12/03/2020 31.7 15 - 50 % Final     Lymphocyte %   Date Value Ref Range Status   07/27/2021 36.5 19.6 - 45.3 % Final     Monocyte Rel %   Date Value Ref Range Status   12/03/2020 6.9 0 - 15 % Final     Monocyte %   Date Value Ref Range Status   07/27/2021 6.9 5.0 - 12.0 % Final     Eosinophil %   Date Value Ref Range Status   07/27/2021 1.7 0.3 - 6.2 % Final   12/03/2020 2.2 0 - 7 % Final     Basophil Rel %   Date Value Ref Range Status   12/03/2020 0.5 0 - 2 % Final     Basophil %   Date Value Ref Range Status   07/27/2021 0.3 0.0 - 1.5 % Final     Immature Grans %   Date Value Ref Range Status   12/03/2020 0.3 0.0 - 1.0 % Final     Neutrophils Absolute   Date Value Ref Range Status   12/03/2020 3.74 2.0 - 8.8 10*3/uL Final     Neutrophils, Absolute   Date Value Ref Range Status   07/27/2021 3.56 1.70 - 7.00 10*3/mm3 Final     Lymphocytes Absolute   Date Value Ref Range Status   12/03/2020 2.03 0.7 - 5.5 10*3/uL Final     Lymphocytes, Absolute   Date Value Ref Range Status   07/27/2021 2.38 0.70 - 3.10 10*3/mm3 Final     Monocytes Absolute   Date Value Ref Range Status   12/03/2020 0.44 0.0 - 1.7 10*3/uL Final     Monocytes, Absolute   Date Value Ref Range Status   07/27/2021 0.45 0.10 - 0.90 10*3/mm3 Final     Eosinophils Absolute   Date Value Ref Range Status   12/03/2020 0.14 0.0 - 0.8 10*3/uL Final     Eosinophils, Absolute   Date  Value Ref Range Status   07/27/2021 0.11 0.00 - 0.40 10*3/mm3 Final     Basophils Absolute   Date Value Ref Range Status   12/03/2020 0.03 0.0 - 0.2 10*3/uL Final     Basophils, Absolute   Date Value Ref Range Status   07/27/2021 0.02 0.00 - 0.20 10*3/mm3 Final     Immature Grans, Absolute   Date Value Ref Range Status   12/03/2020 0.02 0.00 - 0.10 10*3/uL Final     nRBC   Date Value Ref Range Status   12/03/2020 0 0 /100(WBC) Final       Lab Results   Component Value Date    GLUCOSE 87 07/27/2021    BUN 18 07/27/2021    CREATININE 0.95 07/27/2021    EGFRIFNONA 58 (L) 07/27/2021    BCR 18.9 07/27/2021    K 4.5 07/27/2021    CO2 26.0 07/27/2021    CALCIUM 8.8 07/27/2021    PROTENTOTREF 6.7 02/17/2020    ALBUMIN 4.10 07/27/2021    LABIL2 1.4 02/17/2020    AST 16 07/27/2021    ALT 12 07/27/2021         ASSESSMENT:    1. Hypercalcemia due to hyperparathyroidism: Followed by endocrinology status post parathyroidectomy  2. Elevated parathyroid hormone level: Status post para thyroidectomy fall 2020  3. Pulmonary nodules 4 to 5 mm. Ongoing management stable CT scan follow-up in 6 months recommended: Reviewed: Patient is now due for a follow-up CT of the chest.  Will order today  4. Elevated ferritin level which may be reactive versus iron overload syndrome.  Patient has normal serum iron, and iron saturation.  HFE analysis with no evidence of mutation.  Ferritin level 203.10 which is slightly elevated. Iron levels normal.   5. Personal history of multiple malignancies including kidney cancer, bladder cancer and cervical cancer.  Formation given on cancer genetics today  6. Urticaria: Followed by dermatology and receiving steroidal injection- resolved- taking Zyrtec daily which is effective  7. New auditory hallucination- resolved: MRI negative    PLANS:  1. CT chest without contrast- due 2/2022 but patient would like to wait additional 6 months so her PCP can schedule the abdomen and pelvis along with the CT chest  2.  CBC/diff, CMP, iron profile and ferritin reviewed with patient  3. Continue follow up with Endocrinology, pulmonary, and PCP provider  4. Last CT Pulmonary nodules remain stable: Follow-up CT of the chest in 6 months has been recommended: Consult entered for end of July 2022 per patient's request  5. MRI brain negative  6. Follow up with  in 6 months or sooner if problem arises  7. Obtain ferritin, iron profile, CBC/diff and CMP in 6 months - one week prior to seeing   8.  Recommend genetic testing for high risk evaluation due to history of 3 primary cancers including kidney, bladder and cervical cancer.  9. All questions answered, patient verbalized understanding and is agreeable to above plan  10 I have reviewed labs results, imaging, vitals, and medications with the patient today.   11. I spent 30 total minutes, face-to-face, caring for Sandra today.  90% of this time involved counseling and/or coordination of care as documented within this note.    Electronically signed by TIFFANY Fisher, 01/25/22, 3:38 PM MYKEL.

## 2022-03-04 RX ORDER — ESCITALOPRAM OXALATE 5 MG/1
5 TABLET ORAL NIGHTLY
Qty: 30 TABLET | Refills: 1 | Status: CANCELLED | OUTPATIENT
Start: 2022-03-04

## 2022-03-04 RX ORDER — ESCITALOPRAM OXALATE 5 MG/1
5 TABLET ORAL NIGHTLY
Qty: 90 TABLET | Refills: 1 | Status: SHIPPED | OUTPATIENT
Start: 2022-03-04 | End: 2022-07-11

## 2022-03-04 NOTE — TELEPHONE ENCOUNTER
Janet req for refill.  Verified w/pt that she is asking for Lexapro.  Pt just started med in jan w/no refills, stated it is working well - asking for 90 day refill.    Per Janet from pt:  The med I’m taking for anxiety seem to work fine but prescription is running out could I get a 60 or 90 day refill at [facility]      Rx Refill Note  Requested Prescriptions     Pending Prescriptions Disp Refills   • escitalopram (Lexapro) 5 MG tablet 30 tablet 1     Sig: Take 1 tablet by mouth Every Night.      Last office visit with prescribing clinician: 1/6/2022      Next office visit with prescribing clinician: Visit date not found     Office Visit with Natalie Garcia DO (01/06/2022)  Iron Profile (01/25/2022 12:13)  Ferritin (01/25/2022 12:13)  Comprehensive Metabolic Panel (01/25/2022 12:13)  CBC & Differential (01/25/2022 12:13)  Lipid Panel (01/12/2022 08:20)         Han Venegas  03/04/22, 08:26 EST

## 2022-03-24 ENCOUNTER — HOSPITAL ENCOUNTER (OUTPATIENT)
Dept: CARDIOLOGY | Facility: HOSPITAL | Age: 77
Discharge: HOME | End: 2022-03-24
Admitting: INTERNAL MEDICINE

## 2022-03-24 ENCOUNTER — OFFICE VISIT (OUTPATIENT)
Dept: CARDIOLOGY | Facility: CLINIC | Age: 77
End: 2022-03-24

## 2022-03-24 VITALS
OXYGEN SATURATION: 97 % | SYSTOLIC BLOOD PRESSURE: 126 MMHG | HEART RATE: 69 BPM | WEIGHT: 171 LBS | DIASTOLIC BLOOD PRESSURE: 75 MMHG | BODY MASS INDEX: 29.19 KG/M2 | HEIGHT: 64 IN

## 2022-03-24 VITALS — WEIGHT: 169 LBS | BODY MASS INDEX: 28.85 KG/M2 | HEIGHT: 64 IN

## 2022-03-24 DIAGNOSIS — I10 ESSENTIAL HYPERTENSION: Primary | ICD-10-CM

## 2022-03-24 DIAGNOSIS — I34.0 NONRHEUMATIC MITRAL VALVE REGURGITATION: ICD-10-CM

## 2022-03-24 DIAGNOSIS — E78.5 DYSLIPIDEMIA: ICD-10-CM

## 2022-03-24 DIAGNOSIS — I10 ESSENTIAL HYPERTENSION: ICD-10-CM

## 2022-03-24 LAB
BH CV ECHO MEAS - AO MAX PG: 6.1 MMHG
BH CV ECHO MEAS - AO MEAN PG: 3.4 MMHG
BH CV ECHO MEAS - AO ROOT DIAM: 2.7 CM
BH CV ECHO MEAS - AO V2 MAX: 123.9 CM/SEC
BH CV ECHO MEAS - AO V2 VTI: 26.5 CM
BH CV ECHO MEAS - AVA(I,D): 3.5 CM2
BH CV ECHO MEAS - EDV(CUBED): 54.2 ML
BH CV ECHO MEAS - EDV(MOD-SP4): 34.2 ML
BH CV ECHO MEAS - EF(MOD-SP4): 55.7 %
BH CV ECHO MEAS - ESV(CUBED): 6.9 ML
BH CV ECHO MEAS - ESV(MOD-SP4): 15.1 ML
BH CV ECHO MEAS - FS: 49.7 %
BH CV ECHO MEAS - IVS/LVPW: 0.84 CM
BH CV ECHO MEAS - IVSD: 0.7 CM
BH CV ECHO MEAS - LA DIMENSION: 4.2 CM
BH CV ECHO MEAS - LV DIASTOLIC VOL/BSA (35-75): 18.8 CM2
BH CV ECHO MEAS - LV MASS(C)D: 80.9 GRAMS
BH CV ECHO MEAS - LV MAX PG: 5.1 MMHG
BH CV ECHO MEAS - LV MEAN PG: 2.9 MMHG
BH CV ECHO MEAS - LV SYSTOLIC VOL/BSA (12-30): 8.3 CM2
BH CV ECHO MEAS - LV V1 MAX: 112.5 CM/SEC
BH CV ECHO MEAS - LV V1 VTI: 25.6 CM
BH CV ECHO MEAS - LVIDD: 3.8 CM
BH CV ECHO MEAS - LVIDS: 1.9 CM
BH CV ECHO MEAS - LVOT AREA: 3.6 CM2
BH CV ECHO MEAS - LVOT DIAM: 2.14 CM
BH CV ECHO MEAS - LVPWD: 0.84 CM
BH CV ECHO MEAS - MV A MAX VEL: 119.8 CM/SEC
BH CV ECHO MEAS - MV DEC SLOPE: 570.1 CM/SEC2
BH CV ECHO MEAS - MV DEC TIME: 0.16 MSEC
BH CV ECHO MEAS - MV E MAX VEL: 90.1 CM/SEC
BH CV ECHO MEAS - MV E/A: 0.75
BH CV ECHO MEAS - MV MAX PG: 7.8 MMHG
BH CV ECHO MEAS - MV MEAN PG: 2.44 MMHG
BH CV ECHO MEAS - MV V2 VTI: 23.9 CM
BH CV ECHO MEAS - MVA(VTI): 3.8 CM2
BH CV ECHO MEAS - PA ACC TIME: 0.17 SEC
BH CV ECHO MEAS - PA PR(ACCEL): 4.3 MMHG
BH CV ECHO MEAS - RAP SYSTOLE: 3 MMHG
BH CV ECHO MEAS - RV MAX PG: 1.84 MMHG
BH CV ECHO MEAS - RV V1 MAX: 67.8 CM/SEC
BH CV ECHO MEAS - RV V1 VTI: 12 CM
BH CV ECHO MEAS - RVDD: 3 CM
BH CV ECHO MEAS - RVSP: 24.7 MMHG
BH CV ECHO MEAS - SI(MOD-SP4): 10.5 ML/M2
BH CV ECHO MEAS - SV(LVOT): 92 ML
BH CV ECHO MEAS - SV(MOD-SP4): 19 ML
BH CV ECHO MEAS - TR MAX PG: 21.7 MMHG
BH CV ECHO MEAS - TR MAX VEL: 233.1 CM/SEC
MAXIMAL PREDICTED HEART RATE: 146 BPM
STRESS TARGET HR: 124 BPM

## 2022-03-24 PROCEDURE — 93306 TTE W/DOPPLER COMPLETE: CPT

## 2022-03-24 PROCEDURE — 93306 TTE W/DOPPLER COMPLETE: CPT | Performed by: INTERNAL MEDICINE

## 2022-03-24 PROCEDURE — 99213 OFFICE O/P EST LOW 20 MIN: CPT | Performed by: INTERNAL MEDICINE

## 2022-03-24 RX ORDER — AMLODIPINE BESYLATE 5 MG/1
5 TABLET ORAL DAILY
Qty: 90 TABLET | Refills: 3 | Status: SHIPPED | OUTPATIENT
Start: 2022-03-24 | End: 2023-04-01

## 2022-03-24 NOTE — PROGRESS NOTES
"    Subjective:     Encounter Date:03/24/2022      Patient ID: Sandra Gonzalez is a 76 y.o. female.    Chief Complaint:  History of Present Illness 76-year-old white female with history of valvular heart disease history of hypertension hyperlipidemia presents to my office for follow-up.  Patient is currently stable without any signs of chest pain or shortness of breath at rest on exertion.  No complains any PND orthopnea.  No palpitation dizziness syncope or swelling of the feet.  Patient has been taking all the medicines regularly.  Patient does not smoke.  Patient is trying to exercise.  Patient follows a good diet.    The following portions of the patient's history were reviewed and updated as appropriate: allergies, current medications, past family history, past medical history, past social history, past surgical history and problem list.  Past Medical History:   Diagnosis Date   • Achilles tendinitis    • Allergic    • Bladder Cancer     2016/ 2019 Bladder Tumor Sx----------Dr. Saldana    • DDD, lumbar    • DEXA     2013 = (0.8/ -0.1)/ 2020= (-1.2/ -1.7)   • HL (hearing loss)     Wear hearing aids   • Hyperlipidemia    • Hypertension    • Lower back pain    • MAMMO     NEG = 2019/ 2020/ 2021   • Mitral valve prolapse    • Parathyroid adenoma     s/p Sx   • Pulmonary nodule    • Renal Cell Cancer    • Rheumatic fever    • Scoliosis    • Vitamin D deficiency      Past Surgical History:   Procedure Laterality Date   • ADENOIDECTOMY     • APPENDECTOMY     • BLADDER TUMOR EXCISION  05/2019    x 2   • CHOLECYSTECTOMY     • COLONOSCOPY      2018= NEG, rech 2028?         GSI   • NEPHRECTOMY  09/2009    left nephrectomy   • PARATHYROID GLAND SURGERY  12/2020   • TONSILLECTOMY     • TOTAL ABDOMINAL HYSTERECTOMY WITH SALPINGO OOPHORECTOMY       /75 (BP Location: Left arm)   Pulse 69   Ht 162.6 cm (64\")   Wt 77.6 kg (171 lb)   LMP 03/10/1990 (LMP Unknown)   SpO2 97%   BMI 29.35 kg/m²   Family History   Problem " Relation Age of Onset   • Cancer Mother         She had gallbladder cancer, liver   • Cancer Father 77        Brain tumor - cancer   • No Known Problems Sister    • No Known Problems Brother        Current Outpatient Medications:   •  amLODIPine (NORVASC) 5 MG tablet, Take 1 tablet by mouth Daily., Disp: 90 tablet, Rfl: 3  •  Biotin 10 MG tablet, Take 1 tablet by mouth Daily., Disp: , Rfl:   •  cetirizine (zyrTEC) 10 MG tablet, Take 10 mg by mouth Daily., Disp: , Rfl:   •  escitalopram (Lexapro) 5 MG tablet, Take 1 tablet by mouth Every Night., Disp: 90 tablet, Rfl: 1  •  hydrOXYzine (ATARAX) 25 MG tablet, Take 1 tablet by mouth Daily As Needed for Itching., Disp: 40 tablet, Rfl: 0  Allergies   Allergen Reactions   • B.F.I. Rash     [employer] [place]   • Benzocaine Rash   • Amoxicillin-Pot Clavulanate Hives   • Oxycodone Nausea And Vomiting     Social History     Socioeconomic History   • Marital status:    Tobacco Use   • Smoking status: Never Smoker   • Smokeless tobacco: Never Used   Vaping Use   • Vaping Use: Never used   Substance and Sexual Activity   • Alcohol use: No   • Drug use: No   • Sexual activity: Yes     Partners: Male     Birth control/protection: None     Comment: I am 76 years old not worried about getting pregnant.     Review of Systems   Constitutional: Negative for fever and malaise/fatigue.   Cardiovascular: Negative for chest pain, dyspnea on exertion and palpitations.   Respiratory: Negative for cough and shortness of breath.    Skin: Negative for rash.   Gastrointestinal: Negative for abdominal pain, nausea and vomiting.   Neurological: Negative for focal weakness and headaches.   All other systems reviewed and are negative.             Objective:     Constitutional:       Appearance: Well-developed.   Eyes:      General: No scleral icterus.     Conjunctiva/sclera: Conjunctivae normal.   HENT:      Head: Normocephalic and atraumatic.   Neck:      Vascular: No carotid bruit or JVD.    Pulmonary:      Effort: Pulmonary effort is normal.      Breath sounds: Normal breath sounds. No wheezing. No rales.   Cardiovascular:      Normal rate. Regular rhythm.      Murmurs: There is a systolic murmur.   Pulses:     Intact distal pulses.   Abdominal:      General: Bowel sounds are normal.      Palpations: Abdomen is soft.   Musculoskeletal:      Cervical back: Normal range of motion and neck supple. Skin:     General: Skin is warm and dry.      Findings: No rash.   Neurological:      Mental Status: Alert.       Procedures    Lab Review:         MDM  1.  Valvular heart disease  Patient has mild to moderate mitral regurgitation with normal B systolic function is currently stable on medications  2.  Hypertension excellent patient blood pressure currently stable amlodipine  3.  Hyperlipidemia  Patient does not tolerate statins very well and hence she is on over-the-counter medicines      Patient's previous medical records, labs, and EKG were reviewed and discussed with the patient at today's visit.

## 2022-07-05 ENCOUNTER — OFFICE VISIT (OUTPATIENT)
Dept: FAMILY MEDICINE CLINIC | Facility: CLINIC | Age: 77
End: 2022-07-05

## 2022-07-05 VITALS
OXYGEN SATURATION: 100 % | SYSTOLIC BLOOD PRESSURE: 130 MMHG | DIASTOLIC BLOOD PRESSURE: 76 MMHG | WEIGHT: 172 LBS | RESPIRATION RATE: 14 BRPM | HEIGHT: 64 IN | HEART RATE: 83 BPM | BODY MASS INDEX: 29.37 KG/M2 | TEMPERATURE: 97.7 F

## 2022-07-05 DIAGNOSIS — M79.641 PAIN OF RIGHT HAND: Primary | ICD-10-CM

## 2022-07-05 PROCEDURE — 99213 OFFICE O/P EST LOW 20 MIN: CPT | Performed by: FAMILY MEDICINE

## 2022-07-11 RX ORDER — ESCITALOPRAM OXALATE 5 MG/1
TABLET ORAL
Qty: 90 TABLET | Refills: 0 | Status: SHIPPED | OUTPATIENT
Start: 2022-07-11

## 2022-07-18 DIAGNOSIS — R79.89 HIGH SERUM FERRITIN: ICD-10-CM

## 2022-07-18 DIAGNOSIS — E83.19 IRON OVERLOAD SYNDROME: Primary | ICD-10-CM

## 2022-07-18 DIAGNOSIS — D64.9 ANEMIA, UNSPECIFIED TYPE: ICD-10-CM

## 2022-07-19 ENCOUNTER — LAB (OUTPATIENT)
Dept: LAB | Facility: HOSPITAL | Age: 77
End: 2022-07-19

## 2022-07-19 LAB
ALBUMIN SERPL-MCNC: 4.3 G/DL (ref 3.5–5.2)
ALBUMIN/GLOB SERPL: 1.6 G/DL
ALP SERPL-CCNC: 61 U/L (ref 39–117)
ALT SERPL W P-5'-P-CCNC: 12 U/L (ref 1–33)
ANION GAP SERPL CALCULATED.3IONS-SCNC: 10 MMOL/L (ref 5–15)
AST SERPL-CCNC: 18 U/L (ref 1–32)
BASOPHILS # BLD AUTO: 0.04 10*3/MM3 (ref 0–0.2)
BASOPHILS NFR BLD AUTO: 0.6 % (ref 0–1.5)
BILIRUB SERPL-MCNC: 0.3 MG/DL (ref 0–1.2)
BUN SERPL-MCNC: 23 MG/DL (ref 8–23)
BUN/CREAT SERPL: 23 (ref 7–25)
CALCIUM SPEC-SCNC: 9.3 MG/DL (ref 8.6–10.5)
CHLORIDE SERPL-SCNC: 103 MMOL/L (ref 98–107)
CO2 SERPL-SCNC: 27 MMOL/L (ref 22–29)
CREAT SERPL-MCNC: 1 MG/DL (ref 0.57–1)
DEPRECATED RDW RBC AUTO: 44.7 FL (ref 37–54)
EGFRCR SERPLBLD CKD-EPI 2021: 59.2 ML/MIN/1.73
EOSINOPHIL # BLD AUTO: 0.12 10*3/MM3 (ref 0–0.4)
EOSINOPHIL NFR BLD AUTO: 1.7 % (ref 0.3–6.2)
ERYTHROCYTE [DISTWIDTH] IN BLOOD BY AUTOMATED COUNT: 13.1 % (ref 12.3–15.4)
FERRITIN SERPL-MCNC: 178.2 NG/ML (ref 13–150)
GLOBULIN UR ELPH-MCNC: 2.7 GM/DL
GLUCOSE SERPL-MCNC: 90 MG/DL (ref 65–99)
HCT VFR BLD AUTO: 42.8 % (ref 34–46.6)
HGB BLD-MCNC: 14.1 G/DL (ref 12–15.9)
IRON 24H UR-MRATE: 81 MCG/DL (ref 37–145)
IRON SATN MFR SERPL: 22 % (ref 20–50)
LYMPHOCYTES # BLD AUTO: 2.21 10*3/MM3 (ref 0.7–3.1)
LYMPHOCYTES NFR BLD AUTO: 31.4 % (ref 19.6–45.3)
MCH RBC QN AUTO: 31.7 PG (ref 26.6–33)
MCHC RBC AUTO-ENTMCNC: 32.9 G/DL (ref 31.5–35.7)
MCV RBC AUTO: 96.2 FL (ref 79–97)
MONOCYTES # BLD AUTO: 0.45 10*3/MM3 (ref 0.1–0.9)
MONOCYTES NFR BLD AUTO: 6.4 % (ref 5–12)
NEUTROPHILS NFR BLD AUTO: 4.21 10*3/MM3 (ref 1.7–7)
NEUTROPHILS NFR BLD AUTO: 59.9 % (ref 42.7–76)
PLATELET # BLD AUTO: 207 10*3/MM3 (ref 140–450)
PMV BLD AUTO: 10.1 FL (ref 6–12)
POTASSIUM SERPL-SCNC: 4.2 MMOL/L (ref 3.5–5.2)
PROT SERPL-MCNC: 7 G/DL (ref 6–8.5)
RBC # BLD AUTO: 4.45 10*6/MM3 (ref 3.77–5.28)
SODIUM SERPL-SCNC: 140 MMOL/L (ref 136–145)
TIBC SERPL-MCNC: 367 MCG/DL (ref 298–536)
TRANSFERRIN SERPL-MCNC: 246 MG/DL (ref 200–360)
WBC NRBC COR # BLD: 7.03 10*3/MM3 (ref 3.4–10.8)

## 2022-07-19 PROCEDURE — 85025 COMPLETE CBC W/AUTO DIFF WBC: CPT | Performed by: INTERNAL MEDICINE

## 2022-07-19 PROCEDURE — 36415 COLL VENOUS BLD VENIPUNCTURE: CPT | Performed by: INTERNAL MEDICINE

## 2022-07-19 PROCEDURE — 83540 ASSAY OF IRON: CPT | Performed by: INTERNAL MEDICINE

## 2022-07-19 PROCEDURE — 84466 ASSAY OF TRANSFERRIN: CPT | Performed by: INTERNAL MEDICINE

## 2022-07-19 PROCEDURE — 80053 COMPREHEN METABOLIC PANEL: CPT | Performed by: INTERNAL MEDICINE

## 2022-07-19 PROCEDURE — 82728 ASSAY OF FERRITIN: CPT | Performed by: INTERNAL MEDICINE

## 2022-07-25 NOTE — PROGRESS NOTES
Hematology/Oncology Outpatient Follow Up    PATIENT NAME:Sandra Gonzalez  :1945  MRN: [id number]  PRIMARY CARE PHYSICIAN: Natalie Garcia DO  REFERRING PHYSICIAN: Natalie Garcia DO    Chief Complaint   Patient presents with   • Follow-up     Iron overload syndrome        HISTORY OF PRESENT ILLNESS:     This is a 74-year-old female who is here today due to elevated calcium level in her blood.  Patient states that she has been on oral calcium supplementation along with vitamin D.  On a routine biochemical testing she was found to have elevated calcium to 11.  An ionized calcium level was noted to be 1.5.  Patient denies any night sweats or fatigue symptoms.  She has been on weight watchers diet and has lost approximately more than 20 pounds intentionally.  She has occasional right hip pain right joint aching pain.  Overall she feels well otherwise.  Also on her routine lab test was found to have an elevated ferritin to 279 on 2020.  She has no history of iron overload issues.  Her serum iron and  serum iron saturation were normal.  There is no family history of liver cancer, cirrhosis of the liver.     We have her labs from   Which showed her white count was 6.2, hemoglobin 13.3 and platelets were 236, her differentials where 57% neutrophils, 34% lymphocytes, there was no monocytosis, eosinophilia or basophilia.  BUN was 17, creatinine 0.93 and serum calcium was 11.  She has normal total protein and albumin levels.  Liver function tests were also unremarkable.  Serum iron was normal at 86, iron saturation was normal at 28.  TSH was normal and vitamin D1 was also normal at 43.1.  Sed rate was 6.     Patient also has a history of bladder cancer, kidney cancer and cervical cancer.  · 2020 patient had a CT scan of the chest, abdomen and pelvis, this revealed nonspecific 4 to 5 mm pulmonary nodules in the lower lobes follow-up CT of the chest in 3 months has been recommended in the abdomen  there where probable hepatic cyst but no evidence of masses in the abdomen.  · 2/17/2020 patient had a parathyroid hormone level which was elevated at 121, sed rate was normal at 10, C-reactive protein was normal at 0.06 IgA was normal at 157, IgG was normal at 935 and IgM normal at 70 her white count was 7.7, hemoglobin 13.9 and platelets were 224 with unremarkable differential.  Patient had HFE analysis which did not show any mutations.  SPEP with ALISA did not show any evidence of monoclonal protein.  She has normal free light chains.  · 3/9/2020-patient was referred to Dr. Burnham for elevated parathyroid hormone.  Further work-up has been recommended including 24-hour urine calcium evaluation, and DEXA scan patient also will be referred to surgeon by Dr. Burnham  · 6/2/2020 patient had iron studies which showed a normal serum iron 95, iron saturation was normal at 25%.  TIBC is normal at 337.  Ferritin is 195  · 6/2/2020-patient had a serum calcium level which was normal at 10  · 6/9/2020-patient had CT scan of the chest which showed stable pulmonary nodules.  Follow-up in 6 months has been recommended  · 1/25/2022: Patient here for follow up visit.  Ferritin 203.10, iron sat 25, iron 91, transferrin 242, TIBC 361. Hemoglobin 13.6, HCT 42.5, MCV 94.9, MCH 30.4    History of present illness was reviewed and is unchanged from the previous visit. 01/25/22      Past Medical History:   Diagnosis Date   • Achilles tendinitis    • Allergic    • Bladder Cancer     2016/ 2019 Bladder Tumor Sx----------Dr. Saldana    • DDD, lumbar    • DEXA     2013 = (0.8/ -0.1)/ 2020= (-1.2/ -1.7)   • HL (hearing loss)     Wear hearing aids   • Hyperlipidemia    • Hypertension    • Lower back pain    • MAMMO     NEG = 2019/ 2020/ 2021   • Mitral valve prolapse    • Parathyroid adenoma     s/p Sx   • Pulmonary nodule    • Renal Cell Cancer    • Rheumatic fever    • Scoliosis    • Vitamin D deficiency        Past Surgical History:    Procedure Laterality Date   • ADENOIDECTOMY     • APPENDECTOMY     • BLADDER TUMOR EXCISION  05/2019    x 2   • CHOLECYSTECTOMY     • COLONOSCOPY      2018= NEG, rech 2028?         GSI   • NEPHRECTOMY  09/2009    left nephrectomy   • PARATHYROID GLAND SURGERY  12/2020   • TONSILLECTOMY     • TOTAL ABDOMINAL HYSTERECTOMY WITH SALPINGO OOPHORECTOMY           Current Outpatient Medications:   •  amLODIPine (NORVASC) 5 MG tablet, Take 1 tablet by mouth Daily., Disp: 90 tablet, Rfl: 3  •  Biotin 10 MG tablet, Take 1 tablet by mouth Daily., Disp: , Rfl:   •  cetirizine (zyrTEC) 10 MG tablet, Take 10 mg by mouth Daily., Disp: , Rfl:   •  escitalopram (LEXAPRO) 5 MG tablet, TAKE 1 TABLET BY MOUTH EVERY DAY AT NIGHT, Disp: 90 tablet, Rfl: 0  •  hydrOXYzine (ATARAX) 25 MG tablet, Take 1 tablet by mouth Daily As Needed for Itching., Disp: 40 tablet, Rfl: 0    Allergies   Allergen Reactions   • B.F.I. Rash     [employer] [place]   • Benzocaine Rash   • Amoxicillin-Pot Clavulanate Hives   • Oxycodone Nausea And Vomiting       Family History   Problem Relation Age of Onset   • Cancer Mother         She had gallbladder cancer, liver   • Cancer Father 77        Brain tumor - cancer   • No Known Problems Sister    • No Known Problems Brother        Cancer-related family history includes Cancer in her mother; Cancer (age of onset: 77) in her father.    Social History     Tobacco Use   • Smoking status: Never Smoker   • Smokeless tobacco: Never Used   Vaping Use   • Vaping Use: Never used   Substance Use Topics   • Alcohol use: No   • Drug use: No       I have reviewed and confirmed the accuracy of the patient's history: Chief complaint, HPI and ROS as entered by the MA/LPN/RN. Cinthya Hall MD 07/26/22     SUBJECTIVE:    She is here today for routine follow-up and does not have any new issues        REVIEW OF SYSTEMS:    Review of Systems   Constitutional: Negative for chills and fever.   HENT: Negative for ear pain, mouth  "sores, nosebleeds and sore throat.    Eyes: Negative for photophobia and visual disturbance.   Respiratory: Negative for wheezing and stridor.    Cardiovascular: Negative for chest pain and palpitations.   Gastrointestinal: Negative for abdominal pain, diarrhea, nausea and vomiting.   Endocrine: Negative for cold intolerance and heat intolerance.   Genitourinary: Negative for dysuria and hematuria.   Musculoskeletal: Negative for joint swelling and neck stiffness.   Skin: Negative for color change and rash.   Neurological: Negative for seizures and syncope.   Hematological: Negative for adenopathy.        No obvious bleeding   Psychiatric/Behavioral: Negative for agitation, confusion and hallucinations.     ROS was reviewed and is updated from the previous visit.1/25/2022     OBJECTIVE:    Vitals:    07/26/22 1035   BP: 153/76   Pulse: 73   Temp: 96.9 °F (36.1 °C)   SpO2: 98%   Weight: 77.6 kg (171 lb)  Comment: verbal   Height: 162.6 cm (64\")   PainSc: 0-No pain     Body mass index is 29.35 kg/m².    ECOG    (1) Restricted in physically strenuous activity, ambulatory and able to do work of light nature    Physical Exam   Constitutional: She is oriented to person, place, and time. No distress.   HENT:   Head: Normocephalic and atraumatic.   Right Ear: Tympanic membrane, external ear and ear canal normal.   Left Ear: Tympanic membrane, external ear and ear canal normal.   Nose: Nose normal. No rhinorrhea or congestion.   Mouth/Throat: Mucous membranes are moist. No oropharyngeal exudate. Oropharynx is clear.   Eyes: Pupils are equal, round, and reactive to light. Conjunctivae are normal. Right eye exhibits no discharge. Left eye exhibits no discharge. No scleral icterus.   Neck: No thyromegaly present.   Cardiovascular: Normal rate, regular rhythm and normal heart sounds. Exam reveals no gallop and no friction rub.   Pulmonary/Chest: Effort normal and breath sounds normal. No stridor. No respiratory distress. She has " no wheezes.   Abdominal: Soft. Bowel sounds are normal. She exhibits no mass. There is no abdominal tenderness. There is no rebound and no guarding.   Genitourinary:    Genitourinary Comments: deferred     Musculoskeletal: Normal range of motion. No tenderness.      Right lower leg: No edema.      Left lower leg: No edema.   Lymphadenopathy:     She has no cervical adenopathy.   Neurological: She is alert and oriented to person, place, and time. She exhibits normal muscle tone.   Skin: Skin is warm. Capillary refill takes 2 to 3 seconds. No lesion and no rash noted. She is not diaphoretic. No erythema.   Psychiatric: Her behavior is normal. Mood, judgment and thought content normal.   Nursing note and vitals reviewed.    Physical examination was reviewed and is updated or unchanged from the previous visit. 1/25/2022    I have reexamined the patient and the results are consistent with the previously documented exam. Cinthya Hall MD   RECENT LABS    WBC   Date Value Ref Range Status   07/19/2022 7.03 3.40 - 10.80 10*3/mm3 Final   12/03/2020 6.40 4.5 - 11.0 10*3/uL Final     RBC   Date Value Ref Range Status   07/19/2022 4.45 3.77 - 5.28 10*6/mm3 Final   12/03/2020 4.37 4.0 - 5.2 10*6/uL Final     Hemoglobin   Date Value Ref Range Status   07/19/2022 14.1 12.0 - 15.9 g/dL Final   12/03/2020 13.5 12.0 - 16.0 g/dL Final     Hematocrit   Date Value Ref Range Status   07/19/2022 42.8 34.0 - 46.6 % Final   12/03/2020 41.6 36.0 - 46.0 % Final     MCV   Date Value Ref Range Status   07/19/2022 96.2 79.0 - 97.0 fL Final   12/03/2020 95.2 80.0 - 100.0 fL Final     MCH   Date Value Ref Range Status   07/19/2022 31.7 26.6 - 33.0 pg Final   12/03/2020 30.9 26.0 - 34.0 pg Final     MCHC   Date Value Ref Range Status   07/19/2022 32.9 31.5 - 35.7 g/dL Final   12/03/2020 32.5 31.0 - 37.0 g/dL Final     RDW   Date Value Ref Range Status   07/19/2022 13.1 12.3 - 15.4 % Final   12/03/2020 13.0 12.0 - 16.8 % Final     RDW-SD    Date Value Ref Range Status   07/19/2022 44.7 37.0 - 54.0 fl Final     MPV   Date Value Ref Range Status   07/19/2022 10.1 6.0 - 12.0 fL Final   12/03/2020 10.9 8.4 - 12.4 fL Final     Platelets   Date Value Ref Range Status   07/19/2022 207 140 - 450 10*3/mm3 Final   12/03/2020 208 140 - 440 10*3/uL Final     Neutrophil Rel %   Date Value Ref Range Status   12/03/2020 58.4 45 - 80 % Final     Neutrophil %   Date Value Ref Range Status   07/19/2022 59.9 42.7 - 76.0 % Final     Lymphocyte Rel %   Date Value Ref Range Status   12/03/2020 31.7 15 - 50 % Final     Lymphocyte %   Date Value Ref Range Status   07/19/2022 31.4 19.6 - 45.3 % Final     Monocyte Rel %   Date Value Ref Range Status   12/03/2020 6.9 0 - 15 % Final     Monocyte %   Date Value Ref Range Status   07/19/2022 6.4 5.0 - 12.0 % Final     Eosinophil %   Date Value Ref Range Status   07/19/2022 1.7 0.3 - 6.2 % Final   12/03/2020 2.2 0 - 7 % Final     Basophil Rel %   Date Value Ref Range Status   12/03/2020 0.5 0 - 2 % Final     Basophil %   Date Value Ref Range Status   07/19/2022 0.6 0.0 - 1.5 % Final     Immature Grans %   Date Value Ref Range Status   12/03/2020 0.3 0.0 - 1.0 % Final     Neutrophils Absolute   Date Value Ref Range Status   12/03/2020 3.74 2.0 - 8.8 10*3/uL Final     Neutrophils, Absolute   Date Value Ref Range Status   07/19/2022 4.21 1.70 - 7.00 10*3/mm3 Final     Lymphocytes Absolute   Date Value Ref Range Status   12/03/2020 2.03 0.7 - 5.5 10*3/uL Final     Lymphocytes, Absolute   Date Value Ref Range Status   07/19/2022 2.21 0.70 - 3.10 10*3/mm3 Final     Monocytes Absolute   Date Value Ref Range Status   12/03/2020 0.44 0.0 - 1.7 10*3/uL Final     Monocytes, Absolute   Date Value Ref Range Status   07/19/2022 0.45 0.10 - 0.90 10*3/mm3 Final     Eosinophils Absolute   Date Value Ref Range Status   12/03/2020 0.14 0.0 - 0.8 10*3/uL Final     Eosinophils, Absolute   Date Value Ref Range Status   07/19/2022 0.12 0.00 - 0.40  10*3/mm3 Final     Basophils Absolute   Date Value Ref Range Status   12/03/2020 0.03 0.0 - 0.2 10*3/uL Final     Basophils, Absolute   Date Value Ref Range Status   07/19/2022 0.04 0.00 - 0.20 10*3/mm3 Final     Immature Grans, Absolute   Date Value Ref Range Status   12/03/2020 0.02 0.00 - 0.10 10*3/uL Final     nRBC   Date Value Ref Range Status   12/03/2020 0 0 /100(WBC) Final       Lab Results   Component Value Date    GLUCOSE 90 07/19/2022    BUN 23 07/19/2022    CREATININE 1.00 07/19/2022    EGFRIFNONA 63 01/25/2022    BCR 23.0 07/19/2022    K 4.2 07/19/2022    CO2 27.0 07/19/2022    CALCIUM 9.3 07/19/2022    PROTENTOTREF 6.7 02/17/2020    ALBUMIN 4.30 07/19/2022    LABIL2 1.4 02/17/2020    AST 18 07/19/2022    ALT 12 07/19/2022         ASSESSMENT:    1. Hypercalcemia due to hyperparathyroidism: Followed by endocrinology status post parathyroidectomy.  Reviewed  2. Elevated parathyroid hormone level: Status post para thyroidectomy fall 2020  3. Pulmonary nodules 4 to 5 mm. Ongoing management stable CT scan follow-up in 6 months recommended: Reviewed: Patient is now due for a follow-up CT of the chest.  Patient is scheduled for follow-up CT of the chest later on  4. Elevated ferritin level which may be reactive versus iron overload syndrome.  Patient has normal serum iron, and iron saturation.  HFE analysis with no evidence of mutation.  Ferritin level 203.10 which is slightly elevated.  Check iron levels  5. Personal history of multiple malignancies including kidney cancer, bladder cancer and cervical cancer.  Formation given on cancer genetics today  6. Urticaria: Followed by dermatology and receiving steroidal injection- resolved- taking Zyrtec daily which is effective  7. New auditory hallucination- resolved: MRI negative    PLANS:        1.  Obtain CT scan of the chest which is due now  2.  Follow-up with her neurologist  3. Continue follow up with Endocrinology, pulmonary, and PCP provider  4.  Follow-up  in 6 months with recent iron studies done 1 week before the appointment  8.  Recommend genetic testing for high risk evaluation due to history of 3 primary cancers including kidney, bladder and cervical cancer.  9.  All questions answered      I spent 30 total minutes, face-to-face, caring for Sandra today.  90% of this time involved counseling and/or coordination of care as documented within this note.

## 2022-07-26 ENCOUNTER — OFFICE VISIT (OUTPATIENT)
Dept: ONCOLOGY | Facility: CLINIC | Age: 77
End: 2022-07-26

## 2022-07-26 ENCOUNTER — APPOINTMENT (OUTPATIENT)
Dept: LAB | Facility: HOSPITAL | Age: 77
End: 2022-07-26

## 2022-07-26 VITALS
WEIGHT: 171 LBS | HEIGHT: 64 IN | TEMPERATURE: 96.9 F | BODY MASS INDEX: 29.19 KG/M2 | OXYGEN SATURATION: 98 % | SYSTOLIC BLOOD PRESSURE: 153 MMHG | HEART RATE: 73 BPM | DIASTOLIC BLOOD PRESSURE: 76 MMHG

## 2022-07-26 DIAGNOSIS — E83.19 IRON OVERLOAD SYNDROME: Primary | ICD-10-CM

## 2022-07-26 PROCEDURE — 99214 OFFICE O/P EST MOD 30 MIN: CPT | Performed by: INTERNAL MEDICINE

## 2022-07-28 ENCOUNTER — HOSPITAL ENCOUNTER (OUTPATIENT)
Dept: PET IMAGING | Facility: HOSPITAL | Age: 77
Discharge: HOME | End: 2022-07-28
Admitting: INTERNAL MEDICINE

## 2022-07-28 DIAGNOSIS — R91.8 ABNORMAL CHEST X-RAY WITH MULTIPLE LUNG NODULES: ICD-10-CM

## 2022-07-28 DIAGNOSIS — R79.89 HIGH SERUM FERRITIN: ICD-10-CM

## 2022-07-28 DIAGNOSIS — E83.19 IRON OVERLOAD SYNDROME: ICD-10-CM

## 2022-07-28 PROCEDURE — 71250 CT THORAX DX C-: CPT

## 2022-09-20 ENCOUNTER — OFFICE VISIT (OUTPATIENT)
Dept: FAMILY MEDICINE CLINIC | Facility: CLINIC | Age: 77
End: 2022-09-20

## 2022-09-20 VITALS
DIASTOLIC BLOOD PRESSURE: 74 MMHG | WEIGHT: 170 LBS | RESPIRATION RATE: 16 BRPM | TEMPERATURE: 98.4 F | SYSTOLIC BLOOD PRESSURE: 130 MMHG | HEART RATE: 77 BPM | BODY MASS INDEX: 29.02 KG/M2 | HEIGHT: 64 IN | OXYGEN SATURATION: 99 %

## 2022-09-20 DIAGNOSIS — Z23 NEED FOR INFLUENZA VACCINATION: ICD-10-CM

## 2022-09-20 DIAGNOSIS — L30.9 ECZEMA, UNSPECIFIED TYPE: Primary | ICD-10-CM

## 2022-09-20 DIAGNOSIS — M23.8X2 KNEE JOINT LAXITY, LEFT: ICD-10-CM

## 2022-09-20 DIAGNOSIS — Z12.31 ENCOUNTER FOR SCREENING MAMMOGRAM FOR MALIGNANT NEOPLASM OF BREAST: ICD-10-CM

## 2022-09-20 PROCEDURE — 99213 OFFICE O/P EST LOW 20 MIN: CPT | Performed by: FAMILY MEDICINE

## 2022-09-20 PROCEDURE — G0008 ADMIN INFLUENZA VIRUS VAC: HCPCS | Performed by: FAMILY MEDICINE

## 2022-09-20 PROCEDURE — 90662 IIV NO PRSV INCREASED AG IM: CPT | Performed by: FAMILY MEDICINE

## 2022-09-20 RX ORDER — METHYLPREDNISOLONE 4 MG/1
TABLET ORAL
Qty: 21 TABLET | Refills: 0 | Status: SHIPPED | OUTPATIENT
Start: 2022-09-20

## 2022-09-20 RX ORDER — LORATADINE 10 MG/1
10 TABLET ORAL DAILY
Start: 2022-09-20

## 2022-09-20 RX ORDER — DIPHENOXYLATE HYDROCHLORIDE AND ATROPINE SULFATE 2.5; .025 MG/1; MG/1
TABLET ORAL
COMMUNITY
Start: 2022-07-14

## 2022-09-20 RX ORDER — MULTIVIT WITH MINERALS/LUTEIN
TABLET ORAL
COMMUNITY
Start: 2022-07-14 | End: 2023-03-29

## 2022-09-20 RX ORDER — TRIAMCINOLONE ACETONIDE 1 MG/G
1 CREAM TOPICAL 2 TIMES DAILY
Qty: 80 G | Refills: 0 | Status: SHIPPED | OUTPATIENT
Start: 2022-09-20

## 2022-09-28 DIAGNOSIS — M23.8X2 KNEE JOINT LAXITY, LEFT: Primary | ICD-10-CM

## 2022-09-28 DIAGNOSIS — M25.562 ACUTE PAIN OF LEFT KNEE: ICD-10-CM

## 2022-09-28 DIAGNOSIS — M25.362 INSTABILITY OF KNEE JOINT, LEFT: ICD-10-CM

## 2022-10-04 ENCOUNTER — TELEPHONE (OUTPATIENT)
Dept: PEDIATRICS | Facility: OTHER | Age: 77
End: 2022-10-04

## 2022-10-04 DIAGNOSIS — M25.562 ACUTE PAIN OF LEFT KNEE: ICD-10-CM

## 2022-10-04 DIAGNOSIS — M23.8X2 KNEE JOINT LAXITY, LEFT: Primary | ICD-10-CM

## 2022-10-04 DIAGNOSIS — M25.362 INSTABILITY OF KNEE JOINT, LEFT: ICD-10-CM

## 2022-10-04 NOTE — TELEPHONE ENCOUNTER
Hub staff attempted to follow warm transfer process and was unsuccessful     Caller: Sandra Gonzalez    Relationship to patient: Self    Best call back number: [phone]    Patient is needing: ON 9/28/2022 NOTE STATES PATIENT IS NEEDING A NURSE VISIT FOR AN X-RAY. PLEASE CONTACT TO SCHEDULE.

## 2022-10-05 ENCOUNTER — TELEPHONE (OUTPATIENT)
Dept: FAMILY MEDICINE CLINIC | Facility: CLINIC | Age: 77
End: 2022-10-05

## 2022-10-05 ENCOUNTER — CLINICAL SUPPORT (OUTPATIENT)
Dept: FAMILY MEDICINE CLINIC | Facility: CLINIC | Age: 77
End: 2022-10-05

## 2022-10-05 NOTE — TELEPHONE ENCOUNTER
HUB to read  My chart message was sent to the patient.     Moderate arthritis behind kneecap-----but rest of knee looks pretty good

## 2022-10-05 NOTE — TELEPHONE ENCOUNTER
----- Message from Natalie Garcia DO sent at 10/5/2022  7:00 PM EDT -----  Moderate arthritis behind kneecap-----but rest of knee looks pretty good

## 2022-11-14 ENCOUNTER — APPOINTMENT (OUTPATIENT)
Dept: MAMMOGRAPHY | Facility: HOSPITAL | Age: 77
End: 2022-11-14

## 2022-11-17 ENCOUNTER — HOSPITAL ENCOUNTER (OUTPATIENT)
Dept: MAMMOGRAPHY | Facility: HOSPITAL | Age: 77
Discharge: HOME | End: 2022-11-17
Admitting: FAMILY MEDICINE

## 2022-11-17 DIAGNOSIS — Z12.31 ENCOUNTER FOR SCREENING MAMMOGRAM FOR MALIGNANT NEOPLASM OF BREAST: ICD-10-CM

## 2022-11-17 PROCEDURE — 77067 SCR MAMMO BI INCL CAD: CPT

## 2022-11-17 PROCEDURE — 77063 BREAST TOMOSYNTHESIS BI: CPT

## 2022-11-21 ENCOUNTER — TELEPHONE (OUTPATIENT)
Dept: FAMILY MEDICINE CLINIC | Facility: CLINIC | Age: 77
End: 2022-11-21

## 2023-01-17 ENCOUNTER — LAB (OUTPATIENT)
Dept: LAB | Facility: HOSPITAL | Age: 78
End: 2023-01-17
Payer: MEDICARE

## 2023-01-17 DIAGNOSIS — E83.19 IRON OVERLOAD SYNDROME: ICD-10-CM

## 2023-01-17 LAB
ALBUMIN SERPL-MCNC: 4.3 G/DL (ref 3.5–5.2)
ALBUMIN/GLOB SERPL: 1.9 G/DL
ALP SERPL-CCNC: 65 U/L (ref 39–117)
ALT SERPL W P-5'-P-CCNC: 16 U/L (ref 1–33)
ANION GAP SERPL CALCULATED.3IONS-SCNC: 9 MMOL/L (ref 5–15)
AST SERPL-CCNC: 17 U/L (ref 1–32)
BASOPHILS # BLD AUTO: 0.03 10*3/MM3 (ref 0–0.2)
BASOPHILS NFR BLD AUTO: 0.4 % (ref 0–1.5)
BILIRUB SERPL-MCNC: 0.4 MG/DL (ref 0–1.2)
BUN SERPL-MCNC: 25 MG/DL (ref 8–23)
BUN/CREAT SERPL: 22.7 (ref 7–25)
CALCIUM SPEC-SCNC: 9.4 MG/DL (ref 8.6–10.5)
CHLORIDE SERPL-SCNC: 105 MMOL/L (ref 98–107)
CO2 SERPL-SCNC: 27 MMOL/L (ref 22–29)
CREAT SERPL-MCNC: 1.1 MG/DL (ref 0.57–1)
DEPRECATED RDW RBC AUTO: 44 FL (ref 37–54)
EGFRCR SERPLBLD CKD-EPI 2021: 52.5 ML/MIN/1.73
EOSINOPHIL # BLD AUTO: 0.14 10*3/MM3 (ref 0–0.4)
EOSINOPHIL NFR BLD AUTO: 2 % (ref 0.3–6.2)
ERYTHROCYTE [DISTWIDTH] IN BLOOD BY AUTOMATED COUNT: 13 % (ref 12.3–15.4)
FERRITIN SERPL-MCNC: 198.7 NG/ML (ref 13–150)
GLOBULIN UR ELPH-MCNC: 2.3 GM/DL
GLUCOSE SERPL-MCNC: 92 MG/DL (ref 65–99)
HCT VFR BLD AUTO: 41.5 % (ref 34–46.6)
HGB BLD-MCNC: 13.6 G/DL (ref 12–15.9)
IRON 24H UR-MRATE: 76 MCG/DL (ref 37–145)
IRON SATN MFR SERPL: 21 % (ref 20–50)
LYMPHOCYTES # BLD AUTO: 2.11 10*3/MM3 (ref 0.7–3.1)
LYMPHOCYTES NFR BLD AUTO: 29.4 % (ref 19.6–45.3)
MCH RBC QN AUTO: 31.3 PG (ref 26.6–33)
MCHC RBC AUTO-ENTMCNC: 32.8 G/DL (ref 31.5–35.7)
MCV RBC AUTO: 95.4 FL (ref 79–97)
MONOCYTES # BLD AUTO: 0.57 10*3/MM3 (ref 0.1–0.9)
MONOCYTES NFR BLD AUTO: 7.9 % (ref 5–12)
NEUTROPHILS NFR BLD AUTO: 4.32 10*3/MM3 (ref 1.7–7)
NEUTROPHILS NFR BLD AUTO: 60.3 % (ref 42.7–76)
PLATELET # BLD AUTO: 213 10*3/MM3 (ref 140–450)
PMV BLD AUTO: 9.9 FL (ref 6–12)
POTASSIUM SERPL-SCNC: 4.4 MMOL/L (ref 3.5–5.2)
PROT SERPL-MCNC: 6.6 G/DL (ref 6–8.5)
RBC # BLD AUTO: 4.35 10*6/MM3 (ref 3.77–5.28)
SODIUM SERPL-SCNC: 141 MMOL/L (ref 136–145)
TIBC SERPL-MCNC: 361 MCG/DL (ref 298–536)
TRANSFERRIN SERPL-MCNC: 242 MG/DL (ref 200–360)
WBC NRBC COR # BLD: 7.17 10*3/MM3 (ref 3.4–10.8)

## 2023-01-17 PROCEDURE — 36415 COLL VENOUS BLD VENIPUNCTURE: CPT

## 2023-01-17 PROCEDURE — 85025 COMPLETE CBC W/AUTO DIFF WBC: CPT

## 2023-01-17 PROCEDURE — 82728 ASSAY OF FERRITIN: CPT

## 2023-01-17 PROCEDURE — 83540 ASSAY OF IRON: CPT

## 2023-01-17 PROCEDURE — 80053 COMPREHEN METABOLIC PANEL: CPT

## 2023-01-17 PROCEDURE — 84466 ASSAY OF TRANSFERRIN: CPT

## 2023-01-24 ENCOUNTER — APPOINTMENT (OUTPATIENT)
Dept: LAB | Facility: HOSPITAL | Age: 78
End: 2023-01-24
Payer: MEDICARE

## 2023-01-29 NOTE — PROGRESS NOTES
Hematology/Oncology Outpatient Follow Up    PATIENT NAME:Sandra Gonzalez  :1945  MRN: [id number]  PRIMARY CARE PHYSICIAN: Natalie Garcia DO  REFERRING PHYSICIAN: Natalie Garcia DO    Chief Complaint   Patient presents with   • Follow-up     Iron overload syndrome        HISTORY OF PRESENT ILLNESS:     This is a 74-year-old female who is here today due to elevated calcium level in her blood.  Patient states that she has been on oral calcium supplementation along with vitamin D.  On a routine biochemical testing she was found to have elevated calcium to 11.  An ionized calcium level was noted to be 1.5.  Patient denies any night sweats or fatigue symptoms.  She has been on weight watchers diet and has lost approximately more than 20 pounds intentionally.  She has occasional right hip pain right joint aching pain.  Overall she feels well otherwise.  Also on her routine lab test was found to have an elevated ferritin to 279 on 2020.  She has no history of iron overload issues.  Her serum iron and  serum iron saturation were normal.  There is no family history of liver cancer, cirrhosis of the liver.     We have her labs from   Which showed her white count was 6.2, hemoglobin 13.3 and platelets were 236, her differentials where 57% neutrophils, 34% lymphocytes, there was no monocytosis, eosinophilia or basophilia.  BUN was 17, creatinine 0.93 and serum calcium was 11.  She has normal total protein and albumin levels.  Liver function tests were also unremarkable.  Serum iron was normal at 86, iron saturation was normal at 28.  TSH was normal and vitamin D1 was also normal at 43.1.  Sed rate was 6.     Patient also has a history of bladder cancer, kidney cancer and cervical cancer.  · 2020 patient had a CT scan of the chest, abdomen and pelvis, this revealed nonspecific 4 to 5 mm pulmonary nodules in the lower lobes follow-up CT of the chest in 3 months has been recommended in the abdomen  there where probable hepatic cyst but no evidence of masses in the abdomen.  · 2/17/2020 patient had a parathyroid hormone level which was elevated at 121, sed rate was normal at 10, C-reactive protein was normal at 0.06 IgA was normal at 157, IgG was normal at 935 and IgM normal at 70 her white count was 7.7, hemoglobin 13.9 and platelets were 224 with unremarkable differential.  Patient had HFE analysis which did not show any mutations.  SPEP with ALISA did not show any evidence of monoclonal protein.  She has normal free light chains.  · 3/9/2020-patient was referred to Dr. Burnham for elevated parathyroid hormone.  Further work-up has been recommended including 24-hour urine calcium evaluation, and DEXA scan patient also will be referred to surgeon by Dr. Burnham  · 6/2/2020 patient had iron studies which showed a normal serum iron 95, iron saturation was normal at 25%.  TIBC is normal at 337.  Ferritin is 195  · 6/2/2020-patient had a serum calcium level which was normal at 10  · 6/9/2020-patient had CT scan of the chest which showed stable pulmonary nodules.  Follow-up in 6 months has been recommended  · 1/25/2022: Patient here for follow up visit.  Ferritin 203.10, iron sat 25, iron 91, transferrin 242, TIBC 361. Hemoglobin 13.6, HCT 42.5, MCV 94.9, MCH 30.4  · 7/28/2022: CT of the chest without contrast showed stable right upper lobe pulmonary nodules measuring 4 mm, unchanged from 2020 consistent with benign etiology.  No new or enlarging pulmonary nodule.  · 1/17/2023: Routine follow-up.  Ferritin 198.70, iron 76, iron sat 21, TIBC 361.  WBC 7.17, hemoglobin 13.6, MCV 95.4, platelets 213,000.    History of present illness was reviewed and is unchanged from the previous visit. 01/25/22      Past Medical History:   Diagnosis Date   • Achilles tendinitis    • Allergic    • Bladder Cancer     2016/ 2019 Bladder Tumor Sx----------Dr. Saldana    • DDD, lumbar    • DEXA     2013 = (0.8/ -0.1)/ 2020= (-1.2/ -1.7)    • HL (hearing loss)     Wear hearing aids   • Hyperlipidemia    • Hypertension    • Lower back pain    • MAMMO     NEG = 2019/ 2020/ 2021/ 2022   • Mitral valve prolapse    • Parathyroid adenoma     s/p Sx   • Pulmonary nodule    • Renal Cell Cancer     S/P Left Nephrectomy   • Rheumatic fever    • Scoliosis    • Vitamin D deficiency        Past Surgical History:   Procedure Laterality Date   • ADENOIDECTOMY     • APPENDECTOMY     • BLADDER TUMOR EXCISION  05/2019    x 2   • CHOLECYSTECTOMY     • COLONOSCOPY      2018= NEG, rech 2028?         GSI   • NEPHRECTOMY  09/2009    left nephrectomy   • PARATHYROID GLAND SURGERY  12/2020   • TONSILLECTOMY     • TOTAL ABDOMINAL HYSTERECTOMY WITH SALPINGO OOPHORECTOMY           Current Outpatient Medications:   •  amLODIPine (NORVASC) 5 MG tablet, Take 1 tablet by mouth Daily., Disp: 90 tablet, Rfl: 3  •  hydrOXYzine (ATARAX) 25 MG tablet, Take 1 tablet by mouth Daily As Needed for Itching., Disp: 40 tablet, Rfl: 0  •  loratadine (Claritin) 10 MG tablet, Take 1 tablet by mouth Daily., Disp: , Rfl:   •  triamcinolone (KENALOG) 0.1 % cream, Apply 1 application topically to the appropriate area as directed 2 (Two) Times a Day., Disp: 80 g, Rfl: 0  •  Zinc 50 MG capsule, 1 po qd, Disp: , Rfl:   •  ascorbic acid (VITAMIN C) 1000 MG tablet, 1 po qd, Disp: , Rfl:   •  escitalopram (LEXAPRO) 5 MG tablet, TAKE 1 TABLET BY MOUTH EVERY DAY AT NIGHT, Disp: 90 tablet, Rfl: 0  •  methylPREDNISolone (MEDROL) 4 MG dose pack, Take as directed on package instructions., Disp: 21 tablet, Rfl: 0  •  multivitamin (THERAGRAN) tablet tablet, 1 po qd, Disp: , Rfl:     Allergies   Allergen Reactions   • Benzocaine Rash   • Powders Rash     [employer] [place]   • Amoxicillin-Pot Clavulanate Hives   • Oxycodone Nausea And Vomiting       Family History   Problem Relation Age of Onset   • Cancer Mother         She had gallbladder cancer, liver   • Cancer Father 77        Brain tumor - cancer   • No Known  "Problems Sister    • No Known Problems Brother        Cancer-related family history includes Cancer in her mother; Cancer (age of onset: 77) in her father.    Social History     Tobacco Use   • Smoking status: Never   • Smokeless tobacco: Never   Vaping Use   • Vaping Use: Never used   Substance Use Topics   • Alcohol use: No   • Drug use: No       I have reviewed and confirmed the accuracy of the patient's history: Chief complaint, HPI and ROS as entered by the MA/LPN/RN. Myriam Fish, APRN 01/30/23     SUBJECTIVE:          REVIEW OF SYSTEMS:    Review of Systems   Constitutional: Negative for chills and fever.   HENT: Negative for ear pain, mouth sores, nosebleeds and sore throat.    Eyes: Negative for photophobia and visual disturbance.   Respiratory: Negative for wheezing and stridor.    Cardiovascular: Negative for chest pain and palpitations.   Gastrointestinal: Negative for abdominal pain, diarrhea, nausea and vomiting.   Endocrine: Negative for cold intolerance and heat intolerance.   Genitourinary: Negative for dysuria and hematuria.   Musculoskeletal: Negative for joint swelling and neck stiffness.   Skin: Negative for color change and rash.   Neurological: Negative for seizures and syncope.   Hematological: Negative for adenopathy.        No obvious bleeding   Psychiatric/Behavioral: Negative for agitation, confusion and hallucinations.     ROS was reviewed and is updated from the previous visit.1/25/2022     OBJECTIVE:    Vitals:    01/30/23 0915   BP: 153/85   Pulse: 77   Temp: 98.3 °F (36.8 °C)   SpO2: 100%   Weight: 80.9 kg (178 lb 6.4 oz)   Height: 162.6 cm (64\")   PainSc: 0-No pain     Body mass index is 30.62 kg/m².    ECOG    (1) Restricted in physically strenuous activity, ambulatory and able to do work of light nature    Physical Exam   Constitutional: She is oriented to person, place, and time. No distress.   HENT:   Head: Normocephalic and atraumatic.   Right Ear: Tympanic membrane, " external ear and ear canal normal.   Left Ear: Tympanic membrane, external ear and ear canal normal.   Nose: Nose normal. No rhinorrhea or congestion.   Mouth/Throat: Mucous membranes are moist. No oropharyngeal exudate. Oropharynx is clear.   Eyes: Pupils are equal, round, and reactive to light. Conjunctivae are normal. Right eye exhibits no discharge. Left eye exhibits no discharge. No scleral icterus.   Neck: No thyromegaly present.   Cardiovascular: Normal rate, regular rhythm and normal heart sounds. Exam reveals no gallop and no friction rub.   Pulmonary/Chest: Effort normal and breath sounds normal. No stridor. No respiratory distress. She has no wheezes.   Abdominal: Soft. Bowel sounds are normal. She exhibits no mass. There is no abdominal tenderness. There is no rebound and no guarding.   Genitourinary:    Genitourinary Comments: deferred     Musculoskeletal: Normal range of motion. No tenderness.      Right lower leg: No edema.      Left lower leg: No edema.   Lymphadenopathy:     She has no cervical adenopathy.   Neurological: She is alert and oriented to person, place, and time. She exhibits normal muscle tone.   Skin: Skin is warm. Capillary refill takes 2 to 3 seconds. No lesion and no rash noted. She is not diaphoretic. No erythema.   Psychiatric: Her behavior is normal. Mood, judgment and thought content normal.   Nursing note and vitals reviewed.    Physical examination was reviewed and is updated or unchanged from the previous visit. 1/25/2022    I have reexamined the patient and the results are consistent with the previously documented exam. Myriam Fish, TIFFANY   RECENT LABS    WBC   Date Value Ref Range Status   01/17/2023 7.17 3.40 - 10.80 10*3/mm3 Final   12/03/2020 6.40 4.5 - 11.0 10*3/uL Final     RBC   Date Value Ref Range Status   01/17/2023 4.35 3.77 - 5.28 10*6/mm3 Final   12/03/2020 4.37 4.0 - 5.2 10*6/uL Final     Hemoglobin   Date Value Ref Range Status   01/17/2023 13.6 12.0 -  15.9 g/dL Final   12/03/2020 13.5 12.0 - 16.0 g/dL Final     Hematocrit   Date Value Ref Range Status   01/17/2023 41.5 34.0 - 46.6 % Final   12/03/2020 41.6 36.0 - 46.0 % Final     MCV   Date Value Ref Range Status   01/17/2023 95.4 79.0 - 97.0 fL Final   12/03/2020 95.2 80.0 - 100.0 fL Final     MCH   Date Value Ref Range Status   01/17/2023 31.3 26.6 - 33.0 pg Final   12/03/2020 30.9 26.0 - 34.0 pg Final     MCHC   Date Value Ref Range Status   01/17/2023 32.8 31.5 - 35.7 g/dL Final   12/03/2020 32.5 31.0 - 37.0 g/dL Final     RDW   Date Value Ref Range Status   01/17/2023 13.0 12.3 - 15.4 % Final   12/03/2020 13.0 12.0 - 16.8 % Final     RDW-SD   Date Value Ref Range Status   01/17/2023 44.0 37.0 - 54.0 fl Final     MPV   Date Value Ref Range Status   01/17/2023 9.9 6.0 - 12.0 fL Final   12/03/2020 10.9 8.4 - 12.4 fL Final     Platelets   Date Value Ref Range Status   01/17/2023 213 140 - 450 10*3/mm3 Final   12/03/2020 208 140 - 440 10*3/uL Final     Neutrophil Rel %   Date Value Ref Range Status   12/03/2020 58.4 45 - 80 % Final     Neutrophil %   Date Value Ref Range Status   01/17/2023 60.3 42.7 - 76.0 % Final     Lymphocyte Rel %   Date Value Ref Range Status   12/03/2020 31.7 15 - 50 % Final     Lymphocyte %   Date Value Ref Range Status   01/17/2023 29.4 19.6 - 45.3 % Final     Monocyte Rel %   Date Value Ref Range Status   12/03/2020 6.9 0 - 15 % Final     Monocyte %   Date Value Ref Range Status   01/17/2023 7.9 5.0 - 12.0 % Final     Eosinophil %   Date Value Ref Range Status   01/17/2023 2.0 0.3 - 6.2 % Final   12/03/2020 2.2 0 - 7 % Final     Basophil Rel %   Date Value Ref Range Status   12/03/2020 0.5 0 - 2 % Final     Basophil %   Date Value Ref Range Status   01/17/2023 0.4 0.0 - 1.5 % Final     Immature Grans %   Date Value Ref Range Status   12/03/2020 0.3 0.0 - 1.0 % Final     Neutrophils Absolute   Date Value Ref Range Status   12/03/2020 3.74 2.0 - 8.8 10*3/uL Final     Neutrophils, Absolute    Date Value Ref Range Status   01/17/2023 4.32 1.70 - 7.00 10*3/mm3 Final     Lymphocytes Absolute   Date Value Ref Range Status   12/03/2020 2.03 0.7 - 5.5 10*3/uL Final     Lymphocytes, Absolute   Date Value Ref Range Status   01/17/2023 2.11 0.70 - 3.10 10*3/mm3 Final     Monocytes Absolute   Date Value Ref Range Status   12/03/2020 0.44 0.0 - 1.7 10*3/uL Final     Monocytes, Absolute   Date Value Ref Range Status   01/17/2023 0.57 0.10 - 0.90 10*3/mm3 Final     Eosinophils Absolute   Date Value Ref Range Status   12/03/2020 0.14 0.0 - 0.8 10*3/uL Final     Eosinophils, Absolute   Date Value Ref Range Status   01/17/2023 0.14 0.00 - 0.40 10*3/mm3 Final     Basophils Absolute   Date Value Ref Range Status   12/03/2020 0.03 0.0 - 0.2 10*3/uL Final     Basophils, Absolute   Date Value Ref Range Status   01/17/2023 0.03 0.00 - 0.20 10*3/mm3 Final     Immature Grans, Absolute   Date Value Ref Range Status   12/03/2020 0.02 0.00 - 0.10 10*3/uL Final     nRBC   Date Value Ref Range Status   12/03/2020 0 0 /100(WBC) Final       Lab Results   Component Value Date    GLUCOSE 92 01/17/2023    BUN 25 (H) 01/17/2023    CREATININE 1.10 (H) 01/17/2023    EGFRIFNONA 63 01/25/2022    BCR 22.7 01/17/2023    K 4.4 01/17/2023    CO2 27.0 01/17/2023    CALCIUM 9.4 01/17/2023    PROTENTOTREF 6.7 02/17/2020    ALBUMIN 4.3 01/17/2023    LABIL2 1.4 02/17/2020    AST 17 01/17/2023    ALT 16 01/17/2023         ASSESSMENT:    1. Hypercalcemia due to hyperparathyroidism: Followed by endocrinology status post parathyroidectomy.  Reviewed  2. Elevated parathyroid hormone level: Status post para thyroidectomy fall 2020  3. Pulmonary nodules 4 to 5 mm. Ongoing management stable CT scan follow-up in 6 months recommended: Reviewed: Patient is now due for a follow-up CT of the chest.  Patient is scheduled for follow-up CT of the chest later on.  Surveillance has been completed.  4. Elevated ferritin level which may be reactive versus iron overload  syndrome.  Patient has normal serum iron, and iron saturation.  HFE analysis with no evidence of mutation.  Ferritin level is 198.70 which is slightly elevated.  She has normal serum iron and iron saturation.  5. Personal history of multiple malignancies including kidney cancer, bladder cancer and cervical cancer.  Formation given on cancer genetics at previous appointment and she is agreeable to scheduling her for genetic counseling appointment for testing.  6. Urticaria: Followed by dermatology and receiving steroidal injection- resolved- taking Zyrtec daily which is effective  7. New auditory hallucination- resolved: MRI negative    PLANS:        1. Reviewed CT of the chest with the patient which showed no change in the lung nodule since 2020 consistent with benign etiology  2. CBC and CMP reviewed with the patient along with her ferritin level and iron profile  3. Continue to follow-up with urology yearly  4. Continue follow up with Endocrinology, pulmonary, and PCP provider  5. Follow-up in 6 months with recent iron studies done 1 week before the appointment  6. Recommend genetic testing for high risk evaluation due to history of 3 primary cancers including kidney, bladder and cervical cancer.  Patient is agreeable and this is being scheduled.  7. All questions answered      I spent 30 total minutes, face-to-face, caring for Sandra today.  90% of this time involved counseling and/or coordination of care as documented within this note.

## 2023-01-30 ENCOUNTER — APPOINTMENT (OUTPATIENT)
Dept: LAB | Facility: HOSPITAL | Age: 78
End: 2023-01-30
Payer: MEDICARE

## 2023-01-30 ENCOUNTER — OFFICE VISIT (OUTPATIENT)
Dept: ONCOLOGY | Facility: CLINIC | Age: 78
End: 2023-01-30
Payer: MEDICARE

## 2023-01-30 VITALS
HEART RATE: 77 BPM | BODY MASS INDEX: 30.46 KG/M2 | TEMPERATURE: 98.3 F | DIASTOLIC BLOOD PRESSURE: 85 MMHG | OXYGEN SATURATION: 100 % | HEIGHT: 64 IN | SYSTOLIC BLOOD PRESSURE: 153 MMHG | WEIGHT: 178.4 LBS

## 2023-01-30 DIAGNOSIS — E83.19 IRON OVERLOAD SYNDROME: Primary | ICD-10-CM

## 2023-01-30 PROCEDURE — 99214 OFFICE O/P EST MOD 30 MIN: CPT | Performed by: NURSE PRACTITIONER

## 2023-03-29 ENCOUNTER — OFFICE VISIT (OUTPATIENT)
Dept: CARDIOLOGY | Facility: CLINIC | Age: 78
End: 2023-03-29
Payer: MEDICARE

## 2023-03-29 VITALS
BODY MASS INDEX: 29.71 KG/M2 | HEIGHT: 64 IN | DIASTOLIC BLOOD PRESSURE: 76 MMHG | WEIGHT: 174 LBS | OXYGEN SATURATION: 96 % | SYSTOLIC BLOOD PRESSURE: 124 MMHG | HEART RATE: 63 BPM

## 2023-03-29 DIAGNOSIS — I10 ESSENTIAL HYPERTENSION: Primary | ICD-10-CM

## 2023-03-29 DIAGNOSIS — E78.5 DYSLIPIDEMIA: ICD-10-CM

## 2023-03-29 DIAGNOSIS — I34.0 NONRHEUMATIC MITRAL VALVE REGURGITATION: ICD-10-CM

## 2023-03-29 PROCEDURE — 1160F RVW MEDS BY RX/DR IN RCRD: CPT | Performed by: INTERNAL MEDICINE

## 2023-03-29 PROCEDURE — 3078F DIAST BP <80 MM HG: CPT | Performed by: INTERNAL MEDICINE

## 2023-03-29 PROCEDURE — 1159F MED LIST DOCD IN RCRD: CPT | Performed by: INTERNAL MEDICINE

## 2023-03-29 PROCEDURE — 99213 OFFICE O/P EST LOW 20 MIN: CPT | Performed by: INTERNAL MEDICINE

## 2023-03-29 PROCEDURE — 3074F SYST BP LT 130 MM HG: CPT | Performed by: INTERNAL MEDICINE

## 2023-03-29 NOTE — PROGRESS NOTES
"    Subjective:     Encounter Date:03/29/2023      Patient ID: Sandra Gonzalez is a 77 y.o. female.    Chief Complaint:  History of Present Illness 77-year-old white female with history of hypertension hyperlipidemia and mitral regurgitation presents to office for follow-up.  Patient is currently stable without any signs of chest pain or shortness of breath at rest on exertion.  No complains any PND orthopnea.  No palpitation dizziness syncope or swelling of the feet.  She does not smoke.  She is quite active.    The following portions of the patient's history were reviewed and updated as appropriate: allergies, current medications, past family history, past medical history, past social history, past surgical history and problem list.  Past Medical History:   Diagnosis Date   • Achilles tendinitis    • Allergic    • Bladder Cancer     2016/ 2019 Bladder Tumor Sx----------Dr. Saldana    • DDD, lumbar    • DEXA     2013 = (0.8/ -0.1)/ 2020= (-1.2/ -1.7)   • HL (hearing loss)     Wear hearing aids   • Hyperlipidemia    • Hypertension    • Lower back pain    • MAMMO     NEG = 2019/ 2020/ 2021/ 2022   • Mitral valve prolapse    • Parathyroid adenoma     s/p Sx   • Pulmonary nodule    • Renal Cell Cancer     S/P Left Nephrectomy   • Rheumatic fever    • Scoliosis    • Vitamin D deficiency      Past Surgical History:   Procedure Laterality Date   • ADENOIDECTOMY     • APPENDECTOMY     • BLADDER TUMOR EXCISION  05/2019    x 2   • CHOLECYSTECTOMY     • COLONOSCOPY      2018= NEG, rech 2028?         GSI   • NEPHRECTOMY  09/2009    left nephrectomy   • PARATHYROID GLAND SURGERY  12/2020   • TONSILLECTOMY     • TOTAL ABDOMINAL HYSTERECTOMY WITH SALPINGO OOPHORECTOMY       /76   Pulse 63   Ht 162.6 cm (64.02\")   Wt 78.9 kg (174 lb)   LMP 03/10/1990 (LMP Unknown)   SpO2 96%   BMI 29.85 kg/m²   Family History   Problem Relation Age of Onset   • Cancer Mother         She had gallbladder cancer, liver   • Cancer Father 77 "        Brain tumor - cancer   • No Known Problems Sister    • No Known Problems Brother        Current Outpatient Medications:   •  amLODIPine (NORVASC) 5 MG tablet, Take 1 tablet by mouth Daily., Disp: 90 tablet, Rfl: 3  •  escitalopram (LEXAPRO) 5 MG tablet, TAKE 1 TABLET BY MOUTH EVERY DAY AT NIGHT, Disp: 90 tablet, Rfl: 0  •  hydrOXYzine (ATARAX) 25 MG tablet, Take 1 tablet by mouth Daily As Needed for Itching., Disp: 40 tablet, Rfl: 0  •  loratadine (Claritin) 10 MG tablet, Take 1 tablet by mouth Daily., Disp: , Rfl:   •  methylPREDNISolone (MEDROL) 4 MG dose pack, Take as directed on package instructions., Disp: 21 tablet, Rfl: 0  •  multivitamin (THERAGRAN) tablet tablet, 1 po qd, Disp: , Rfl:   •  triamcinolone (KENALOG) 0.1 % cream, Apply 1 application topically to the appropriate area as directed 2 (Two) Times a Day., Disp: 80 g, Rfl: 0  •  Zinc 50 MG capsule, 1 po qd, Disp: , Rfl:   Allergies   Allergen Reactions   • Benzocaine Rash   • Powders Rash     [employer] [place]   • Amoxicillin-Pot Clavulanate Hives   • Oxycodone Nausea And Vomiting     Social History     Socioeconomic History   • Marital status:    Tobacco Use   • Smoking status: Never   • Smokeless tobacco: Never   Vaping Use   • Vaping Use: Never used   Substance and Sexual Activity   • Alcohol use: No   • Drug use: No   • Sexual activity: Yes     Partners: Male     Birth control/protection: None     Comment: I am 76 years old not worried about getting pregnant.     Review of Systems   Constitutional: Negative for malaise/fatigue.   Cardiovascular: Negative for chest pain, dyspnea on exertion, leg swelling and palpitations.   Respiratory: Negative for cough and shortness of breath.    Gastrointestinal: Negative for abdominal pain, nausea and vomiting.   Neurological: Negative for dizziness, focal weakness, headaches, light-headedness and numbness.   All other systems reviewed and are negative.             Objective:     Constitutional:        Appearance: Well-developed.   Eyes:      General: No scleral icterus.     Conjunctiva/sclera: Conjunctivae normal.   HENT:      Head: Normocephalic and atraumatic.   Neck:      Vascular: No carotid bruit or JVD.   Pulmonary:      Effort: Pulmonary effort is normal.      Breath sounds: Normal breath sounds. No wheezing. No rales.   Cardiovascular:      Normal rate. Regular rhythm.      Murmurs: There is a systolic murmur.   Pulses:     Intact distal pulses.   Abdominal:      General: Bowel sounds are normal.      Palpations: Abdomen is soft.   Musculoskeletal:      Cervical back: Normal range of motion and neck supple. Skin:     General: Skin is warm and dry.      Findings: No rash.   Neurological:      Mental Status: Alert.       Procedures    Lab Review:         MDM  1.  Mitral valve disease  Patient has mild mitral valve prolapse with mild mitral regurgitation is currently stable on medications with normal function  2.  Hypertension  Patient blood pressure currently stable on amlodipine  3.  Hyperlipidemia  Patient is using diet for her lipids at this time and followed by the primary care doctor    Patient's previous medical records, labs, and EKG were reviewed and discussed with the patient at today's visit.

## 2023-04-01 RX ORDER — AMLODIPINE BESYLATE 5 MG/1
TABLET ORAL
Qty: 90 TABLET | Refills: 3 | Status: SHIPPED | OUTPATIENT
Start: 2023-04-01

## 2023-04-01 NOTE — TELEPHONE ENCOUNTER
Rx Refill Note  Requested Prescriptions     Pending Prescriptions Disp Refills   • amLODIPine (NORVASC) 5 MG tablet [Pharmacy Med Name: amLODIPine Besylate Oral Tablet 5 MG] 90 tablet 3     Sig: TAKE 1 TABLET BY MOUTH EVERY DAY      Last office visit with prescribing clinician: 3/29/2023   Last telemedicine visit with prescribing clinician: Visit date not found   Next office visit with prescribing clinician: 4/1/2024                         Would you like a call back once the refill request has been completed: [] Yes [] No    If the office needs to give you a call back, can they leave a voicemail: [] Yes [] No    Mattie Toscano MA  04/01/23, 07:08 EDT

## 2023-08-02 ENCOUNTER — LAB (OUTPATIENT)
Dept: LAB | Facility: HOSPITAL | Age: 78
End: 2023-08-02
Payer: MEDICARE

## 2023-08-02 DIAGNOSIS — E83.19 IRON OVERLOAD SYNDROME: ICD-10-CM

## 2023-08-02 LAB
ALBUMIN SERPL-MCNC: 4 G/DL (ref 3.5–5.2)
ALBUMIN/GLOB SERPL: 1.7 G/DL
ALP SERPL-CCNC: 61 U/L (ref 39–117)
ALT SERPL W P-5'-P-CCNC: 20 U/L (ref 1–33)
ANION GAP SERPL CALCULATED.3IONS-SCNC: 9 MMOL/L (ref 5–15)
AST SERPL-CCNC: 15 U/L (ref 1–32)
BASOPHILS # BLD AUTO: 0.03 10*3/MM3 (ref 0–0.2)
BASOPHILS NFR BLD AUTO: 0.5 % (ref 0–1.5)
BILIRUB SERPL-MCNC: 0.4 MG/DL (ref 0–1.2)
BUN SERPL-MCNC: 19 MG/DL (ref 8–23)
BUN/CREAT SERPL: 20 (ref 7–25)
CALCIUM SPEC-SCNC: 9.3 MG/DL (ref 8.6–10.5)
CHLORIDE SERPL-SCNC: 103 MMOL/L (ref 98–107)
CO2 SERPL-SCNC: 28 MMOL/L (ref 22–29)
CREAT SERPL-MCNC: 0.95 MG/DL (ref 0.57–1)
DEPRECATED RDW RBC AUTO: 45.1 FL (ref 37–54)
EGFRCR SERPLBLD CKD-EPI 2021: 62.6 ML/MIN/1.73
EOSINOPHIL # BLD AUTO: 0.12 10*3/MM3 (ref 0–0.4)
EOSINOPHIL NFR BLD AUTO: 2.1 % (ref 0.3–6.2)
ERYTHROCYTE [DISTWIDTH] IN BLOOD BY AUTOMATED COUNT: 13.3 % (ref 12.3–15.4)
FERRITIN SERPL-MCNC: 218.3 NG/ML (ref 13–150)
GLOBULIN UR ELPH-MCNC: 2.4 GM/DL
GLUCOSE SERPL-MCNC: 77 MG/DL (ref 65–99)
HCT VFR BLD AUTO: 40.1 % (ref 34–46.6)
HGB BLD-MCNC: 12.7 G/DL (ref 12–15.9)
IRON 24H UR-MRATE: 87 MCG/DL (ref 37–145)
IRON SATN MFR SERPL: 26 % (ref 20–50)
LYMPHOCYTES # BLD AUTO: 1.75 10*3/MM3 (ref 0.7–3.1)
LYMPHOCYTES NFR BLD AUTO: 30.1 % (ref 19.6–45.3)
MCH RBC QN AUTO: 30.3 PG (ref 26.6–33)
MCHC RBC AUTO-ENTMCNC: 31.7 G/DL (ref 31.5–35.7)
MCV RBC AUTO: 95.7 FL (ref 79–97)
MONOCYTES # BLD AUTO: 0.47 10*3/MM3 (ref 0.1–0.9)
MONOCYTES NFR BLD AUTO: 8.1 % (ref 5–12)
NEUTROPHILS NFR BLD AUTO: 3.45 10*3/MM3 (ref 1.7–7)
NEUTROPHILS NFR BLD AUTO: 59.2 % (ref 42.7–76)
PLATELET # BLD AUTO: 201 10*3/MM3 (ref 140–450)
PMV BLD AUTO: 9.8 FL (ref 6–12)
POTASSIUM SERPL-SCNC: 4.4 MMOL/L (ref 3.5–5.2)
PROT SERPL-MCNC: 6.4 G/DL (ref 6–8.5)
RBC # BLD AUTO: 4.19 10*6/MM3 (ref 3.77–5.28)
SODIUM SERPL-SCNC: 140 MMOL/L (ref 136–145)
TIBC SERPL-MCNC: 329 MCG/DL (ref 298–536)
TRANSFERRIN SERPL-MCNC: 221 MG/DL (ref 200–360)
WBC NRBC COR # BLD: 5.82 10*3/MM3 (ref 3.4–10.8)

## 2023-08-02 PROCEDURE — 85025 COMPLETE CBC W/AUTO DIFF WBC: CPT

## 2023-08-02 PROCEDURE — 84466 ASSAY OF TRANSFERRIN: CPT | Performed by: NURSE PRACTITIONER

## 2023-08-02 PROCEDURE — 82728 ASSAY OF FERRITIN: CPT | Performed by: NURSE PRACTITIONER

## 2023-08-02 PROCEDURE — 36415 COLL VENOUS BLD VENIPUNCTURE: CPT

## 2023-08-02 PROCEDURE — 83540 ASSAY OF IRON: CPT | Performed by: NURSE PRACTITIONER

## 2023-08-02 PROCEDURE — 80053 COMPREHEN METABOLIC PANEL: CPT | Performed by: NURSE PRACTITIONER

## 2023-08-04 NOTE — PROGRESS NOTES
Hematology/Oncology Outpatient Follow Up    PATIENT NAME:Sandra Gonzalez  :1945  MRN: [id number]  PRIMARY CARE PHYSICIAN: Natalie Garcia DO  REFERRING PHYSICIAN: Natalie Garcia DO    Chief Complaint   Patient presents with    Follow-up     Iron overload syndrome        HISTORY OF PRESENT ILLNESS:     This is a 77-year-old female who is here today due to elevated calcium level in her blood.  Patient states that she has been on oral calcium supplementation along with vitamin D.  On a routine biochemical testing she was found to have elevated calcium to 11.  An ionized calcium level was noted to be 1.5.  Patient denies any night sweats or fatigue symptoms.  She has been on weight watchers diet and has lost approximately more than 20 pounds intentionally.  She has occasional right hip pain right joint aching pain.  Overall she feels well otherwise.  Also on her routine lab test was found to have an elevated ferritin to 279 on 2020.  She has no history of iron overload issues.  Her serum iron and  serum iron saturation were normal.  There is no family history of liver cancer, cirrhosis of the liver.     We have her labs from   Which showed her white count was 6.2, hemoglobin 13.3 and platelets were 236, her differentials where 57% neutrophils, 34% lymphocytes, there was no monocytosis, eosinophilia or basophilia.  BUN was 17, creatinine 0.93 and serum calcium was 11.  She has normal total protein and albumin levels.  Liver function tests were also unremarkable.  Serum iron was normal at 86, iron saturation was normal at 28.  TSH was normal and vitamin D1 was also normal at 43.1.  Sed rate was 6.     Patient also has a history of bladder cancer, kidney cancer and cervical cancer.  2020 patient had a CT scan of the chest, abdomen and pelvis, this revealed nonspecific 4 to 5 mm pulmonary nodules in the lower lobes follow-up CT of the chest in 3 months has been recommended in the abdomen  there where probable hepatic cyst but no evidence of masses in the abdomen.  2/17/2020 patient had a parathyroid hormone level which was elevated at 121, sed rate was normal at 10, C-reactive protein was normal at 0.06 IgA was normal at 157, IgG was normal at 935 and IgM normal at 70 her white count was 7.7, hemoglobin 13.9 and platelets were 224 with unremarkable differential.  Patient had HFE analysis which did not show any mutations.  SPEP with ALISA did not show any evidence of monoclonal protein.  She has normal free light chains.  3/9/2020-patient was referred to Dr. Burnham for elevated parathyroid hormone.  Further work-up has been recommended including 24-hour urine calcium evaluation, and DEXA scan patient also will be referred to surgeon by Dr. Burnham  6/2/2020 patient had iron studies which showed a normal serum iron 95, iron saturation was normal at 25%.  TIBC is normal at 337.  Ferritin is 195  6/2/2020-patient had a serum calcium level which was normal at 10  6/9/2020-patient had CT scan of the chest which showed stable pulmonary nodules.  Follow-up in 6 months has been recommended  1/25/2022: Patient here for follow up visit.  Ferritin 203.10, iron sat 25, iron 91, transferrin 242, TIBC 361. Hemoglobin 13.6, HCT 42.5, MCV 94.9, MCH 30.4  7/28/2022: CT of the chest without contrast showed stable right upper lobe pulmonary nodules measuring 4 mm, unchanged from 2020 consistent with benign etiology.  No new or enlarging pulmonary nodule.  1/17/2023: Routine follow-up.  Ferritin 198.70, iron 76, iron sat 21, TIBC 361.  WBC 7.17, hemoglobin 13.6, MCV 95.4, platelets 213,000.    History of present illness was reviewed and is unchanged from the previous visit. 01/25/22      Past Medical History:   Diagnosis Date    Achilles tendinitis     Allergic     Bladder Cancer     2016/ 2019 Bladder Tumor Sx----------Dr. Saldana     DDD, lumbar     DEXA     2013 = (0.8/ -0.1)/ 2020= (-1.2/ -1.7)    HL (hearing loss)      Wear hearing aids    Hyperlipidemia     Hypertension     Lower back pain     MAMMO     NEG = 2019/ 2020/ 2021/ 2022    Mitral valve prolapse     Parathyroid adenoma     s/p Sx    Pulmonary nodule     Renal Cell Cancer     S/P Left Nephrectomy    Rheumatic fever     Scoliosis     Vitamin D deficiency        Past Surgical History:   Procedure Laterality Date    ADENOIDECTOMY      APPENDECTOMY      BLADDER TUMOR EXCISION  05/2019    x 2    CHOLECYSTECTOMY      COLONOSCOPY      2018= NEG, rech 2028?         GSI    NEPHRECTOMY  09/2009    left nephrectomy    PARATHYROID GLAND SURGERY  12/2020    TONSILLECTOMY      TOTAL ABDOMINAL HYSTERECTOMY WITH SALPINGO OOPHORECTOMY           Current Outpatient Medications:     amLODIPine (NORVASC) 5 MG tablet, TAKE 1 TABLET BY MOUTH EVERY DAY, Disp: 90 tablet, Rfl: 3    azithromycin (Zithromax Z-Adams) 250 MG tablet, Take 2 tablets by mouth on day 1, then 1 tablet daily on days 2-5, Disp: 6 tablet, Rfl: 0    hydrOXYzine (ATARAX) 25 MG tablet, Take 1 tablet by mouth Daily As Needed for Itching., Disp: 40 tablet, Rfl: 0    loratadine (Claritin) 10 MG tablet, Take 1 tablet by mouth Daily., Disp: , Rfl:     triamcinolone (KENALOG) 0.1 % cream, Apply 1 application topically to the appropriate area as directed 2 (Two) Times a Day., Disp: 80 g, Rfl: 0    Zinc 50 MG capsule, 1 po qd, Disp: , Rfl:     Allergies   Allergen Reactions    Benzocaine Rash    Powders Rash     Basalm Of [place]    Amoxicillin-Pot Clavulanate Hives    Oxycodone Nausea And Vomiting       Family History   Problem Relation Age of Onset    Cancer Mother         She had gallbladder cancer, liver    Cancer Father 77        Brain tumor - cancer    No Known Problems Sister     No Known Problems Brother        Cancer-related family history includes Cancer in her mother; Cancer (age of onset: 77) in her father.    Social History     Tobacco Use    Smoking status: Never    Smokeless tobacco: Never   Vaping Use    Vaping Use:  "Never used   Substance Use Topics    Alcohol use: No    Drug use: No     I have reviewed and confirmed the accuracy of the patient's history: Chief complaint, HPI, ROS, and Subjective as entered by the MA/LPN/RN. Cinthya Hall MD 08/07/23      SUBJECTIVE:    Complains of sinus congestion          REVIEW OF SYSTEMS:    Review of Systems   Constitutional:  Negative for chills and fever.   HENT:  Negative for ear pain, mouth sores, nosebleeds and sore throat.    Eyes:  Negative for photophobia and visual disturbance.   Respiratory:  Negative for wheezing and stridor.    Cardiovascular:  Negative for chest pain and palpitations.   Gastrointestinal:  Negative for abdominal pain, diarrhea, nausea and vomiting.   Endocrine: Negative for cold intolerance and heat intolerance.   Genitourinary:  Negative for dysuria and hematuria.   Musculoskeletal:  Negative for joint swelling and neck stiffness.   Skin:  Negative for color change and rash.   Neurological:  Negative for seizures and syncope.   Hematological:  Negative for adenopathy.        No obvious bleeding   Psychiatric/Behavioral:  Negative for agitation, confusion and hallucinations.        OBJECTIVE:    Vitals:    08/07/23 0941   BP: 153/79   Pulse: 84   Resp: 18   Temp: 98 øF (36.7 øC)   TempSrc: Infrared   SpO2: 99%   Weight: 77.6 kg (171 lb)   Height: 162.6 cm (64\")   PainSc: 0-No pain     Body mass index is 29.35 kg/mý.    ECOG    (1) Restricted in physically strenuous activity, ambulatory and able to do work of light nature    Physical Exam   Constitutional: She is oriented to person, place, and time. No distress.   HENT:   Head: Normocephalic and atraumatic.   Right Ear: Tympanic membrane, external ear and ear canal normal.   Left Ear: Tympanic membrane, external ear and ear canal normal.   Nose: Nose normal. No rhinorrhea or congestion.   Mouth/Throat: Mucous membranes are moist. No oropharyngeal exudate. Oropharynx is clear.   Eyes: Pupils are equal, " round, and reactive to light. Conjunctivae are normal. Right eye exhibits no discharge. Left eye exhibits no discharge. No scleral icterus.   Neck: No thyromegaly present.   Cardiovascular: Normal rate, regular rhythm and normal heart sounds. Exam reveals no gallop and no friction rub.   Pulmonary/Chest: Effort normal and breath sounds normal. No stridor. No respiratory distress. She has no wheezes.   Abdominal: Soft. Bowel sounds are normal. She exhibits no mass. There is no abdominal tenderness. There is no rebound and no guarding.   Genitourinary:    Genitourinary Comments: deferred     Musculoskeletal: Normal range of motion. No tenderness.      Right lower leg: No edema.      Left lower leg: No edema.   Lymphadenopathy:     She has no cervical adenopathy.   Neurological: She is alert and oriented to person, place, and time. She exhibits normal muscle tone.   Skin: Skin is warm. Capillary refill takes 2 to 3 seconds. No lesion and no rash noted. She is not diaphoretic. No erythema.   Psychiatric: Her behavior is normal. Mood, judgment and thought content normal.   Nursing note and vitals reviewed.  Physical examination was reviewed and is updated or unchanged from the previous visit.       I have reexamined the patient and the results are consistent with the previously documented exam. Cinthya Hall MD        RECENT LABS    WBC   Date Value Ref Range Status   08/02/2023 5.82 3.40 - 10.80 10*3/mm3 Final   12/03/2020 6.40 4.5 - 11.0 10*3/uL Final     RBC   Date Value Ref Range Status   08/02/2023 4.19 3.77 - 5.28 10*6/mm3 Final   12/03/2020 4.37 4.0 - 5.2 10*6/uL Final     Hemoglobin   Date Value Ref Range Status   08/02/2023 12.7 12.0 - 15.9 g/dL Final   12/03/2020 13.5 12.0 - 16.0 g/dL Final     Hematocrit   Date Value Ref Range Status   08/02/2023 40.1 34.0 - 46.6 % Final   12/03/2020 41.6 36.0 - 46.0 % Final     MCV   Date Value Ref Range Status   08/02/2023 95.7 79.0 - 97.0 fL Final   12/03/2020 95.2  80.0 - 100.0 fL Final     MCH   Date Value Ref Range Status   08/02/2023 30.3 26.6 - 33.0 pg Final   12/03/2020 30.9 26.0 - 34.0 pg Final     MCHC   Date Value Ref Range Status   08/02/2023 31.7 31.5 - 35.7 g/dL Final   12/03/2020 32.5 31.0 - 37.0 g/dL Final     RDW   Date Value Ref Range Status   08/02/2023 13.3 12.3 - 15.4 % Final   12/03/2020 13.0 12.0 - 16.8 % Final     RDW-SD   Date Value Ref Range Status   08/02/2023 45.1 37.0 - 54.0 fl Final     MPV   Date Value Ref Range Status   08/02/2023 9.8 6.0 - 12.0 fL Final   12/03/2020 10.9 8.4 - 12.4 fL Final     Platelets   Date Value Ref Range Status   08/02/2023 201 140 - 450 10*3/mm3 Final   12/03/2020 208 140 - 440 10*3/uL Final     Neutrophil Rel %   Date Value Ref Range Status   12/03/2020 58.4 45 - 80 % Final     Neutrophil %   Date Value Ref Range Status   08/02/2023 59.2 42.7 - 76.0 % Final     Lymphocyte Rel %   Date Value Ref Range Status   12/03/2020 31.7 15 - 50 % Final     Lymphocyte %   Date Value Ref Range Status   08/02/2023 30.1 19.6 - 45.3 % Final     Monocyte Rel %   Date Value Ref Range Status   12/03/2020 6.9 0 - 15 % Final     Monocyte %   Date Value Ref Range Status   08/02/2023 8.1 5.0 - 12.0 % Final     Eosinophil %   Date Value Ref Range Status   08/02/2023 2.1 0.3 - 6.2 % Final   12/03/2020 2.2 0 - 7 % Final     Basophil Rel %   Date Value Ref Range Status   12/03/2020 0.5 0 - 2 % Final     Basophil %   Date Value Ref Range Status   08/02/2023 0.5 0.0 - 1.5 % Final     Immature Grans %   Date Value Ref Range Status   12/03/2020 0.3 0.0 - 1.0 % Final     Neutrophils Absolute   Date Value Ref Range Status   12/03/2020 3.74 2.0 - 8.8 10*3/uL Final     Neutrophils, Absolute   Date Value Ref Range Status   08/02/2023 3.45 1.70 - 7.00 10*3/mm3 Final     Lymphocytes Absolute   Date Value Ref Range Status   12/03/2020 2.03 0.7 - 5.5 10*3/uL Final     Lymphocytes, Absolute   Date Value Ref Range Status   08/02/2023 1.75 0.70 - 3.10 10*3/mm3 Final      Monocytes Absolute   Date Value Ref Range Status   12/03/2020 0.44 0.0 - 1.7 10*3/uL Final     Monocytes, Absolute   Date Value Ref Range Status   08/02/2023 0.47 0.10 - 0.90 10*3/mm3 Final     Eosinophils Absolute   Date Value Ref Range Status   12/03/2020 0.14 0.0 - 0.8 10*3/uL Final     Eosinophils, Absolute   Date Value Ref Range Status   08/02/2023 0.12 0.00 - 0.40 10*3/mm3 Final     Basophils Absolute   Date Value Ref Range Status   12/03/2020 0.03 0.0 - 0.2 10*3/uL Final     Basophils, Absolute   Date Value Ref Range Status   08/02/2023 0.03 0.00 - 0.20 10*3/mm3 Final     Immature Grans, Absolute   Date Value Ref Range Status   12/03/2020 0.02 0.00 - 0.10 10*3/uL Final     nRBC   Date Value Ref Range Status   12/03/2020 0 0 /100(WBC) Final       Lab Results   Component Value Date    GLUCOSE 77 08/02/2023    BUN 19 08/02/2023    CREATININE 0.95 08/02/2023    EGFRIFNONA 63 01/25/2022    BCR 20.0 08/02/2023    K 4.4 08/02/2023    CO2 28.0 08/02/2023    CALCIUM 9.3 08/02/2023    PROTENTOTREF 6.7 02/17/2020    ALBUMIN 4.0 08/02/2023    LABIL2 1.4 02/17/2020    AST 15 08/02/2023    ALT 20 08/02/2023         ASSESSMENT:    Hypercalcemia due to hyperparathyroidism: Followed by endocrinology status post parathyroidectomy.  Reviewed  Sinusitis we will go ahead and prescribe Z-Adams  Elevated parathyroid hormone level: Status post para thyroidectomy fall 2020  Pulmonary nodules 4 to 5 mm. Ongoing management stable CT scan follow-up in 6 months recommended: Reviewed: Patient is now due for a follow-up CT of the chest.  Patient is scheduled for follow-up CT of the chest later on.  Surveillance has been completed.  Elevated ferritin level which may be reactive versus iron overload syndrome.  Patient has normal serum iron, and iron saturation.  HFE analysis with no evidence of mutation.  Continue to follow her ferritin levels.  She has normal serum iron and iron saturation  Personal history of multiple malignancies  including kidney cancer, bladder cancer and cervical cancer.  Formation given on cancer genetics at previous appointment and she is agreeable to scheduling her for genetic counseling appointment for testing.  Urticaria: Followed by dermatology and receiving steroidal injection- resolved- taking Zyrtec daily which is effective  New auditory hallucination- resolved: MRI negative    PLANS:        Reviewed CT of the chest with the patient which showed no change in the lung nodule since 2020 consistent with benign etiology  Z-Adams for sinus infection follow-up with PCP if does not resolve  Follow-up in 6 months with CBC CMP iron studies with ferritin done a week before the appointment  Continue to follow-up with urology yearly  Continue follow up with Endocrinology, pulmonary, and PCP provider  All questions answered  Recommend genetic testing for high risk evaluation due to history of 3 primary cancers including kidney, bladder and cervical cancer.  Patient is agreeable and this is being scheduled.        I spent 30 total minutes, face-to-face, caring for Sandra today. 90% of this time involved counseling and/or coordination of care as documented within this note.

## 2023-08-07 ENCOUNTER — OFFICE VISIT (OUTPATIENT)
Dept: ONCOLOGY | Facility: CLINIC | Age: 78
End: 2023-08-07
Payer: MEDICARE

## 2023-08-07 VITALS
DIASTOLIC BLOOD PRESSURE: 79 MMHG | OXYGEN SATURATION: 99 % | TEMPERATURE: 98 F | SYSTOLIC BLOOD PRESSURE: 153 MMHG | RESPIRATION RATE: 18 BRPM | HEIGHT: 64 IN | WEIGHT: 171 LBS | BODY MASS INDEX: 29.19 KG/M2 | HEART RATE: 84 BPM

## 2023-08-07 DIAGNOSIS — D64.9 ANEMIA, UNSPECIFIED TYPE: Primary | ICD-10-CM

## 2023-08-07 PROCEDURE — 3078F DIAST BP <80 MM HG: CPT | Performed by: INTERNAL MEDICINE

## 2023-08-07 PROCEDURE — 3077F SYST BP >= 140 MM HG: CPT | Performed by: INTERNAL MEDICINE

## 2023-08-07 PROCEDURE — 1126F AMNT PAIN NOTED NONE PRSNT: CPT | Performed by: INTERNAL MEDICINE

## 2023-08-07 PROCEDURE — 99214 OFFICE O/P EST MOD 30 MIN: CPT | Performed by: INTERNAL MEDICINE

## 2023-08-07 RX ORDER — AZITHROMYCIN 250 MG/1
TABLET, FILM COATED ORAL
Qty: 6 TABLET | Refills: 0 | Status: SHIPPED | OUTPATIENT
Start: 2023-08-07

## 2023-09-07 NOTE — TELEPHONE ENCOUNTER
Parathyroid does not affect wt.  Stress may be playing a role considering the current pandemic situation.  It takes more exercise to try to maintain wt loss once there is a significant decrease in wt.  Wt watchers is a good, healthy program. She should probably continue it.     Dermal Autograft Text: The defect edges were debeveled with a #15 scalpel blade.  Given the location of the defect, shape of the defect and the proximity to free margins a dermal autograft was deemed most appropriate.  Using a sterile surgical marker, the primary defect shape was transferred to the donor site. The area thus outlined was incised deep to adipose tissue with a #15 scalpel blade.  The harvested graft was then trimmed of adipose and epidermal tissue until only dermis was left.  The skin graft was then placed in the primary defect and oriented appropriately.

## 2023-09-25 ENCOUNTER — OFFICE VISIT (OUTPATIENT)
Dept: FAMILY MEDICINE CLINIC | Facility: CLINIC | Age: 78
End: 2023-09-25
Payer: MEDICARE

## 2023-09-25 VITALS
TEMPERATURE: 97.5 F | DIASTOLIC BLOOD PRESSURE: 82 MMHG | HEART RATE: 78 BPM | BODY MASS INDEX: 29.53 KG/M2 | SYSTOLIC BLOOD PRESSURE: 137 MMHG | OXYGEN SATURATION: 100 % | WEIGHT: 173 LBS | HEIGHT: 64 IN

## 2023-09-25 DIAGNOSIS — I10 PRIMARY HYPERTENSION: ICD-10-CM

## 2023-09-25 DIAGNOSIS — Z12.31 ENCOUNTER FOR SCREENING MAMMOGRAM FOR MALIGNANT NEOPLASM OF BREAST: ICD-10-CM

## 2023-09-25 DIAGNOSIS — Z78.0 POSTMENOPAUSAL: ICD-10-CM

## 2023-09-25 DIAGNOSIS — Z00.00 MEDICARE ANNUAL WELLNESS VISIT, SUBSEQUENT: Primary | ICD-10-CM

## 2023-09-25 DIAGNOSIS — E78.2 MIXED HYPERLIPIDEMIA: ICD-10-CM

## 2023-09-25 DIAGNOSIS — R79.89 ELEVATED FERRITIN LEVEL: ICD-10-CM

## 2023-09-25 PROCEDURE — 3079F DIAST BP 80-89 MM HG: CPT | Performed by: FAMILY MEDICINE

## 2023-09-25 PROCEDURE — 3075F SYST BP GE 130 - 139MM HG: CPT | Performed by: FAMILY MEDICINE

## 2023-09-25 PROCEDURE — G0439 PPPS, SUBSEQ VISIT: HCPCS | Performed by: FAMILY MEDICINE

## 2023-09-25 PROCEDURE — 1159F MED LIST DOCD IN RCRD: CPT | Performed by: FAMILY MEDICINE

## 2023-09-25 PROCEDURE — 1160F RVW MEDS BY RX/DR IN RCRD: CPT | Performed by: FAMILY MEDICINE

## 2023-09-25 RX ORDER — DOXYCYCLINE HYCLATE 100 MG
100 TABLET ORAL DAILY
COMMUNITY
Start: 2023-09-07 | End: 2023-09-25

## 2023-09-25 NOTE — PROGRESS NOTES
The ABCs of the Annual Wellness Visit  Subsequent Medicare Wellness Visit    Chief Complaint   Patient presents with    Medicare Wellness-subsequent       Subjective   History of Present Illness:  Sandra Gonzalez is a 78 y.o. female who presents for a Subsequent Medicare Wellness Visit.    HEALTH RISK ASSESSMENT    Recent Hospitalizations:  No hospitalization(s) within the last year.    Current Medical Providers:  Patient Care Team:  Natalie Garcia DO as PCP - General (Family Medicine)  Jorge Oakes MD as Consulting Physician (Cardiology)  Cinthya Hall MD as Consulting Physician (Hematology and Oncology)  Donald Sibley MD as Consulting Physician (Dermatology)  Hector Burnham MD as Consulting Physician (Endocrinology)  Milton Saldana MD as Consulting Physician (Urology)    Smoking Status:  Social History     Tobacco Use   Smoking Status Never    Passive exposure: Never   Smokeless Tobacco Never       Alcohol Consumption:  Social History     Substance and Sexual Activity   Alcohol Use No       Depression Screen:       9/25/2023     2:00 PM   PHQ-2/PHQ-9 Depression Screening   Little Interest or Pleasure in Doing Things 0-->not at all   Feeling Down, Depressed or Hopeless 0-->not at all   Trouble Falling or Staying Asleep, or Sleeping Too Much 0-->not at all   Feeling Tired or Having Little Energy 0-->not at all   Poor Appetite or Overeating 0-->not at all   Feeling Bad about Yourself - or that You are a Failure or Have Let Yourself or Your Family Down 0-->not at all   Trouble Concentrating on Things, Such as Reading the Newspaper or Watching Television 0-->not at all   Moving or Speaking So Slowly that Other People Could Have Noticed? Or the Opposite - Being So Fidgety 0-->not at all   Thoughts that You Would be Better Off Dead or of Hurting Yourself in Some Way 0-->not at all   PHQ-9: Brief Depression Severity Measure Score 0   If You Checked Off Any Problems, How Difficult Have These  Problems Made It For You to Do Your Work, Take Care of Things at Home, or Get Along with Other People? not difficult at all       Fall Risk Screen:  TAMARA Fall Risk Assessment was completed, and patient is at LOW risk for falls.Assessment completed on:2023    Health Habits and Functional and Cognitive Screenin/25/2023     2:00 PM   Functional & Cognitive Status   Do you have difficulty preparing food and eating? No   Do you have difficulty bathing yourself, getting dressed or grooming yourself? No   Do you have difficulty using the toilet? No   Do you have difficulty moving around from place to place? No   Do you have trouble with steps or getting out of a bed or a chair? No   Current Diet Unhealthy Diet   Dental Exam Up to date   Eye Exam Up to date   Exercise (times per week) 0 times per week   Current Exercises Include No Regular Exercise   Do you need help using the phone?  No   Are you deaf or do you have serious difficulty hearing?  Yes   Do you need help to go to places out of walking distance? No   Do you need help shopping? No   Do you need help preparing meals?  No   Do you need help with housework?  No   Do you need help with laundry? No   Do you need help taking your medications? No   Do you need help managing money? No   Do you ever drive or ride in a car without wearing a seat belt? No   Have you felt unusual stress, anger or loneliness in the last month? No   Who do you live with? Spouse   If you need help, do you have trouble finding someone available to you? No   Have you been bothered in the last four weeks by sexual problems? No   Do you have difficulty concentrating, remembering or making decisions? No         Does the patient have evidence of cognitive impairment? No    Asprin use counseling:Does not need ASA (and currently is not on it)    Age-appropriate Screening Schedule:  Refer to the list below for future screening recommendations based on patient's age, sex and/or medical  conditions. Orders for these recommended tests are listed in the plan section. The patient has been provided with a written plan.    Health Maintenance   Topic Date Due    BMI FOLLOWUP  Never done    TDAP/TD VACCINES (1 - Tdap) Never done    ZOSTER VACCINE (2 of 3) 12/27/2014    DXA SCAN  12/26/2015    HEPATITIS C SCREENING  Never done    LIPID PANEL  01/12/2023    INFLUENZA VACCINE  08/01/2023    COVID-19 Vaccine (5 - 2023-24 season) 09/01/2023    ANNUAL WELLNESS VISIT  09/25/2024    Pneumococcal Vaccine 65+  Completed    COLORECTAL CANCER SCREENING  Discontinued          The following portions of the patient's history were reviewed and updated as appropriate: allergies, current medications, past family history, past medical history, past social history, past surgical history, and problem list.    Outpatient Medications Prior to Visit   Medication Sig Dispense Refill    amLODIPine (NORVASC) 5 MG tablet TAKE 1 TABLET BY MOUTH EVERY DAY 90 tablet 3    hydrOXYzine (ATARAX) 25 MG tablet Take 1 tablet by mouth Daily As Needed for Itching. 40 tablet 0    loratadine (Claritin) 10 MG tablet Take 1 tablet by mouth Daily.      triamcinolone (KENALOG) 0.1 % cream Apply 1 application topically to the appropriate area as directed 2 (Two) Times a Day. 80 g 0    mupirocin (BACTROBAN) 2 % ointment Apply 1 application  topically to the appropriate area as directed Daily.      azithromycin (Zithromax Z-Adams) 250 MG tablet Take 2 tablets by mouth on day 1, then 1 tablet daily on days 2-5 6 tablet 0    doxycycline (VIBRAMYICN) 100 MG tablet Take 1 tablet by mouth Daily. (Patient not taking: Reported on 9/25/2023)      Zinc 50 MG capsule 1 po qd       No facility-administered medications prior to visit.       Patient Active Problem List   Diagnosis    Abnormal urinalysis    Achilles tendinitis    Allergic rhinitis, seasonal    Bladder cancer    Body mass index (BMI) of 25.0 to 29.9    Carcinoma, renal cell    Degeneration of  "intervertebral disc of lumbar region    Dyslipidemia    Hypertension    Low back pain    Mitral regurgitation    Sciatica    Vitamin D deficiency    Primary hyperparathyroidism       Advanced Care Planning:  ACP discussion was held with the patient during this visit. Patient has an advance directive (not in EMR), copy requested.    Review of Systems    Compared to one year ago, the patient feels her physical health is the same.  Compared to one year ago, the patient feels her mental health is the same.    Reviewed chart for potential of high risk medication in the elderly: yes  Reviewed chart for potential of harmful drug interactions in the elderly:yes    Objective         Vitals:    09/25/23 1447   BP: 137/82   Pulse: 78   Temp: 97.5 °F (36.4 °C)   TempSrc: Infrared   SpO2: 100%   Weight: 78.5 kg (173 lb)   Height: 162.6 cm (64.02\")       Body mass index is 29.68 kg/m².  Discussed the patient's BMI with her. The BMI is in the acceptable range.    Physical Exam  Vitals and nursing note reviewed.   Constitutional:       General: She is not in acute distress.     Appearance: She is not ill-appearing or toxic-appearing.   HENT:      Head: Normocephalic and atraumatic.   Cardiovascular:      Rate and Rhythm: Normal rate and regular rhythm.      Heart sounds: No murmur heard.  Pulmonary:      Effort: Pulmonary effort is normal. No respiratory distress.      Breath sounds: Normal breath sounds. No stridor. No wheezing, rhonchi or rales.   Neurological:      Mental Status: She is alert and oriented to person, place, and time.      Cranial Nerves: No cranial nerve deficit.      Gait: Gait normal.   Psychiatric:         Mood and Affect: Mood normal.         Behavior: Behavior normal.         Thought Content: Thought content normal.         Judgment: Judgment normal.                 Assessment & Plan   Medicare Risks and Personalized Health Plan  CMS Preventative Services Quick Reference  Advance Directive Discussion  Breast " Cancer/Mammogram Screening  Colon Cancer Screening  Dementia/Memory   Depression/Dysphoria  Diabetic Lab Screening   Fall Risk  Glaucoma Risk  Hearing Problem  Immunizations Discussed/Encouraged (specific immunizations; Influenza, Pneumococcal 23, Prevnar 20 (Pneumococcal 20-valent conjugate), Vaxneuvance (Pneumococcal 15-valent conjugate), Shingrix, and COVID19 )  Inadequate Social Support, Isolation, Loneliness, Lack of Transportation, Financial Difficulties, or Caregiver Stress   Inactivity/Sedentary  Osteoporosis Risk    The above risks/problems have been discussed with the patient.  Pertinent information has been shared with the patient in the After Visit Summary.  Follow up plans and orders are seen below in the Assessment/Plan Section.    Diagnoses and all orders for this visit:    1. Medicare annual wellness visit, subsequent (Primary)    2. Primary hypertension  -     Comprehensive Metabolic Panel; Future    3. Postmenopausal  -     DEXA Bone Density Axial; Future    4. Encounter for screening mammogram for malignant neoplasm of breast  -     Mammo Screening Digital Tomosynthesis Bilateral With CAD; Future    5. Mixed hyperlipidemia  -     Lipid Panel; Future    6. Elevated ferritin level  -     CBC & Differential; Future  -     Ferritin; Future  -     Iron Profile; Future      Follow Up:  No follow-ups on file.     An After Visit Summary and PPPS were given to the patient.

## 2023-12-05 ENCOUNTER — OFFICE VISIT (OUTPATIENT)
Dept: FAMILY MEDICINE CLINIC | Facility: CLINIC | Age: 78
End: 2023-12-05
Payer: MEDICARE

## 2023-12-05 VITALS
DIASTOLIC BLOOD PRESSURE: 87 MMHG | WEIGHT: 176 LBS | BODY MASS INDEX: 30.05 KG/M2 | TEMPERATURE: 97.3 F | OXYGEN SATURATION: 98 % | HEART RATE: 100 BPM | HEIGHT: 64 IN | SYSTOLIC BLOOD PRESSURE: 143 MMHG

## 2023-12-05 DIAGNOSIS — J02.9 SORE THROAT: Primary | ICD-10-CM

## 2023-12-05 DIAGNOSIS — R68.83 CHILLS: ICD-10-CM

## 2023-12-05 DIAGNOSIS — R51.9 NONINTRACTABLE HEADACHE, UNSPECIFIED CHRONICITY PATTERN, UNSPECIFIED HEADACHE TYPE: ICD-10-CM

## 2023-12-05 DIAGNOSIS — J04.0 LARYNGITIS: ICD-10-CM

## 2023-12-05 DIAGNOSIS — J04.0 ACUTE LARYNGITIS: ICD-10-CM

## 2023-12-05 DIAGNOSIS — U07.1 LAB TEST POSITIVE FOR DETECTION OF COVID-19 VIRUS: ICD-10-CM

## 2023-12-05 LAB
EXPIRATION DATE: ABNORMAL
EXPIRATION DATE: NORMAL
FLUAV AG UPPER RESP QL IA.RAPID: NOT DETECTED
FLUBV AG UPPER RESP QL IA.RAPID: NOT DETECTED
INTERNAL CONTROL: ABNORMAL
INTERNAL CONTROL: NORMAL
Lab: ABNORMAL
Lab: NORMAL
S PYO AG THROAT QL: NEGATIVE
SARS-COV-2 AG UPPER RESP QL IA.RAPID: DETECTED

## 2023-12-05 PROCEDURE — 3079F DIAST BP 80-89 MM HG: CPT | Performed by: NURSE PRACTITIONER

## 2023-12-05 PROCEDURE — 99213 OFFICE O/P EST LOW 20 MIN: CPT | Performed by: NURSE PRACTITIONER

## 2023-12-05 PROCEDURE — 87428 SARSCOV & INF VIR A&B AG IA: CPT | Performed by: NURSE PRACTITIONER

## 2023-12-05 PROCEDURE — 3077F SYST BP >= 140 MM HG: CPT | Performed by: NURSE PRACTITIONER

## 2023-12-05 PROCEDURE — 87880 STREP A ASSAY W/OPTIC: CPT | Performed by: NURSE PRACTITIONER

## 2023-12-05 RX ORDER — CEFDINIR 300 MG/1
300 CAPSULE ORAL 2 TIMES DAILY
Qty: 14 CAPSULE | Refills: 0 | Status: SHIPPED | OUTPATIENT
Start: 2023-12-05

## 2023-12-05 NOTE — PATIENT INSTRUCTIONS
Encourage to stay in quarantine for 5 days  Prescribed Paxlovid and omnicef   Encourage to stay hydrated

## 2023-12-05 NOTE — PROGRESS NOTES
"Chief Complaint  Chief Complaint   Patient presents with    Laryngitis     SOS - yesterday, experienced chills as well, temp ran 99.2    Chills    Headache    Sore Throat        Subjective          Sandra Gonzalez is a 78-year-old female who presents to the office today due to a sore throat.    Sore throat- Reports she woke up Monday with a sore throat and then started to lose her voice. She has had chills and hot flashes. She did have a fever of 99.0 at home. Denies body aches, cough, nausea, congestion, ear pain, or vomiting. She has had a headache. She took tylenol PM last night and was able to sleep last night. She reports she was at a [employer] show on Saturday.          Review of Systems   Constitutional:  Positive for chills and fever.   HENT:  Positive for sore throat. Negative for congestion, sinus pressure and sneezing.    Respiratory:  Negative for cough and shortness of breath.    Cardiovascular:  Negative for chest pain.   Gastrointestinal:  Negative for abdominal pain, nausea and vomiting.   Genitourinary:  Negative for difficulty urinating.   Musculoskeletal:  Negative for myalgias.   Neurological:  Positive for headache.        Objective   Vital Signs:   Vitals:    12/05/23 1148   BP: 143/87   Pulse:    Temp:    SpO2:       Estimated body mass index is 30.2 kg/m² as calculated from the following:    Height as of this encounter: 162.6 cm (64.02\").    Weight as of this encounter: 79.8 kg (176 lb).            Physical Exam  Vitals reviewed.   Constitutional:       Appearance: Normal appearance. She is obese. She is ill-appearing.   HENT:      Head: Normocephalic and atraumatic.      Ears:      Comments: Cloudiness noted bilateral ears      Nose: Nose normal.      Comments: Raspy voice      Mouth/Throat:      Mouth: Mucous membranes are moist.      Pharynx: Oropharynx is clear.   Eyes:      Extraocular Movements: Extraocular movements intact.      Conjunctiva/sclera: Conjunctivae normal.   Cardiovascular: "      Rate and Rhythm: Normal rate and regular rhythm.      Pulses: Normal pulses.      Heart sounds: Normal heart sounds.      Comments: S1, S2 audible  Pulmonary:      Effort: Pulmonary effort is normal.      Breath sounds: Normal breath sounds.      Comments: On room air   Abdominal:      General: Abdomen is flat.      Palpations: Abdomen is soft.   Musculoskeletal:         General: Normal range of motion.      Cervical back: Normal range of motion.   Skin:     General: Skin is warm and dry.   Neurological:      General: No focal deficit present.      Mental Status: She is alert and oriented to person, place, and time. Mental status is at baseline.   Psychiatric:         Mood and Affect: Mood normal.         Behavior: Behavior normal.         Thought Content: Thought content normal.         Judgment: Judgment normal.                Physical Exam   Result Review :             Procedures       Assessment and Plan     Diagnoses and all orders for this visit:    1. Sore throat (Primary)  -     POC Rapid Strep A    2. Chills  -     POCT SARS-CoV-2 + Flu Antigen JAY    3. Laryngitis  -     POCT SARS-CoV-2 + Flu Antigen JAY    4. Nonintractable headache, unspecified chronicity pattern, unspecified headache type  -     POCT SARS-CoV-2 + Flu Antigen JAY    5. Acute laryngitis  Assessment & Plan:  Sore throat- Reports she woke up Monday with a sore throat and then started to lose her voice. She has had chills and hot flashes. She did have a fever of 99.0 at home. Denies body aches, cough, nausea, congestion, ear pain, or vomiting. She has had a headache. She took tylenol PM last night and was able to sleep last night. She reports she was at a craft show on Saturday.     Encourage hydration   Prescibed omnicef and Paxlovid     COVID/FLU- positive     Strep-negative      6. Lab test positive for detection of COVID-19 virus  Assessment & Plan:  Encourage to stay in quarantine for 5 days  Prescribed Paxlovid   Encourage to stay  hydrated       Other orders  -     cefdinir (OMNICEF) 300 MG capsule; Take 1 capsule by mouth 2 (Two) Times a Day.  Dispense: 14 capsule; Refill: 0  -     Nirmatrelvir & Ritonavir, 300mg/100mg, (PAXLOVID) 20 x 150 MG & 10 x 100MG tablet therapy pack tablet; Take 2 tablets by mouth 2 (Two) Times a Day.  Dispense: 20 tablet; Refill: 0              Follow Up   Return if symptoms worsen or fail to improve.   Patient was given instructions and counseling regarding her condition or for health maintenance advice. Please see specific information pulled into the AVS if appropriate.

## 2023-12-05 NOTE — ASSESSMENT & PLAN NOTE
Sore throat- Reports she woke up Monday with a sore throat and then started to lose her voice. She has had chills and hot flashes. She did have a fever of 99.0 at home. Denies body aches, cough, nausea, congestion, ear pain, or vomiting. She has had a headache. She took tylenol PM last night and was able to sleep last night. She reports she was at a [employer] show on Saturday.     Encourage hydration   Prescibed omnicef and Paxlovid     COVID/FLU- positive     Strep-negative

## 2024-01-30 ENCOUNTER — TELEPHONE (OUTPATIENT)
Dept: ONCOLOGY | Facility: CLINIC | Age: 79
End: 2024-01-30
Payer: MEDICARE

## 2024-01-30 NOTE — TELEPHONE ENCOUNTER
Caller: Sandra Gonzalez    Relationship: Self    Best call back number: [phone]    What is the best time to reach you: ANY    Who are you requesting to speak with (clinical staff, provider,  specific staff member): SCHEDULING        What was the call regarding: PATIENT CALLED TO CANCEL FUTURE LAB APPT. PATIENT SAYS DR MENDOZA HAS ORDERED EXTENSIVE LAB WORK    Is it okay if the provider responds through MyChart: NO

## 2024-01-30 NOTE — TELEPHONE ENCOUNTER
PATIENT CALLED TO CANCEL FUTURE LAB APPT. PATIENT SAYS DR MENDOZA HAS ORDERED EXTENSIVE LAB WORK

## 2024-02-19 ENCOUNTER — CLINICAL SUPPORT (OUTPATIENT)
Dept: FAMILY MEDICINE CLINIC | Facility: CLINIC | Age: 79
End: 2024-02-19
Payer: MEDICARE

## 2024-02-19 DIAGNOSIS — I10 PRIMARY HYPERTENSION: ICD-10-CM

## 2024-02-19 DIAGNOSIS — D64.9 ANEMIA, UNSPECIFIED TYPE: ICD-10-CM

## 2024-02-19 DIAGNOSIS — R79.89 ELEVATED FERRITIN LEVEL: ICD-10-CM

## 2024-02-19 DIAGNOSIS — E78.2 MIXED HYPERLIPIDEMIA: ICD-10-CM

## 2024-02-19 LAB
ALBUMIN SERPL-MCNC: 4.5 G/DL (ref 3.5–5.2)
ALBUMIN/GLOB SERPL: 1.8 G/DL
ALP SERPL-CCNC: 60 U/L (ref 39–117)
ALT SERPL W P-5'-P-CCNC: 30 U/L (ref 1–33)
ANION GAP SERPL CALCULATED.3IONS-SCNC: 11 MMOL/L (ref 5–15)
AST SERPL-CCNC: 24 U/L (ref 1–32)
BASOPHILS # BLD AUTO: 0 10*3/MM3 (ref 0–0.2)
BASOPHILS NFR BLD AUTO: 0.8 % (ref 0–1.5)
BILIRUB SERPL-MCNC: 0.4 MG/DL (ref 0–1.2)
BUN SERPL-MCNC: 29 MG/DL (ref 8–23)
BUN/CREAT SERPL: 27.9 (ref 7–25)
CALCIUM SPEC-SCNC: 9.5 MG/DL (ref 8.6–10.5)
CHLORIDE SERPL-SCNC: 108 MMOL/L (ref 98–107)
CO2 SERPL-SCNC: 27 MMOL/L (ref 22–29)
CREAT SERPL-MCNC: 1.04 MG/DL (ref 0.57–1)
DEPRECATED RDW RBC AUTO: 48.1 FL (ref 37–54)
EGFRCR SERPLBLD CKD-EPI 2021: 55.8 ML/MIN/1.73
EOSINOPHIL # BLD AUTO: 0.1 10*3/MM3 (ref 0–0.4)
EOSINOPHIL NFR BLD AUTO: 2.6 % (ref 0.3–6.2)
ERYTHROCYTE [DISTWIDTH] IN BLOOD BY AUTOMATED COUNT: 14 % (ref 12.3–15.4)
FERRITIN SERPL-MCNC: 215.4 NG/ML (ref 13–150)
GLOBULIN UR ELPH-MCNC: 2.5 GM/DL
GLUCOSE SERPL-MCNC: 90 MG/DL (ref 65–99)
HCT VFR BLD AUTO: 41.2 % (ref 34–46.6)
HGB BLD-MCNC: 13.3 G/DL (ref 12–15.9)
IRON 24H UR-MRATE: 68 MCG/DL (ref 37–145)
IRON SATN MFR SERPL: 19 % (ref 20–50)
LYMPHOCYTES # BLD AUTO: 1.8 10*3/MM3 (ref 0.7–3.1)
LYMPHOCYTES NFR BLD AUTO: 30.6 % (ref 19.6–45.3)
MCH RBC QN AUTO: 29.9 PG (ref 26.6–33)
MCHC RBC AUTO-ENTMCNC: 32.3 G/DL (ref 31.5–35.7)
MCV RBC AUTO: 92.9 FL (ref 79–97)
MONOCYTES # BLD AUTO: 0.5 10*3/MM3 (ref 0.1–0.9)
MONOCYTES NFR BLD AUTO: 7.8 % (ref 5–12)
NEUTROPHILS NFR BLD AUTO: 3.4 10*3/MM3 (ref 1.7–7)
NEUTROPHILS NFR BLD AUTO: 58.2 % (ref 42.7–76)
NRBC BLD AUTO-RTO: 0.1 /100 WBC (ref 0–0.2)
PLATELET # BLD AUTO: 213 10*3/MM3 (ref 140–450)
PMV BLD AUTO: 9.3 FL (ref 6–12)
POTASSIUM SERPL-SCNC: 5.1 MMOL/L (ref 3.5–5.2)
PROT SERPL-MCNC: 7 G/DL (ref 6–8.5)
RBC # BLD AUTO: 4.44 10*6/MM3 (ref 3.77–5.28)
SODIUM SERPL-SCNC: 146 MMOL/L (ref 136–145)
TIBC SERPL-MCNC: 364 MCG/DL (ref 298–536)
TRANSFERRIN SERPL-MCNC: 244 MG/DL (ref 200–360)
WBC NRBC COR # BLD AUTO: 5.8 10*3/MM3 (ref 3.4–10.8)

## 2024-02-19 PROCEDURE — 80061 LIPID PANEL: CPT | Performed by: FAMILY MEDICINE

## 2024-02-19 PROCEDURE — 80053 COMPREHEN METABOLIC PANEL: CPT | Performed by: INTERNAL MEDICINE

## 2024-02-19 PROCEDURE — 85025 COMPLETE CBC W/AUTO DIFF WBC: CPT | Performed by: INTERNAL MEDICINE

## 2024-02-19 PROCEDURE — 84466 ASSAY OF TRANSFERRIN: CPT | Performed by: INTERNAL MEDICINE

## 2024-02-19 PROCEDURE — 82728 ASSAY OF FERRITIN: CPT | Performed by: INTERNAL MEDICINE

## 2024-02-19 PROCEDURE — 83540 ASSAY OF IRON: CPT | Performed by: INTERNAL MEDICINE

## 2024-02-19 PROCEDURE — 36415 COLL VENOUS BLD VENIPUNCTURE: CPT | Performed by: FAMILY MEDICINE

## 2024-02-19 NOTE — PROGRESS NOTES
Venipuncture performed in L ARM by RT Janie  with good hemostasis. Patient tolerated well. 02/19/24 Stefany Sandhu, RT

## 2024-02-20 LAB
CHOLEST SERPL-MCNC: 225 MG/DL (ref 0–200)
HDLC SERPL-MCNC: 66 MG/DL (ref 40–60)
LDLC SERPL CALC-MCNC: 149 MG/DL (ref 0–100)
LDLC/HDLC SERPL: 2.24 {RATIO}
TRIGL SERPL-MCNC: 56 MG/DL (ref 0–150)
VLDLC SERPL-MCNC: 10 MG/DL (ref 5–40)

## 2024-02-21 ENCOUNTER — OFFICE VISIT (OUTPATIENT)
Dept: FAMILY MEDICINE CLINIC | Facility: CLINIC | Age: 79
End: 2024-02-21
Payer: MEDICARE

## 2024-02-21 VITALS
WEIGHT: 183 LBS | SYSTOLIC BLOOD PRESSURE: 142 MMHG | RESPIRATION RATE: 12 BRPM | BODY MASS INDEX: 31.24 KG/M2 | TEMPERATURE: 97.1 F | DIASTOLIC BLOOD PRESSURE: 68 MMHG | HEIGHT: 64 IN | OXYGEN SATURATION: 98 % | HEART RATE: 85 BPM

## 2024-02-21 DIAGNOSIS — I10 PRIMARY HYPERTENSION: Primary | ICD-10-CM

## 2024-02-21 DIAGNOSIS — N28.9 RENAL INSUFFICIENCY: ICD-10-CM

## 2024-02-21 DIAGNOSIS — E78.5 DYSLIPIDEMIA: ICD-10-CM

## 2024-02-21 PROCEDURE — 3078F DIAST BP <80 MM HG: CPT | Performed by: FAMILY MEDICINE

## 2024-02-21 PROCEDURE — 3077F SYST BP >= 140 MM HG: CPT | Performed by: FAMILY MEDICINE

## 2024-02-21 PROCEDURE — 99213 OFFICE O/P EST LOW 20 MIN: CPT | Performed by: FAMILY MEDICINE

## 2024-02-21 RX ORDER — HYDROXYZINE HYDROCHLORIDE 25 MG/1
25 TABLET, FILM COATED ORAL DAILY PRN
Qty: 40 TABLET | Refills: 0 | Status: SHIPPED | OUTPATIENT
Start: 2024-02-21

## 2024-02-21 RX ORDER — CHOLECALCIFEROL (VITAMIN D3) 50 MCG
2000 TABLET ORAL DAILY
COMMUNITY

## 2024-02-21 NOTE — PROGRESS NOTES
Subjective   Sandra Gonzalez is a 78 y.o. female.     Chief Complaint   Patient presents with    Results     Discuss lab results    Hypertension         Current Outpatient Medications:     amLODIPine (NORVASC) 5 MG tablet, TAKE 1 TABLET BY MOUTH EVERY DAY, Disp: 90 tablet, Rfl: 3    hydrOXYzine (ATARAX) 25 MG tablet, Take 1 tablet by mouth Daily As Needed for Itching., Disp: 40 tablet, Rfl: 0    loratadine (Claritin) 10 MG tablet, Take 1 tablet by mouth Daily., Disp: , Rfl:     triamcinolone (KENALOG) 0.1 % cream, Apply 1 application topically to the appropriate area as directed 2 (Two) Times a Day., Disp: 80 g, Rfl: 0    Biotin 5000 MCG chewable tablet, Chew 2 tablets Daily., Disp: , Rfl:     Cholecalciferol (Vitamin D) 50 MCG (2000 UT) tablet, Take 1 tablet by mouth Daily., Disp: , Rfl:     Past Medical History:   Diagnosis Date    Achilles tendinitis     Bladder Cancer     2016/ 2019 Bladder Tumor Sx----------Dr. Saldana     Cervical cancer     CHOL     DDD, lumbar     DEXA     2013 = (0.8/ -0.1)/ 2020= (-1.2/ -1.7)    Eczema     HL (hearing loss)     Wear hearing aids    HTN     Lower back pain     MAMMO     NEG = 2019/ 2020/ 2021/ 2022    Mitral valve prolapse     Parathyroid adenoma     s/p Sx    Pulmonary nodule     Renal Cell Cancer     S/P Left Nephrectomy    Rheumatic fever     Scoliosis     Vitamin D deficiency        Past Surgical History:   Procedure Laterality Date    ADENOIDECTOMY      APPENDECTOMY      BLADDER TUMOR EXCISION  05/2019    x 2    CHOLECYSTECTOMY      COLONOSCOPY      2018= NEG, rech 2028?         GSI    NEPHRECTOMY  09/2009    left nephrectomy    PARATHYROID GLAND SURGERY  12/2020    TONSILLECTOMY      TOTAL ABDOMINAL HYSTERECTOMY WITH SALPINGO OOPHORECTOMY         Family History   Problem Relation Age of Onset    Cancer Mother         She had gallbladder cancer, liver    Gallbladder disease Mother         CANCER    Cancer Father 77        Brain tumor - cancer    No Known Problems Sister      No Known Problems Brother        Social History     Socioeconomic History    Marital status:    Tobacco Use    Smoking status: Never     Passive exposure: Never    Smokeless tobacco: Never   Vaping Use    Vaping Use: Never used   Substance and Sexual Activity    Alcohol use: No    Drug use: No    Sexual activity: Yes     Partners: Male     Birth control/protection: None     Comment: I am 76 years old not worried about getting pregnant.       Hypertension  Pertinent negatives include no chest pain, palpitations or shortness of breath.   The patient is a 78-year-old female who is here for follow-up on hypertension and recent fasting labs.    She did not take her blood pressure medication this morning because she was rushing and had not eaten anything. She checks her blood pressure occasionally at home and it usually runs around 135 systolic to 138 systolic over 70 diastolic. She has not checked her blood pressure cuff. She denies any swelling in her ankles from the blood pressure medication. If she has been on her feet a lot, she will get red streaks. She does not wear compression hose. She notices it more in the summer. It is not painful or pruritic. She just remodeled her house from 12/20/2023 to 01/20/2024. She got a lot of exercise during that time. She walks for exercise. She has a 3-story house, so she is up and down the steps all the time. She does not do formal regular exercise.     She sees Dr. Saldana, urologist, every year. She last saw her in 2024. She had a scope of her bladder in 2023.     She canceled her appointment with Dr. Hall because she needs laboratory studies and had high ferritin and calcium levels. Her nails are thin and break easily.     She had to have surgery for her hyperparathyroidism. She sees Dr. Oakes, cardiologist once a year. She sees Dr. Sibley, dermatologist. She does not see an eye clinician.     She had renal cancer, bladder cancer twice, and cervical cancer. She did  not do genetic testing.    She drinks water. She rarely drinks soda or other beverages.     She is a non-smoker, no alcohol, no drugs.    Her mother had gallbladder cancer. Her 4 sisters and brother are still living. They do not have any medical problems.    She takes amlodipine 5 mg, hydroxyzine as needed for itching, Claritin as needed, 2 biotin gummies, and vitamin D. She took red yeast rice for a while 3 years ago without side effects. She has not taken any iron supplements.     She has received 1 COVID-19 booster. She did not receive an influenza vaccine because she had COVID-19 this fall. She also had to cancel her mammogram and bone density scan due to having COVID-19.     Laboratory Studies  Labs were reviewed with the patient.    Imaging  CT scan of the chest from 2023 was reviewed with the patient.    The following portions of the patient's history were reviewed and updated as appropriate: allergies, current medications, past family history, past medical history, past social history, past surgical history and problem list.    Review of Systems   Constitutional:  Negative for activity change, appetite change, fatigue, unexpected weight gain and unexpected weight loss.   Respiratory:  Negative for cough, chest tightness and shortness of breath.    Cardiovascular:  Negative for chest pain, palpitations and leg swelling.   Gastrointestinal:  Negative for constipation, diarrhea, nausea and vomiting.   Genitourinary:  Negative for frequency, urgency and urinary incontinence.   Musculoskeletal:  Negative for arthralgias and myalgias.   Neurological:  Negative for dizziness, facial asymmetry, speech difficulty, weakness, light-headedness, headache, memory problem and confusion.   Psychiatric/Behavioral:  Negative for sleep disturbance. The patient is not nervous/anxious.        Vitals:    02/21/24 0808   BP: 142/68   Pulse: 85   Resp: 12   Temp: 97.1 °F (36.2 °C)   SpO2: 98%       Objective   Physical Exam  Vitals  and nursing note reviewed.   Constitutional:       General: She is not in acute distress.     Appearance: She is well-developed. She is not ill-appearing or toxic-appearing.   HENT:      Head: Normocephalic and atraumatic.   Neck:      Vascular: No carotid bruit.   Cardiovascular:      Rate and Rhythm: Normal rate and regular rhythm.      Heart sounds: Normal heart sounds. No murmur heard.  Pulmonary:      Effort: Pulmonary effort is normal. No respiratory distress.      Breath sounds: Normal breath sounds. No stridor. No wheezing, rhonchi or rales.   Skin:     General: Skin is warm and dry.      Findings: No rash.   Neurological:      Mental Status: She is alert and oriented to person, place, and time.      Cranial Nerves: No cranial nerve deficit.   Psychiatric:         Attention and Perception: Attention and perception normal.         Mood and Affect: Mood and affect normal.         Speech: Speech normal.         Behavior: Behavior normal. Behavior is cooperative.         Thought Content: Thought content normal.         Cognition and Memory: Cognition and memory normal.         Judgment: Judgment normal.         BMI is >= 30 and <35. (Class 1 Obesity). The following options were offered after discussion;: exercise counseling/recommendations and nutrition counseling/recommendations      Assessment & Plan   Diagnoses and all orders for this visit:    1. Primary hypertension (Primary)    2. Dyslipidemia    3. Renal insufficiency  -     Basic Metabolic Panel; Future    Other orders  -     hydrOXYzine (ATARAX) 25 MG tablet; Take 1 tablet by mouth Daily As Needed for Itching.  Dispense: 40 tablet; Refill: 0    Hypertension.  Her blood pressure is slightly elevated today.   She will take her blood pressure medication before her next visit.   She will bring her blood pressure cuff to her next visit.    Dry skin.  I will refill her hydroxyzine.    Elevated cholesterol.  Her HDL is over 60 mg/dL, but her LDL mg/dL is  elevated, but stable.   She was advised to eat lean meats like chicken, turkey, and fish.   She was advised to avoid eating sausage, pepperoni, salami, and fatty meats.   She was advised to exercise.    Elevated kidney function.  Her BUN is elevated.   I will recheck her kidney function in 03/2024.   She was advised to drink plenty of fluids.    Health maintenance.  She is not due for a colonoscopy until 2028.   She was advised to get the new shingles vaccine.   She can get the RSV vaccine at the pharmacy.   An order was placed for a mammogram and bone density scan.    Red streaks.  She was advised to wear compression hose or elevate her feet.    Follow-up  The patient will follow up in 3 to 4 weeks for a nurse blood pressure check and laboratory studies.     Transcribed from ambient dictation for Natalie Garcia DO by Nimo Fine.  02/21/24   09:43 EST    Patient or patient representative verbalized consent to the visit recording.  I have personally performed the services described in this document as transcribed by the above individual, and it is both accurate and complete.

## 2024-03-13 ENCOUNTER — CLINICAL SUPPORT (OUTPATIENT)
Dept: FAMILY MEDICINE CLINIC | Facility: CLINIC | Age: 79
End: 2024-03-13
Payer: MEDICARE

## 2024-03-13 VITALS — HEART RATE: 78 BPM | DIASTOLIC BLOOD PRESSURE: 82 MMHG | SYSTOLIC BLOOD PRESSURE: 156 MMHG

## 2024-03-13 DIAGNOSIS — N28.9 RENAL INSUFFICIENCY: ICD-10-CM

## 2024-03-13 PROCEDURE — 36415 COLL VENOUS BLD VENIPUNCTURE: CPT | Performed by: FAMILY MEDICINE

## 2024-03-13 PROCEDURE — 80048 BASIC METABOLIC PNL TOTAL CA: CPT | Performed by: FAMILY MEDICINE

## 2024-03-13 RX ORDER — AMLODIPINE BESYLATE 5 MG/1
7.5 TABLET ORAL DAILY
Qty: 135 TABLET | Refills: 0 | Status: SHIPPED | OUTPATIENT
Start: 2024-03-13 | End: 2024-03-15

## 2024-03-13 NOTE — PROGRESS NOTES
153/88 hr 79 - pt wrist cuff  157/78 hr 76 - pt arm cuff  156/82 hr 78 - manual p/ KO    Pt wants to know if she can stay on the same BP med, but just increase the dose as needed. Please advise thru mychart.      Venipuncture performed in L ARM by RT Janie  with good hemostasis. Patient tolerated well. 03/13/24 Stefany Sandhu, RT

## 2024-03-14 LAB
ANION GAP SERPL CALCULATED.3IONS-SCNC: 9.7 MMOL/L (ref 5–15)
BUN SERPL-MCNC: 20 MG/DL (ref 8–23)
BUN/CREAT SERPL: 19.8 (ref 7–25)
CALCIUM SPEC-SCNC: 9.3 MG/DL (ref 8.6–10.5)
CHLORIDE SERPL-SCNC: 110 MMOL/L (ref 98–107)
CO2 SERPL-SCNC: 25.3 MMOL/L (ref 22–29)
CREAT SERPL-MCNC: 1.01 MG/DL (ref 0.57–1)
EGFRCR SERPLBLD CKD-EPI 2021: 57.8 ML/MIN/1.73
GLUCOSE SERPL-MCNC: 81 MG/DL (ref 65–99)
POTASSIUM SERPL-SCNC: 5.2 MMOL/L (ref 3.5–5.2)
SODIUM SERPL-SCNC: 145 MMOL/L (ref 136–145)

## 2024-03-15 ENCOUNTER — HOSPITAL ENCOUNTER (OUTPATIENT)
Dept: BONE DENSITY | Facility: HOSPITAL | Age: 79
Discharge: HOME | End: 2024-03-15
Payer: MEDICARE

## 2024-03-15 ENCOUNTER — HOSPITAL ENCOUNTER (OUTPATIENT)
Dept: MAMMOGRAPHY | Facility: HOSPITAL | Age: 79
Discharge: HOME | End: 2024-03-15
Payer: MEDICARE

## 2024-03-15 DIAGNOSIS — Z12.31 ENCOUNTER FOR SCREENING MAMMOGRAM FOR MALIGNANT NEOPLASM OF BREAST: ICD-10-CM

## 2024-03-15 DIAGNOSIS — Z78.0 POSTMENOPAUSAL: ICD-10-CM

## 2024-03-15 PROCEDURE — 77080 DXA BONE DENSITY AXIAL: CPT

## 2024-03-15 PROCEDURE — 77063 BREAST TOMOSYNTHESIS BI: CPT

## 2024-03-15 PROCEDURE — 77067 SCR MAMMO BI INCL CAD: CPT

## 2024-03-15 RX ORDER — AMLODIPINE BESYLATE 2.5 MG/1
7.5 TABLET ORAL DAILY
Qty: 90 TABLET | Refills: 1 | Status: SHIPPED | OUTPATIENT
Start: 2024-03-15

## 2024-03-15 RX ORDER — AMLODIPINE BESYLATE 2.5 MG/1
7.5 TABLET ORAL DAILY
Qty: 90 TABLET | Refills: 1 | Status: SHIPPED | OUTPATIENT
Start: 2024-03-15 | End: 2024-03-15

## 2024-03-16 DIAGNOSIS — R92.8 ABNORMAL MAMMOGRAM OF RIGHT BREAST: Primary | ICD-10-CM

## 2024-03-29 ENCOUNTER — HOSPITAL ENCOUNTER (OUTPATIENT)
Dept: MAMMOGRAPHY | Facility: HOSPITAL | Age: 79
Discharge: HOME | End: 2024-03-29
Payer: MEDICARE

## 2024-03-29 ENCOUNTER — HOSPITAL ENCOUNTER (OUTPATIENT)
Dept: ULTRASOUND IMAGING | Facility: HOSPITAL | Age: 79
Discharge: HOME | End: 2024-03-29
Payer: MEDICARE

## 2024-03-29 DIAGNOSIS — R92.8 ABNORMAL MAMMOGRAM OF RIGHT BREAST: ICD-10-CM

## 2024-03-29 PROCEDURE — 77065 DX MAMMO INCL CAD UNI: CPT

## 2024-03-29 PROCEDURE — G0279 TOMOSYNTHESIS, MAMMO: HCPCS

## 2024-03-29 PROCEDURE — 76642 ULTRASOUND BREAST LIMITED: CPT

## 2024-04-01 ENCOUNTER — OFFICE VISIT (OUTPATIENT)
Dept: CARDIOLOGY | Facility: CLINIC | Age: 79
End: 2024-04-01
Payer: MEDICARE

## 2024-04-01 VITALS
SYSTOLIC BLOOD PRESSURE: 130 MMHG | HEART RATE: 72 BPM | DIASTOLIC BLOOD PRESSURE: 74 MMHG | WEIGHT: 180 LBS | BODY MASS INDEX: 30.73 KG/M2 | HEIGHT: 64 IN | OXYGEN SATURATION: 100 %

## 2024-04-01 DIAGNOSIS — I10 ESSENTIAL HYPERTENSION: Primary | ICD-10-CM

## 2024-04-01 DIAGNOSIS — I34.0 NONRHEUMATIC MITRAL VALVE REGURGITATION: ICD-10-CM

## 2024-04-01 DIAGNOSIS — E78.5 DYSLIPIDEMIA: ICD-10-CM

## 2024-04-01 PROCEDURE — 99213 OFFICE O/P EST LOW 20 MIN: CPT | Performed by: INTERNAL MEDICINE

## 2024-04-01 PROCEDURE — 3078F DIAST BP <80 MM HG: CPT | Performed by: INTERNAL MEDICINE

## 2024-04-01 PROCEDURE — 3075F SYST BP GE 130 - 139MM HG: CPT | Performed by: INTERNAL MEDICINE

## 2024-04-01 PROCEDURE — 1160F RVW MEDS BY RX/DR IN RCRD: CPT | Performed by: INTERNAL MEDICINE

## 2024-04-01 PROCEDURE — 1159F MED LIST DOCD IN RCRD: CPT | Performed by: INTERNAL MEDICINE

## 2024-04-01 NOTE — PROGRESS NOTES
"    Subjective:     Encounter Date:04/01/2024      Patient ID: Sandra Gonzalez is a 78 y.o. female.    Chief Complaint:  History of Present Illness 78-year-old white female with history of mitral regurgitation hypertension hyperlipidemia presents to my office for a follow-up.  Patient is currently still without any symptoms of chest pain or shortness of breath at rest on exertion.  No complaint of any PND or orthopnea.  No palpitation dizziness syncope or swelling of the feet.  Patient is taking all her medicines regularly.  Patient does not smoke.    The following portions of the patient's history were reviewed and updated as appropriate: allergies, current medications, past family history, past medical history, past social history, past surgical history, and problem list.  Past Medical History:   Diagnosis Date    Achilles tendinitis     Bladder Cancer     2016/ 2019 Bladder Tumor Sx----------Dr. Saldana     Cervical cancer     CHOL     DDD, lumbar     DEXA     2013 = (0.8/ -0.1)/ 2020= (-1.2/ -1.7)    Eczema     HL (hearing loss)     Wear hearing aids    HTN     Lower back pain     MAMMO     NEG = 2019/ 2020/ 2021/ 2022/ 2024    Mitral valve prolapse     Parathyroid adenoma     s/p Sx    Pulmonary nodule     Renal Cell Cancer     S/P Left Nephrectomy    Rheumatic fever     Scoliosis     Vitamin D deficiency      Past Surgical History:   Procedure Laterality Date    ADENOIDECTOMY      APPENDECTOMY      BLADDER TUMOR EXCISION  05/2019    x 2    CHOLECYSTECTOMY      COLONOSCOPY      2018= NEG, rech 2028?         GSI    NEPHRECTOMY  09/2009    left nephrectomy    PARATHYROID GLAND SURGERY  12/2020    TONSILLECTOMY      TOTAL ABDOMINAL HYSTERECTOMY WITH SALPINGO OOPHORECTOMY       /74   Pulse 72   Ht 162.6 cm (64.02\")   Wt 81.6 kg (180 lb)   LMP 03/10/1990 (LMP Unknown)   SpO2 100%   BMI 30.88 kg/m²   Family History   Problem Relation Age of Onset    Cancer Mother         She had gallbladder cancer, liver    " Gallbladder disease Mother         CANCER    Cancer Father 77        Brain tumor - cancer    No Known Problems Sister     No Known Problems Brother        Current Outpatient Medications:     amLODIPine (NORVASC) 2.5 MG tablet, Take 3 tablets by mouth Daily., Disp: 90 tablet, Rfl: 1    Biotin 5000 MCG chewable tablet, Chew 2 tablets Daily., Disp: , Rfl:     hydrOXYzine (ATARAX) 25 MG tablet, Take 1 tablet by mouth Daily As Needed for Itching., Disp: 40 tablet, Rfl: 0    loratadine (Claritin) 10 MG tablet, Take 1 tablet by mouth Daily., Disp: , Rfl:     triamcinolone (KENALOG) 0.1 % cream, Apply 1 application topically to the appropriate area as directed 2 (Two) Times a Day., Disp: 80 g, Rfl: 0    Cholecalciferol (Vitamin D) 50 MCG (2000 UT) tablet, Take 1 tablet by mouth Daily. (Patient not taking: Reported on 4/1/2024), Disp: , Rfl:   Allergies   Allergen Reactions    Benzocaine Rash    Powders Rash     Basalm Of [place]inger    Amoxicillin-Pot Clavulanate Hives and Itching    Oxycodone Nausea And Vomiting     Social History     Socioeconomic History    Marital status:    Tobacco Use    Smoking status: Never     Passive exposure: Never    Smokeless tobacco: Never   Vaping Use    Vaping status: Never Used   Substance and Sexual Activity    Alcohol use: No    Drug use: No    Sexual activity: Yes     Partners: Male     Birth control/protection: None     Comment: I am 76 years old not worried about getting pregnant.     Review of Systems   Constitutional: Negative for malaise/fatigue.   Cardiovascular:  Negative for chest pain, dyspnea on exertion, leg swelling and palpitations.   Respiratory:  Negative for cough and shortness of breath.    Gastrointestinal:  Negative for abdominal pain, nausea and vomiting.   Neurological:  Negative for dizziness, focal weakness, headaches, light-headedness and numbness.   All other systems reviewed and are negative.             Objective:     Constitutional:       Appearance:  Well-developed.   Eyes:      General: No scleral icterus.     Conjunctiva/sclera: Conjunctivae normal.   HENT:      Head: Normocephalic and atraumatic.   Neck:      Vascular: No carotid bruit or JVD.   Pulmonary:      Effort: Pulmonary effort is normal.      Breath sounds: Normal breath sounds. No wheezing. No rales.   Cardiovascular:      Normal rate. Regular rhythm.   Pulses:     Intact distal pulses.   Abdominal:      General: Bowel sounds are normal.      Palpations: Abdomen is soft.   Musculoskeletal:      Cervical back: Normal range of motion and neck supple. Skin:     General: Skin is warm and dry.      Findings: No rash.   Neurological:      Mental Status: Alert.       Procedures    Lab Review:         MDM    #1 mitral regurgitation  Patient has mild mitral regurgitation and is currently stable on medications with amlodipine  2.  Hypertension  Patient blood pressure currently stable on amlodipine  3.  Hyperlipidemia  Plans on over-the-counter medicines and followed by primary care doctor.    Patient's previous medical records, labs, and EKG were reviewed and discussed with the patient at today's visit.

## 2024-05-20 RX ORDER — AMLODIPINE BESYLATE 2.5 MG/1
7.5 TABLET ORAL DAILY
Qty: 90 TABLET | Refills: 0 | Status: SHIPPED | OUTPATIENT
Start: 2024-05-20

## 2024-05-20 NOTE — TELEPHONE ENCOUNTER
Rx Refill Note  Requested Prescriptions     Pending Prescriptions Disp Refills    amLODIPine (NORVASC) 2.5 MG tablet [Pharmacy Med Name: amLODIPine Besylate Oral Tablet 2.5 MG] 90 tablet 0     Sig: TAKE 3 TABLETS BY MOUTH EVERY DAY      Last office visit with prescribing clinician: 2/21/2024   Last telemedicine visit with prescribing clinician: Visit date not found   Next office visit with prescribing clinician: Visit date not found     Basic Metabolic Panel (03/13/2024 10:11)   Lipid Panel (02/19/2024 08:08)                       Would you like a call back once the refill request has been completed: [] Yes [] No    If the office needs to give you a call back, can they leave a voicemail: [] Yes [] No    Tosha Reyes CMA  05/20/24, 16:31 EDT

## 2024-05-31 ENCOUNTER — PATIENT MESSAGE (OUTPATIENT)
Dept: FAMILY MEDICINE CLINIC | Facility: CLINIC | Age: 79
End: 2024-05-31
Payer: MEDICARE

## 2024-06-12 RX ORDER — AMLODIPINE BESYLATE 2.5 MG/1
7.5 TABLET ORAL DAILY
Qty: 90 TABLET | Refills: 1 | Status: SHIPPED | OUTPATIENT
Start: 2024-06-12

## 2024-08-15 RX ORDER — AMLODIPINE BESYLATE 2.5 MG/1
7.5 TABLET ORAL DAILY
Qty: 90 TABLET | Refills: 0 | Status: SHIPPED | OUTPATIENT
Start: 2024-08-15

## 2024-09-09 RX ORDER — AMLODIPINE BESYLATE 2.5 MG/1
7.5 TABLET ORAL DAILY
Qty: 90 TABLET | Refills: 0 | Status: SHIPPED | OUTPATIENT
Start: 2024-09-09

## 2024-10-03 ENCOUNTER — FLU SHOT (OUTPATIENT)
Dept: FAMILY MEDICINE CLINIC | Facility: CLINIC | Age: 79
End: 2024-10-03
Payer: MEDICARE

## 2024-10-03 DIAGNOSIS — Z23 NEED FOR INFLUENZA VACCINATION: Primary | ICD-10-CM

## 2024-10-03 PROCEDURE — G0008 ADMIN INFLUENZA VIRUS VAC: HCPCS | Performed by: FAMILY MEDICINE

## 2024-10-03 PROCEDURE — 90662 IIV NO PRSV INCREASED AG IM: CPT | Performed by: FAMILY MEDICINE

## 2024-10-07 RX ORDER — AMLODIPINE BESYLATE 2.5 MG/1
7.5 TABLET ORAL DAILY
Qty: 90 TABLET | Refills: 1 | Status: SHIPPED | OUTPATIENT
Start: 2024-10-07

## 2024-12-16 NOTE — TELEPHONE ENCOUNTER
Rx Refill Note  Requested Prescriptions     Pending Prescriptions Disp Refills    amLODIPine (NORVASC) 2.5 MG tablet [Pharmacy Med Name: amLODIPine Besylate Oral Tablet 2.5 MG] 90 tablet 0     Sig: TAKE 3 TABLETS BY MOUTH EVERY DAY      Last office visit with prescribing clinician: 2/21/2024   Last telemedicine visit with prescribing clinician: Visit date not found   Next office visit with prescribing clinician: Visit date not found        Lipid Panel (02/19/2024 08:08)                  Would you like a call back once the refill request has been completed: [] Yes [] No    If the office needs to give you a call back, can they leave a voicemail: [] Yes [] No    Stefany Sandhu, RT  12/16/24, 09:44 EST

## 2024-12-17 RX ORDER — AMLODIPINE BESYLATE 2.5 MG/1
7.5 TABLET ORAL DAILY
Qty: 90 TABLET | Refills: 0 | Status: SHIPPED | OUTPATIENT
Start: 2024-12-17

## 2025-01-08 ENCOUNTER — OFFICE VISIT (OUTPATIENT)
Dept: FAMILY MEDICINE CLINIC | Facility: CLINIC | Age: 80
End: 2025-01-08
Payer: MEDICARE

## 2025-01-08 VITALS
HEIGHT: 64 IN | TEMPERATURE: 98.4 F | RESPIRATION RATE: 18 BRPM | HEART RATE: 94 BPM | OXYGEN SATURATION: 99 % | BODY MASS INDEX: 30.73 KG/M2 | WEIGHT: 180 LBS | DIASTOLIC BLOOD PRESSURE: 72 MMHG | SYSTOLIC BLOOD PRESSURE: 111 MMHG

## 2025-01-08 DIAGNOSIS — I10 PRIMARY HYPERTENSION: ICD-10-CM

## 2025-01-08 DIAGNOSIS — J06.9 URI, ACUTE: Primary | ICD-10-CM

## 2025-01-08 LAB
EXPIRATION DATE: NORMAL
FLUAV AG UPPER RESP QL IA.RAPID: NOT DETECTED
FLUBV AG UPPER RESP QL IA.RAPID: NOT DETECTED
INTERNAL CONTROL: NORMAL
Lab: NORMAL
SARS-COV-2 AG UPPER RESP QL IA.RAPID: NOT DETECTED

## 2025-01-08 PROCEDURE — 1126F AMNT PAIN NOTED NONE PRSNT: CPT | Performed by: FAMILY MEDICINE

## 2025-01-08 PROCEDURE — 99213 OFFICE O/P EST LOW 20 MIN: CPT | Performed by: FAMILY MEDICINE

## 2025-01-08 PROCEDURE — 1159F MED LIST DOCD IN RCRD: CPT | Performed by: FAMILY MEDICINE

## 2025-01-08 PROCEDURE — 3078F DIAST BP <80 MM HG: CPT | Performed by: FAMILY MEDICINE

## 2025-01-08 PROCEDURE — 3074F SYST BP LT 130 MM HG: CPT | Performed by: FAMILY MEDICINE

## 2025-01-08 PROCEDURE — 1160F RVW MEDS BY RX/DR IN RCRD: CPT | Performed by: FAMILY MEDICINE

## 2025-01-08 PROCEDURE — 87428 SARSCOV & INF VIR A&B AG IA: CPT | Performed by: FAMILY MEDICINE

## 2025-01-08 NOTE — PROGRESS NOTES
Subjective   Sandra Gonzalez is a 79 y.o. female.     Chief Complaint   Patient presents with    Sore Throat     Patient complains of a sore throat and losing her voice. Very little coughing. All started yesterday.          Current Outpatient Medications:     amLODIPine (NORVASC) 2.5 MG tablet, TAKE 3 TABLETS BY MOUTH EVERY DAY, Disp: 90 tablet, Rfl: 0    hydrOXYzine (ATARAX) 25 MG tablet, Take 1 tablet by mouth Daily As Needed for Itching., Disp: 40 tablet, Rfl: 0    loratadine (Claritin) 10 MG tablet, Take 1 tablet by mouth Daily., Disp: , Rfl:     triamcinolone (KENALOG) 0.1 % cream, Apply 1 application topically to the appropriate area as directed 2 (Two) Times a Day., Disp: 80 g, Rfl: 0    Past Medical History:   Diagnosis Date    Achilles tendinitis     Bladder Cancer     2016/ 2019 Bladder Tumor Sx----------Dr. Saldana     Cervical cancer     CHOL     DDD, lumbar     DEXA     2013 = (0.8/ -0.1)/ 2020= (-1.2/ -1.7); 2024= (-1.1/ 1.2)    Eczema     HL (hearing loss)     Wear hearing aids    HTN     Lower back pain     MAMMO     NEG = 2019/ 2020/ 2021/ 2022/ 2024    Mitral valve prolapse     Parathyroid adenoma     s/p Sx    Pulmonary nodule     Renal Cell Cancer     S/P Left Nephrectomy    Rheumatic fever     Scoliosis     Vitamin D deficiency        Past Surgical History:   Procedure Laterality Date    ADENOIDECTOMY      APPENDECTOMY      BLADDER TUMOR EXCISION  05/2019    x 2    CHOLECYSTECTOMY      COLONOSCOPY      2018= NEG, rech 2028?         GSI    NEPHRECTOMY  09/2009    left nephrectomy    PARATHYROID GLAND SURGERY  12/2020    TONSILLECTOMY      TOTAL ABDOMINAL HYSTERECTOMY WITH SALPINGO OOPHORECTOMY         Family History   Problem Relation Age of Onset    Cancer Mother         She had gallbladder cancer, liver    Gallbladder disease Mother         CANCER    Cancer Father 77        Brain tumor - cancer    No Known Problems Sister     No Known Problems Brother        Social History     Socioeconomic  History    Marital status:    Tobacco Use    Smoking status: Never     Passive exposure: Never    Smokeless tobacco: Never   Vaping Use    Vaping status: Never Used   Substance and Sexual Activity    Alcohol use: No    Drug use: No    Sexual activity: Yes     Partners: Male     Birth control/protection: None     Comment: I am 76 years old not worried about getting pregnant.       History of Present Illness  The patient is a 79-year-old female who presents with complaints of sore throat and loss of voice for 2 days.    She reports a decrease in her blood pressure, which she attributes to a weight loss of approximately 19 pounds. She does not experience any episodes of lightheadedness upon transitioning from a seated to standing position. She has been prescribed amlodipine 7.5 mg Qday. She is capable of monitoring her blood pressure at home using an accurate cuff and may cut med dose to 5mg qday    She has been experiencing a sore throat since yesterday, for which she has not sought any specific treatment. She has been self-medicating with zinc tablets, one each yesterday and today. Additionally, she took a single dose of Sudafed the day before yesterday. She reports no recent illnesses in her immediate family. She describes a sensation of congestion in her head. She believes the drainage is causing her laryngitis/ sore throat.    She has successfully lost weight through the Weight Watchers program, which she initiated in June 2024. Her current weight is 161 pounds, a weight she is comfortable maintaining.    SOCIAL HISTORY  She is a non-smoker, does not consume alcohol, and does not use drugs.    FAMILY HISTORY  She reports no family history of medical problems.    ALLERGIES  She is allergic to BENZOCAINE, POWDERS, AUGMENTIN, and PERCOCET or OXYCODONE.    MEDICATIONS  Current: Amlodipine 7.5 mg, hydroxyzine 25 mg as needed for itching, Claritin daily, and steroid cream as needed.    IMMUNIZATIONS  She received  the influenza vaccine and shingles vaccine. She did not receive the COVID-19 booster.        The following portions of the patient's history were reviewed and updated as appropriate: allergies, current medications, past family history, past medical history, past social history, past surgical history and problem list.    Review of Systems   Constitutional:  Negative for activity change, appetite change, chills, fatigue, fever, unexpected weight gain and unexpected weight loss.   HENT:  Positive for sore throat and voice change. Negative for congestion, ear discharge, ear pain, postnasal drip, rhinorrhea, sinus pressure, sneezing and swollen glands.    Eyes:  Negative for pain, discharge, redness, itching and visual disturbance.   Respiratory:  Negative for cough, shortness of breath, wheezing and stridor.    Skin:  Negative for rash.   Allergic/Immunologic: Negative for environmental allergies.   Psychiatric/Behavioral:  Negative for sleep disturbance.        Vitals:    01/08/25 1109   BP: 111/72   Pulse: 94   Resp: 18   Temp: 98.4 °F (36.9 °C)   SpO2: 99%       Objective   Physical Exam  Vitals and nursing note reviewed.   Constitutional:       General: She is not in acute distress.     Appearance: She is well-developed. She is not ill-appearing, toxic-appearing or diaphoretic.   HENT:      Head: Normocephalic and atraumatic.      Right Ear: Tympanic membrane, ear canal and external ear normal. There is no impacted cerumen.      Left Ear: Tympanic membrane, ear canal and external ear normal. There is no impacted cerumen.      Nose: Nose normal. No congestion or rhinorrhea.      Mouth/Throat:      Mouth: Mucous membranes are moist.      Pharynx: Oropharynx is clear. No oropharyngeal exudate or posterior oropharyngeal erythema.   Eyes:      General: No scleral icterus.        Right eye: No discharge.         Left eye: No discharge.      Extraocular Movements: Extraocular movements intact.      Conjunctiva/sclera:  Conjunctivae normal.      Pupils: Pupils are equal, round, and reactive to light.   Neck:      Thyroid: No thyromegaly.   Cardiovascular:      Rate and Rhythm: Normal rate and regular rhythm.      Heart sounds: Normal heart sounds. No murmur heard.  Pulmonary:      Effort: Pulmonary effort is normal. No respiratory distress.      Breath sounds: Normal breath sounds. No wheezing, rhonchi or rales.   Musculoskeletal:      Cervical back: Normal range of motion and neck supple.   Lymphadenopathy:      Cervical: No cervical adenopathy.   Skin:     General: Skin is warm and dry.      Coloration: Skin is not pale.      Findings: No erythema or rash.   Neurological:      Mental Status: She is alert and oriented to person, place, and time.      Cranial Nerves: No cranial nerve deficit.   Psychiatric:         Attention and Perception: Attention normal.         Mood and Affect: Mood and affect normal.         Speech: Speech normal.         Behavior: Behavior normal. Behavior is cooperative.         Thought Content: Thought content normal.         Cognition and Memory: Cognition and memory normal.         Judgment: Judgment normal.       Physical Exam      Vital Signs  Blood pressure is normal. Weight is 161 pounds.            Assessment & Plan   Diagnoses and all orders for this visit:    1. URI, acute (Primary)  -     POCT SARS-CoV-2 Antigen JAY + Flu    2. Primary hypertension      Assessment & Plan  1. Hypertension.  Her blood pressure readings are within the normal range. She has been advised to reduce her amlodipine dosage to 5 mg, contingent upon her ability to monitor her blood pressure at home.    2. Pharyngitis.  Her symptoms suggest the initial stages of a common cold, which typically resolves within a week to 10 days. She has been counseled against the use of Sudafed due to its potential to elevate blood pressure. She has been advised to consider switching from Claritin to Allegra or Zyrtec. The use of Mucinex has  been recommended to facilitate drainage. She has also been informed about the availability of nasal steroid sprays such as Flonase or Nasacort, which may require regular use for optimal benefit. She has been provided with samples of Mucinex DM 12-hour and plain Mucinex 600 mg, with instructions to take 1 to 2 tablets every 12 hours. A sample of Zyrtec has also been given for trial if she decides to switch from Claritin. She has been instructed to contact the office if her symptoms worsen.    3. Weight management.  She is currently on Weight Watchers program and is comfortable with her current weight.    4. Health maintenance.  She has been advised to receive the RSV vaccine at the pharmacy.     Results            Patient or patient representative verbalized consent for the use of Ambient Listening during the visit with  Natalie Garcia DO for chart documentation. 1/8/2025  13:38 EST

## 2025-01-08 NOTE — PROGRESS NOTES
"Chief Complaint  No chief complaint on file.       Subjective    {Problem List  Visit Diagnosis   Encounters  Notes  Medications  Labs  Result Review Imaging  Media :23}      Sandra Gonzalez is a 79-year-old female who reports to the office today due to sore throat.    Sore throat-         Review of Systems     Objective   Vital Signs:   There were no vitals filed for this visit.   Estimated body mass index is 30.88 kg/m² as calculated from the following:    Height as of 4/1/24: 162.6 cm (64.02\").    Weight as of 4/1/24: 81.6 kg (180 lb).                  Patient screened positive for depression based on a PHQ-9 score of  on . Follow-up recommendations include: {depression follow-up plan:93433}.        Physical Exam  Vitals reviewed.   Constitutional:       Appearance: Normal appearance.   HENT:      Head: Normocephalic and atraumatic.      Nose: Nose normal.      Mouth/Throat:      Mouth: Mucous membranes are moist.      Pharynx: Oropharynx is clear.   Eyes:      Extraocular Movements: Extraocular movements intact.      Conjunctiva/sclera: Conjunctivae normal.      Pupils: Pupils are equal, round, and reactive to light.   Cardiovascular:      Rate and Rhythm: Normal rate and regular rhythm.      Pulses: Normal pulses.      Heart sounds: Normal heart sounds.      Comments: S1, S2 audible  Pulmonary:      Effort: Pulmonary effort is normal.      Breath sounds: Normal breath sounds.      Comments: On room air   Abdominal:      General: Abdomen is flat. Bowel sounds are normal.      Palpations: Abdomen is soft.   Musculoskeletal:         General: Normal range of motion.      Cervical back: Normal range of motion.   Skin:     General: Skin is warm and dry.   Neurological:      General: No focal deficit present.      Mental Status: She is alert and oriented to person, place, and time. Mental status is at baseline.   Psychiatric:         Mood and Affect: Mood normal.         Behavior: Behavior normal.         " Thought Content: Thought content normal.         Judgment: Judgment normal.                Physical Exam   Result Review :{Labs  Result Review  Imaging  Med Tab  Media  Procedures  :23}  {The following data was reviewed by (Optional):76753}  {Ambulatory Labs (Optional):85013}  {Data reviewed (Optional):09451:::1}       Procedures       Assessment and Plan {CC Problem List  Visit Diagnosis   ROS  Review (Popup)  Health Maintenance  Quality  BestPractice  Medications  SmartSets  SnapShot Encounters  Media :23}    There are no diagnoses linked to this encounter.        {Time Spent (Optional):87568}  Follow Up {Instructions Charge Capture  Follow-up Communications :23}  No follow-ups on file.   Patient was given instructions and counseling regarding her condition or for health maintenance advice. Please see specific information pulled into the AVS if appropriate.

## 2025-01-12 ENCOUNTER — HOSPITAL ENCOUNTER (OUTPATIENT)
Facility: HOSPITAL | Age: 80
Discharge: HOME | End: 2025-01-12
Attending: EMERGENCY MEDICINE | Admitting: EMERGENCY MEDICINE
Payer: MEDICARE

## 2025-01-12 VITALS
RESPIRATION RATE: 20 BRPM | SYSTOLIC BLOOD PRESSURE: 171 MMHG | TEMPERATURE: 98.7 F | HEART RATE: 112 BPM | BODY MASS INDEX: 28.2 KG/M2 | HEIGHT: 64 IN | OXYGEN SATURATION: 98 % | DIASTOLIC BLOOD PRESSURE: 73 MMHG | WEIGHT: 165.2 LBS

## 2025-01-12 DIAGNOSIS — S51.851A CAT BITE OF RIGHT FOREARM WITH INFECTION, INITIAL ENCOUNTER: Primary | ICD-10-CM
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
## 2025-01-12 DIAGNOSIS — L08.9 CAT BITE OF RIGHT FOREARM WITH INFECTION, INITIAL ENCOUNTER: Primary | ICD-10-CM

## 2025-01-12 PROCEDURE — 90715 TDAP VACCINE 7 YRS/> IM: CPT | Performed by: EMERGENCY MEDICINE

## 2025-01-12 PROCEDURE — 90471 IMMUNIZATION ADMIN: CPT | Performed by: EMERGENCY MEDICINE

## 2025-01-12 PROCEDURE — G0463 HOSPITAL OUTPT CLINIC VISIT: HCPCS | Performed by: EMERGENCY MEDICINE

## 2025-01-12 PROCEDURE — 25010000002 TETANUS-DIPHTH-ACELL PERTUSSIS 5-2.5-18.5 LF-MCG/0.5 SUSPENSION PREFILLED SYRINGE: Performed by: EMERGENCY MEDICINE

## 2025-01-12 RX ORDER — METRONIDAZOLE 500 MG/1
500 TABLET ORAL ONCE
Status: COMPLETED | OUTPATIENT
Start: 2025-01-12 | End: 2025-01-12

## 2025-01-12 RX ORDER — SULFAMETHOXAZOLE AND TRIMETHOPRIM 800; 160 MG/1; MG/1
1 TABLET ORAL 2 TIMES DAILY
Qty: 14 TABLET | Refills: 0 | Status: SHIPPED | OUTPATIENT
Start: 2025-01-12 | End: 2025-01-19

## 2025-01-12 RX ORDER — SULFAMETHOXAZOLE AND TRIMETHOPRIM 800; 160 MG/1; MG/1
1 TABLET ORAL ONCE
Status: COMPLETED | OUTPATIENT
Start: 2025-01-12 | End: 2025-01-12

## 2025-01-12 RX ORDER — METRONIDAZOLE 500 MG/1
500 TABLET ORAL 3 TIMES DAILY
Qty: 21 TABLET | Refills: 0 | Status: SHIPPED | OUTPATIENT
Start: 2025-01-12 | End: 2025-01-19

## 2025-01-12 RX ADMIN — TETANUS TOXOID, REDUCED DIPHTHERIA TOXOID AND ACELLULAR PERTUSSIS VACCINE, ADSORBED 0.5 ML: 5; 2.5; 8; 8; 2.5 SUSPENSION INTRAMUSCULAR at 09:41

## 2025-01-12 RX ADMIN — METRONIDAZOLE 500 MG: 500 TABLET ORAL at 10:17

## 2025-01-12 RX ADMIN — SULFAMETHOXAZOLE AND TRIMETHOPRIM 1 TABLET: 800; 160 TABLET ORAL at 10:17

## 2025-01-12 NOTE — FSED PROVIDER NOTE
Subjective   History of Present Illness  Patient is a 79-year-old female who presents emergency room with an infected cat bite to her right forearm.  Patient reports increased redness, swelling and pain since she was bit 4 days ago.  Patient states that this seemed to progress rapidly over the past 24 hours.  She is unsure of her last tetanus.  No other injury or trauma.  It is her domesticated kitten.        Review of Systems   Constitutional: Negative.  Negative for chills, fatigue and fever.   Eyes: Negative.    Respiratory:  Negative for cough, chest tightness and shortness of breath.    Cardiovascular:  Negative for chest pain and palpitations.   Gastrointestinal:  Negative for abdominal pain, diarrhea, nausea and vomiting.   Genitourinary: Negative.    Musculoskeletal: Negative.    Skin:  Positive for rash and wound.   Neurological: Negative.  Negative for syncope, weakness, numbness and headaches.   Psychiatric/Behavioral: Negative.     All other systems reviewed and are negative.      Past Medical History:   Diagnosis Date    Achilles tendinitis     Bladder Cancer     2016/ 2019 Bladder Tumor Sx----------Dr. Saldana     Cervical cancer     CHOL     DDD, lumbar     DEXA     2013 = (0.8/ -0.1)/ 2020= (-1.2/ -1.7); 2024= (-1.1/ 1.2)    Eczema     HL (hearing loss)     Wear hearing aids    HTN     Lower back pain     MAMMO     NEG = 2019/ 2020/ 2021/ 2022/ 2024    Mitral valve prolapse     Parathyroid adenoma     s/p Sx    Pulmonary nodule     Renal Cell Cancer     S/P Left Nephrectomy    Rheumatic fever     Scoliosis     Vitamin D deficiency        Allergies   Allergen Reactions    Benzocaine Rash    Calamine Rash     Basalm Of [place]    Powders Rash     Basalm Of [place]    Amoxicillin-Pot Clavulanate Hives and Itching    Oxycodone Nausea And Vomiting       Past Surgical History:   Procedure Laterality Date    ADENOIDECTOMY      APPENDECTOMY      BLADDER TUMOR EXCISION  05/2019    x 2    CHOLECYSTECTOMY       COLONOSCOPY      2018= NEG, rech 2028?         GSI    NEPHRECTOMY  09/2009    left nephrectomy    PARATHYROID GLAND SURGERY  12/2020    TONSILLECTOMY      TOTAL ABDOMINAL HYSTERECTOMY WITH SALPINGO OOPHORECTOMY         Family History   Problem Relation Age of Onset    Cancer Mother         She had gallbladder cancer, liver    Gallbladder disease Mother         CANCER    Cancer Father 77        Brain tumor - cancer    No Known Problems Sister     No Known Problems Brother        Social History     Socioeconomic History    Marital status:    Tobacco Use    Smoking status: Never     Passive exposure: Never    Smokeless tobacco: Never   Vaping Use    Vaping status: Never Used   Substance and Sexual Activity    Alcohol use: No    Drug use: No    Sexual activity: Yes     Partners: Male     Birth control/protection: None     Comment: I am 76 years old not worried about getting pregnant.           Objective   Physical Exam  Vitals and nursing note reviewed.   Constitutional:       General: She is not in acute distress.     Appearance: Normal appearance. She is normal weight.   HENT:      Right Ear: External ear normal.      Left Ear: External ear normal.      Nose: Nose normal.   Cardiovascular:      Rate and Rhythm: Normal rate.   Pulmonary:      Effort: Pulmonary effort is normal.   Musculoskeletal:         General: Normal range of motion.        Arms:       Cervical back: Normal range of motion.   Skin:     Capillary Refill: Capillary refill takes less than 2 seconds.   Neurological:      General: No focal deficit present.      Mental Status: She is alert and oriented to person, place, and time.   Psychiatric:         Mood and Affect: Mood normal.         Procedures           ED Course                                           Medical Decision Making  Patient presents to the emergency room with animal bite injuries.  See HPI for specifics regarding this injury.  Patient's wounds are examined.  Sutures are placed  when medically necessary, keeping in mind, that we are trying to keep these wounds open so that they can drain.  The patient's wounds are irrigated and dressed with antibiotic ointment here in the emergency department.  The wound shows surrounding erythema which is concerning for developing infection.  Patient is placed on oral antibiotics.  Tetanus updated.  The animal's vaccination status is up-to-date.      Problems Addressed:  Cat bite of right forearm with infection, initial encounter: complicated acute illness or injury    Risk  Prescription drug management.        Final diagnoses:   Cat bite of right forearm with infection, initial encounter       ED Disposition  ED Disposition       ED Disposition   Discharge    Condition   Stable    Comment   --               Natalie Garcia DO  [address]  [address]  [place] [address]  [phone]    Schedule an appointment as soon as possible for a visit in 1 week  For repeat evaluation         Medication List        New Prescriptions      metroNIDAZOLE 500 MG tablet  Commonly known as: FLAGYL  Take 1 tablet by mouth 3 (Three) Times a Day for 7 days.     sulfamethoxazole-trimethoprim 800-160 MG per tablet  Commonly known as: BACTRIM DS,SEPTRA DS  Take 1 tablet by mouth 2 (Two) Times a Day for 7 days.               Where to Get Your Medications        These medications were sent to [id number] - [place], [place] - [address] - [phone]  - [phone] FX  [address] [place] [address]      Phone: [phone]   metroNIDAZOLE 500 MG tablet  sulfamethoxazole-trimethoprim 800-160 MG per tablet

## 2025-01-12 NOTE — DISCHARGE INSTRUCTIONS
Take antibiotics as prescribed for your condition.  As discussed, please give it 48 hours to notice improvement in your lesion.  If it does not improve after 2 days, you may need admitted to the hospital for IV antibiotics.  Schedule follow-up appointment with your primary care physician.  Over-the-counter Tylenol as needed for pain.  Return to ER for any concerns.

## 2025-01-14 ENCOUNTER — OFFICE VISIT (OUTPATIENT)
Dept: FAMILY MEDICINE CLINIC | Facility: CLINIC | Age: 80
End: 2025-01-14
Payer: MEDICARE

## 2025-01-14 VITALS
SYSTOLIC BLOOD PRESSURE: 148 MMHG | BODY MASS INDEX: 28 KG/M2 | HEART RATE: 89 BPM | HEIGHT: 64 IN | OXYGEN SATURATION: 99 % | WEIGHT: 164 LBS | RESPIRATION RATE: 14 BRPM | TEMPERATURE: 98.4 F | DIASTOLIC BLOOD PRESSURE: 84 MMHG

## 2025-01-14 DIAGNOSIS — L29.9 PRURITUS: ICD-10-CM

## 2025-01-14 DIAGNOSIS — I10 ELEVATED BLOOD PRESSURE READING WITH DIAGNOSIS OF HYPERTENSION: ICD-10-CM
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
## 2025-01-14 PROCEDURE — 85025 COMPLETE CBC W/AUTO DIFF WBC: CPT | Performed by: FAMILY MEDICINE

## 2025-01-14 PROCEDURE — 86140 C-REACTIVE PROTEIN: CPT | Performed by: FAMILY MEDICINE

## 2025-01-14 PROCEDURE — 85652 RBC SED RATE AUTOMATED: CPT | Performed by: FAMILY MEDICINE

## 2025-01-14 RX ORDER — CETIRIZINE HYDROCHLORIDE 10 MG/1
10 TABLET ORAL 2 TIMES DAILY PRN
Qty: 60 TABLET | Refills: 0 | Status: SHIPPED | OUTPATIENT
Start: 2025-01-14

## 2025-01-14 NOTE — PROGRESS NOTES
Venipuncture performed in left arm by Tosha Reyes CMA  with good hemostasis. Patient tolerated well. 01/14/25 Tosha Reyes CMA

## 2025-01-14 NOTE — PROGRESS NOTES
Answers submitted by the patient for this visit:  Primary Reason for Visit (Submitted on 1/13/2025)  What is the primary reason for your visit?: Problem Not Listed  Problem not listed (Submitted on 1/13/2025)  Chief Complaint: Other medical problem  Reason for appointment: Cat bite infection . I saw urgent care sunday and he said follow eith with my dr on tuesday. It is a bite worse today i am on antibiotics  abdominal pain: No  anorexia: No  joint pain: No  change in stool: No  chest pain: No  chills: No  nasal congestion: No  cough: No  diaphoresis: No  fatigue: No  fever: No  headaches: No  joint swelling: No  myalgias: No  nausea: No  neck pain: No  numbness: No  rash: No  sore throat: No  swollen glands: No  dysuria: No  vertigo: No  visual change: No  vomiting: No  weakness: No  Other symptom: Arm where i was biten and got infection is very hot and swollen. It throbs and hurts.  Onset: in the past 7 days  Chronicity: new  Medications tried: Antibotics prescribed by urgent care doctor  Subjective   Sandra Gonzalez is a 79 y.o. female.     Chief Complaint   Patient presents with    Animal Bite     C follow up from a cat bite that is hot/red and getting worse.          Current Outpatient Medications:     amLODIPine (NORVASC) 2.5 MG tablet, TAKE 3 TABLETS BY MOUTH EVERY DAY, Disp: 90 tablet, Rfl: 0    hydrOXYzine (ATARAX) 25 MG tablet, Take 1 tablet by mouth Daily As Needed for Itching., Disp: 40 tablet, Rfl: 0    metroNIDAZOLE (FLAGYL) 500 MG tablet, Take 1 tablet by mouth 3 (Three) Times a Day for 7 days., Disp: 21 tablet, Rfl: 0    sulfamethoxazole-trimethoprim (BACTRIM DS,SEPTRA DS) 800-160 MG per tablet, Take 1 tablet by mouth 2 (Two) Times a Day for 7 days., Disp: 14 tablet, Rfl: 0    triamcinolone (KENALOG) 0.1 % cream, Apply 1 application topically to the appropriate area as directed 2 (Two) Times a Day., Disp: 80 g, Rfl: 0    cetirizine (zyrTEC) 10 MG tablet, Take 1 tablet by mouth 2 (Two) Times a Day  As Needed for Allergies., Disp: 60 tablet, Rfl: 0    Past Medical History:   Diagnosis Date    Achilles tendinitis     Bladder Cancer     2016/ 2019 Bladder Tumor Sx----------Dr. Saldana     Cervical cancer     CHOL     DDD, lumbar     DEXA     2013 = (0.8/ -0.1)/ 2020= (-1.2/ -1.7); 2024= (-1.1/ 1.2)    Eczema     HL (hearing loss)     Wear hearing aids    HTN     Lower back pain     MAMMO     NEG = 2019/ 2020/ 2021/ 2022/ 2024    Mitral valve prolapse     Parathyroid adenoma     s/p Sx    Pulmonary nodule     Renal Cell Cancer     S/P Left Nephrectomy    Rheumatic fever     Scoliosis     Vitamin D deficiency        Past Surgical History:   Procedure Laterality Date    ADENOIDECTOMY      APPENDECTOMY      BLADDER TUMOR EXCISION  05/2019    x 2    CHOLECYSTECTOMY      COLONOSCOPY      2018= NEG, rech 2028?         GSI    NEPHRECTOMY  09/2009    left nephrectomy    PARATHYROID GLAND SURGERY  12/2020    TONSILLECTOMY      TOTAL ABDOMINAL HYSTERECTOMY WITH SALPINGO OOPHORECTOMY         Family History   Problem Relation Age of Onset    Cancer Mother         She had gallbladder cancer, liver    Gallbladder disease Mother         CANCER    Cancer Father 77        Brain tumor - cancer    No Known Problems Sister     No Known Problems Brother        Social History     Socioeconomic History    Marital status:    Tobacco Use    Smoking status: Never     Passive exposure: Never    Smokeless tobacco: Never   Vaping Use    Vaping status: Never Used   Substance and Sexual Activity    Alcohol use: No    Drug use: No    Sexual activity: Yes     Partners: Male     Birth control/protection: None     Comment: I am 76 years old not worried about getting pregnant.       History of Present Illness  The patient is a 79-year-old female here for follow-up from urgent care for a right forearm cat bite infection and complaining of posterior trunk pruritus.    She sustained a cat bite approximately 5 days prior to her visit to urgent care.  The wound began to exhibit signs of inflammation, including redness, on Saturday. She sought medical attention on Sunday. She was advised to monitor for the development of red streaks extending up her arm. She reports that the wound was initially localized to her wrist but has since spread.     She describes the wound as hot to touch, with the presence of small blisters and associated itching. She has been scratching the wound with her nails. She reports no current throbbing pain in the wound. She was prescribed a regimen of sulfa and Flagyl, to be taken three times daily for 7 days, and twice daily for 7 days respectively. She is currently on the third day of this treatment. She also received a tetanus booster. She has been elevating her hand to alleviate the throbbing sensation.     She also reports experiencing itching on her back, which she has been scratching. The onset of this symptom was 2 to 3 days prior to her visit to urgent care. She has been applying triamcinolone cream to the affected area. She also reports itching on her buttocks, for which she has been using a spray that provides immediate relief. She has a history of dry skin, particularly on her legs, and uses lotion to manage this condition. She does not use a water softener at home. She has been taking Zyrtec daily for her allergies and has been hesitant to add Benadryl to her regimen. She has a prescription for hydroxyzine 25 mg, which she takes as needed for itching spells. She reports that her symptoms improved after starting the allergy medication.    She has not been monitoring her blood pressure at home. She recalls a previous reading of 111 systolic during a clinic visit. However, her blood pressure was significantly elevated at 178/72 during her visit to urgent care.    Supplemental Information  She has not received the RSV vaccine or the influenza vaccine, but has received the COVID-19 vaccine. She has been taking Mucinex for her throat,  which has been beneficial.    ALLERGIES  The patient is allergic to CALAMINE POWDER and AUGMENTIN.    MEDICATIONS  Current: Sulfa, Flagyl, triamcinolone, Zyrtec, Mucinex, Claritin    IMMUNIZATIONS  She received a tetanus shot recently. She has not received the RSV vaccine or the influenza vaccine. She has received the COVID-19 vaccine.        The following portions of the patient's history were reviewed and updated as appropriate: allergies, current medications, past family history, past medical history, past social history, past surgical history and problem list.    Review of Systems   Constitutional:  Negative for chills, diaphoresis, fatigue and fever.   HENT:  Negative for congestion, sore throat and swollen glands.    Respiratory:  Negative for cough.    Cardiovascular:  Negative for chest pain.   Gastrointestinal:  Negative for abdominal pain, nausea and vomiting.   Genitourinary:  Negative for dysuria.   Musculoskeletal:  Negative for myalgias and neck pain.   Skin:  Positive for color change and wound. Negative for rash.   Neurological:  Negative for weakness and numbness.       Vitals:    01/14/25 1001   BP: 148/84   Pulse: 89   Resp: 14   Temp: 98.4 °F (36.9 °C)   SpO2: 99%       Objective   Physical Exam  Vitals and nursing note reviewed.   Constitutional:       General: She is not in acute distress.     Appearance: She is not ill-appearing or toxic-appearing.   HENT:      Head: Normocephalic and atraumatic.   Pulmonary:      Effort: Pulmonary effort is normal.          Comments: +erythematous excoriated papular rash at upper and mid trunk   Musculoskeletal:        Arms:       Comments: Dull erythematous patch at Rt dorsal forearm w/ some swelling-----area marked w/ sharpie   Skin:     Findings: Erythema present.   Neurological:      Mental Status: She is alert and oriented to person, place, and time. Mental status is at baseline.   Psychiatric:         Attention and Perception: Attention and perception  normal.         Mood and Affect: Mood and affect normal.         Speech: Speech normal.         Behavior: Behavior normal. Behavior is cooperative.         Thought Content: Thought content normal.         Cognition and Memory: Cognition and memory normal.         Judgment: Judgment normal.       Physical Exam  Vital Signs  Blood pressure = 148/84.     Assessment & Plan   Diagnoses and all orders for this visit:    1. Cat bite, subsequent encounter (Primary)  -     C-reactive Protein  -     Sedimentation Rate  -     CBC & Differential    2. Pruritus    3. Elevated blood pressure reading with diagnosis of hypertension    Other orders  -     cetirizine (zyrTEC) 10 MG tablet; Take 1 tablet by mouth 2 (Two) Times a Day As Needed for Allergies.  Dispense: 60 tablet; Refill: 0      Assessment & Plan  1. Cat bite infection on the right forearm.  She will continue her current antibiotic regimen for an additional 2 days. Laboratory tests will be conducted today to assess the progress of the infection. If there is no improvement, a transition to moxifloxacin, to be taken once daily, will be considered. She is advised to elevate her hand and apply a cold pack to the affected area.    2. Pruritus on the posterior trunk.  She is advised to apply a cold pack to the itchy area. She is instructed to avoid scratching the area. A prescription for Zyrtec 10 mg, to be taken twice daily as needed, will be provided. She is also advised to apply triamcinolone cream to the affected area. Laboratory tests will be conducted today.    3. Elevated blood pressure.  Her blood pressure reading today was 148/84. She is advised to monitor her blood pressure at home and report any significant changes.     Results            Patient or patient representative verbalized consent for the use of Ambient Listening during the visit with  Natalie Garcia DO for chart documentation. 1/19/2025  21:26 EST

## 2025-01-15 LAB
BASOPHILS # BLD AUTO: 0.05 10*3/MM3 (ref 0–0.2)
BASOPHILS NFR BLD AUTO: 0.6 % (ref 0–1.5)
CRP SERPL-MCNC: 8.52 MG/DL (ref 0–0.5)
DEPRECATED RDW RBC AUTO: 39.7 FL (ref 37–54)
EOSINOPHIL # BLD AUTO: 0.09 10*3/MM3 (ref 0–0.4)
EOSINOPHIL NFR BLD AUTO: 1 % (ref 0.3–6.2)
ERYTHROCYTE [DISTWIDTH] IN BLOOD BY AUTOMATED COUNT: 12.1 % (ref 12.3–15.4)
ERYTHROCYTE [SEDIMENTATION RATE] IN BLOOD: 22 MM/HR (ref 0–30)
HCT VFR BLD AUTO: 40.4 % (ref 34–46.6)
HGB BLD-MCNC: 13.8 G/DL (ref 12–15.9)
IMM GRANULOCYTES # BLD AUTO: 0.05 10*3/MM3 (ref 0–0.05)
IMM GRANULOCYTES NFR BLD AUTO: 0.6 % (ref 0–0.5)
LYMPHOCYTES # BLD AUTO: 1.94 10*3/MM3 (ref 0.7–3.1)
LYMPHOCYTES NFR BLD AUTO: 21.6 % (ref 19.6–45.3)
MCH RBC QN AUTO: 30.7 PG (ref 26.6–33)
MCHC RBC AUTO-ENTMCNC: 34.2 G/DL (ref 31.5–35.7)
MCV RBC AUTO: 90 FL (ref 79–97)
MONOCYTES # BLD AUTO: 0.62 10*3/MM3 (ref 0.1–0.9)
MONOCYTES NFR BLD AUTO: 6.9 % (ref 5–12)
NEUTROPHILS NFR BLD AUTO: 6.25 10*3/MM3 (ref 1.7–7)
NEUTROPHILS NFR BLD AUTO: 69.3 % (ref 42.7–76)
NRBC BLD AUTO-RTO: 0 /100 WBC (ref 0–0.2)
PLATELET # BLD AUTO: 232 10*3/MM3 (ref 140–450)
PMV BLD AUTO: 11.2 FL (ref 6–12)
RBC # BLD AUTO: 4.49 10*6/MM3 (ref 3.77–5.28)
WBC NRBC COR # BLD AUTO: 9 10*3/MM3 (ref 3.4–10.8)

## 2025-01-17 ENCOUNTER — OFFICE VISIT (OUTPATIENT)
Dept: FAMILY MEDICINE CLINIC | Facility: CLINIC | Age: 80
End: 2025-01-17
Payer: MEDICARE

## 2025-01-17 VITALS
OXYGEN SATURATION: 99 % | HEART RATE: 84 BPM | RESPIRATION RATE: 14 BRPM | BODY MASS INDEX: 28.34 KG/M2 | DIASTOLIC BLOOD PRESSURE: 72 MMHG | TEMPERATURE: 98.2 F | WEIGHT: 166 LBS | SYSTOLIC BLOOD PRESSURE: 123 MMHG | HEIGHT: 64 IN
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
## 2025-01-17 DIAGNOSIS — R79.82 CRP ELEVATED: ICD-10-CM

## 2025-01-17 PROCEDURE — 86140 C-REACTIVE PROTEIN: CPT | Performed by: FAMILY MEDICINE

## 2025-01-17 NOTE — PROGRESS NOTES
Venipuncture performed in left arm by Tosha Reyes CMA  with good hemostasis. Patient tolerated well. 01/17/25 Tosha Reyes CMA

## 2025-01-18 LAB — CRP SERPL-MCNC: 1.24 MG/DL (ref 0–0.5)

## 2025-03-13 ENCOUNTER — OFFICE VISIT (OUTPATIENT)
Dept: FAMILY MEDICINE CLINIC | Facility: CLINIC | Age: 80
End: 2025-03-13
Payer: MEDICARE

## 2025-03-13 DIAGNOSIS — Z00.00 MEDICARE ANNUAL WELLNESS VISIT, SUBSEQUENT: Primary | ICD-10-CM

## 2025-03-13 DIAGNOSIS — Z12.31 ENCOUNTER FOR SCREENING MAMMOGRAM FOR MALIGNANT NEOPLASM OF BREAST: ICD-10-CM

## 2025-03-13 DIAGNOSIS — L30.9 DERMATITIS: ICD-10-CM

## 2025-03-13 DIAGNOSIS — I10 PRIMARY HYPERTENSION: ICD-10-CM

## 2025-03-13 DIAGNOSIS — E78.5 DYSLIPIDEMIA: ICD-10-CM

## 2025-03-13 RX ORDER — CETIRIZINE HYDROCHLORIDE 10 MG/1
10 TABLET ORAL DAILY PRN
Qty: 90 TABLET | Refills: 0 | Status: SHIPPED | OUTPATIENT
Start: 2025-03-13

## 2025-03-13 RX ORDER — CLOBETASOL PROPIONATE 0.5 MG/G
1 CREAM TOPICAL 2 TIMES DAILY
Qty: 60 G | Refills: 0 | Status: SHIPPED | OUTPATIENT
Start: 2025-03-13

## 2025-03-13 RX ORDER — AMLODIPINE BESYLATE 2.5 MG/1
7.5 TABLET ORAL DAILY
Qty: 90 TABLET | Refills: 0 | Status: SHIPPED | OUTPATIENT
Start: 2025-03-13

## 2025-03-13 NOTE — PROGRESS NOTES
The ABCs of the Annual Wellness Visit  Medicare Visit    Subjective     Sandra Gonzalez is a 79 y.o. female who presents for an Annual Medicare Visit.    The following portions of the patient's history were reviewed and   updated as appropriate: allergies, current medications, past family history, past medical history, past social history, past surgical history, and problem list.     Compared to one year ago, the patient feels her physical   health is the same.    Compared to one year ago, the patient feels her mental   health is the same.    Recent Hospitalizations:  This patient has had a [facility] admission record on file within the last 365 days.    Current Medical Providers:  Patient Care Team:  Natalie Garcia DO as PCP - General (Family Medicine)  Jorge Oakes MD as Consulting Physician (Cardiology)  Donald Sibley MD as Consulting Physician (Dermatology)  Milton Saldana MD as Consulting Physician (Urology)  Sergey Elizabeth MD as Surgeon (Orthopedic Surgery)    Outpatient Medications Prior to Visit   Medication Sig Dispense Refill    hydrOXYzine (ATARAX) 25 MG tablet Take 1 tablet by mouth Daily As Needed for Itching. 40 tablet 0    triamcinolone (KENALOG) 0.1 % cream Apply 1 application topically to the appropriate area as directed 2 (Two) Times a Day. 80 g 0    amLODIPine (NORVASC) 2.5 MG tablet TAKE 3 TABLETS BY MOUTH EVERY DAY 90 tablet 0    cetirizine (zyrTEC) 10 MG tablet Take 1 tablet by mouth 2 (Two) Times a Day As Needed for Allergies. 60 tablet 0     No facility-administered medications prior to visit.       No opioid medication identified on active medication list. I have reviewed chart for other potential  high risk medication/s and harmful drug interactions in the elderly.        Aspirin is not on active medication list.  Aspirin use is not indicated based on review of current medical condition/s. Risk of harm outweighs potential benefits.  .    Patient Active Problem List  "  Diagnosis    Abnormal urinalysis    Achilles tendinitis    Allergic rhinitis, seasonal    Bladder cancer    Body mass index (BMI) of 25.0 to 29.9    Carcinoma, renal cell    Degeneration of intervertebral disc of lumbar region    Dyslipidemia    Hypertension    Low back pain    Mitral regurgitation    Sciatica    Vitamin D deficiency    Primary hyperparathyroidism    Acute laryngitis    Lab test positive for detection of COVID-19 virus     Advance Care Planning   Advance Care Planning     Advance Directive is on file.  ACP discussion was held with the patient during this visit. Pt to bring in copy       Objective   Vitals:    03/13/25 0946   BP: 148/70   BP Location: Right arm   Patient Position: Sitting   Cuff Size: Adult   Pulse: 71   Resp: 16   Temp: 97.5 °F (36.4 °C)   TempSrc: Infrared   SpO2: 100%   Weight: 76.2 kg (168 lb)   Height: 162.6 cm (64\")   PainSc: 0-No pain     Estimated body mass index is 28.84 kg/m² as calculated from the following:    Height as of this encounter: 162.6 cm (64\").    Weight as of this encounter: 76.2 kg (168 lb).    BMI is >= 25 and <30. (Overweight) The following options were offered after discussion;: exercise counseling/recommendations and nutrition counseling/recommendations      Does the patient have evidence of cognitive impairment?   No         Procedures       HEALTH RISK ASSESSMENT    Smoking Status:  Social History     Tobacco Use   Smoking Status Never    Passive exposure: Never   Smokeless Tobacco Never     Alcohol Consumption:  Social History     Substance and Sexual Activity   Alcohol Use No       Fall Risk Screen:    NISAADI Fall Risk Assessment was completed, and patient is at LOW risk for falls.Assessment completed on:3/13/2025    Depression Screen:       3/13/2025     9:49 AM   PHQ-2/PHQ-9 Depression Screening   Little interest or pleasure in doing things Not at all   Feeling down, depressed, or hopeless Not at all   How difficult have these problems made it for " you to do your work, take care of things at home, or get along with other people? Not difficult at all       Health Habits and Functional and Cognitive Screening:      3/13/2025     9:00 AM   Functional & Cognitive Status   Do you have difficulty preparing food and eating? No   Do you have difficulty bathing yourself, getting dressed or grooming yourself? No   Do you have difficulty using the toilet? No   Do you have difficulty moving around from place to place? No   Do you have trouble with steps or getting out of a bed or a chair? No   Current Diet Well Balanced Diet   Dental Exam Up to date   Eye Exam Up to date   Exercise (times per week) 0 times per week   Current Exercises Include No Regular Exercise   Do you need help using the phone?  No   Are you deaf or do you have serious difficulty hearing?  Yes   Do you need help to go to places out of walking distance? No   Do you need help shopping? No   Do you need help preparing meals?  No   Do you need help with housework?  No   Do you need help with laundry? No   Do you need help taking your medications? No   Do you need help managing money? No   Do you ever drive or ride in a car without wearing a seat belt? No   Have you felt unusual stress, anger or loneliness in the last month? No   Who do you live with? Spouse   If you need help, do you have trouble finding someone available to you? No   Have you been bothered in the last four weeks by sexual problems? No   Do you have difficulty concentrating, remembering or making decisions? No       Visual Acuity:    No results found.    Age-appropriate Screening Schedule:  Refer to the list below for future screening recommendations based on patient's age, sex and/or medical conditions. Orders for these recommended tests are listed in the plan section. The patient has been provided with a written plan.    Health Maintenance   Topic Date Due    HEPATITIS C SCREENING  Never done    RSV Vaccine - Adults (1 - 1-dose 75+  series) Never done    LIPID PANEL  02/19/2025    BMI FOLLOWUP  02/21/2025    ANNUAL WELLNESS VISIT  03/13/2026    DXA SCAN  03/15/2026    TDAP/TD VACCINES (2 - Td or Tdap) 01/12/2035    INFLUENZA VACCINE  Completed    Pneumococcal Vaccine 50+  Completed    ZOSTER VACCINE  Completed    COVID-19 Vaccine  Discontinued    MAMMOGRAM  Discontinued    COLORECTAL CANCER SCREENING  Discontinued        CMS Preventative Services Quick Reference  Risk Factors Identified During Encounter    Hearing Problem:  has aids  Immunizations Discussed/Encouraged: Influenza, Prevnar 20 (Pneumococcal 20-valent conjugate), Shingrix, COVID19, and RSV (Respiratory Syncytial Virus)  Dental Screening Recommended  Vision Screening Recommended  The above risks/problems have been discussed with the patient.  Pertinent information has been shared with the patient in the After Visit Summary.    Follow Up:   Initial Medicare Visit in one year    An After Visit Summary and PPPS were made available to the patient.      Additional E&M Note during same encounter follows:  Patient has multiple medical problems which are significant and separately identifiable that require additional work above and beyond the Medicare Wellness Visit.      Chief Complaint  Medicare Wellness-subsequent    Subjective        History of Present Illness  The patient is a 79-year-old female who presents for a Medicare wellness visit.    She has been experiencing persistent itching on her scalp and breast, which has not been alleviated by the application of steroid cream twice daily for over a week. She also reports using a gel-like substance on her hair after a recent haircut, which she suspects may be contributing to the itching. Additionally, she applies lotion to her legs and arms post-shower due to sensitive skin. She uses unscented laundry detergent. She has been under the care of Dr. Sibley for dermatological issues.    She has been under the care of Dr. Oakes for cardiac  issues. She is currently on amlodipine 7.5 mg for blood pressure management, which she took this morning. She owns a home blood pressure monitor, which she believes to be accurate, and reports readings ranging from 128 to 145 systolic. However, she does not monitor her blood pressure frequently.    She has been under the care of Dr. Saldana for urological issues.    She has been under the care of Dr. Elizabeth and Dr. Pozo for knee issues. She received injections in October and December, which did not provide relief. She discussed the possibility of a gel injection with Dr. Elizabeth, but her insurance declined coverage. She is awaiting a call from Dr. Pozo' office regarding insurance approval for the gel injection. She prefers this treatment option over surgery. She reports that her physical health has remained stable compared to the previous year. She has a living will in place. She acknowledges that her exercise routine is suboptimal due to knee pain, but she manages to climb stairs. She does not engage in walking or pool exercises. She states that she would walk more if her knees were better.    She has been advised to undergo a mammogram after 03/15/2025. She has no history of anemia and does not take any vitamins. She reports that her mental health has remained stable compared to the previous year. She has hearing aids, which are functioning well, and she regularly visits her dentist, with her most recent appointment last week. She had an eye examination less than a year ago, which showed no signs of glaucoma. She has been using the same pair of hearing aids for the past 4 to 5 years. She is not fasting today. She has a history of elevated ferritin levels but has never taken iron supplements. She is not interested in getting tested for hepatitis C. She is considering getting the COVID-19 booster vaccine this year.    She takes Zyrtec 10 mg every other day for runny nose and itching. She has a prescription for hydroxyzine  "25 mg, which she takes as needed for itching, typically at night before bed.    SOCIAL HISTORY  She is a non-smoker, does not consume alcohol, and does not use drugs.    FAMILY HISTORY  She has 4 sisters and 1 brother, all of whom are living. Her brother had knee surgery due to arthritis. No family history of blood pressure issues or cancers. No family history of glaucoma.    ALLERGIES  She is allergic to BENZOCAINE, CALAMINE, POWDER, AUGMENTIN, and OXYCODONE.    MEDICATIONS  Current: Amlodipine, Zyrtec, hydroxyzine, steroid cream.              Objective   Vital Signs:  /70 (BP Location: Right arm, Patient Position: Sitting, Cuff Size: Adult)   Pulse 71   Temp 97.5 °F (36.4 °C) (Infrared)   Resp 16   Ht 162.6 cm (64\")   Wt 76.2 kg (168 lb)   SpO2 100%   BMI 28.84 kg/m²     Physical Exam  Vitals and nursing note reviewed.   Constitutional:       General: She is not in acute distress.     Appearance: She is not ill-appearing or toxic-appearing.   HENT:      Head: Normocephalic and atraumatic.        Comments: +rough scaling patch at post occip hairline  Cardiovascular:      Rate and Rhythm: Normal rate and regular rhythm.      Heart sounds: No murmur heard.  Pulmonary:      Effort: Pulmonary effort is normal. No respiratory distress.      Breath sounds: No wheezing, rhonchi or rales.   Chest:          Comments: +dull erythematous macular rash at Left breast superior aspect  Neurological:      Mental Status: She is alert and oriented to person, place, and time.      Cranial Nerves: No cranial nerve deficit.   Psychiatric:         Attention and Perception: Attention and perception normal.         Mood and Affect: Mood and affect normal.         Speech: Speech normal.         Behavior: Behavior normal. Behavior is cooperative.         Thought Content: Thought content normal.         Cognition and Memory: Cognition and memory normal.         Judgment: Judgment normal.      Physical Exam    Vital Signs  Blood " pressure is 148/70.        Assessment and Plan   Diagnoses and all orders for this visit:    1. Medicare annual wellness visit, subsequent (Primary)    2. Dermatitis    3. Primary hypertension  -     Comprehensive Metabolic Panel; Future    4. Dyslipidemia  -     Lipid Panel; Future    5. Encounter for screening mammogram for malignant neoplasm of breast  -     Mammo Screening Digital Tomosynthesis Bilateral With CAD; Future    Other orders  -     clobetasol propionate (TEMOVATE) 0.05 % cream; Apply 1 Application topically to the appropriate area as directed 2 (Two) Times a Day. May use up to 2 weeks at a time  Dispense: 60 g; Refill: 0  -     amLODIPine (NORVASC) 2.5 MG tablet; Take 3 tablets by mouth Daily.  Dispense: 90 tablet; Refill: 0  -     cetirizine (zyrTEC) 10 MG tablet; Take 1 tablet by mouth Daily As Needed for Allergies or Rhinitis.  Dispense: 90 tablet; Refill: 0      Assessment & Plan  1. Pruritus.  Her scalp exhibits mild dryness, which is not severe. The pruritus on her breast appears to be a result of scratching. A prescription for a high-potency cream has been provided, with instructions to apply it twice daily for a maximum duration of 2 weeks. She has been advised to transition back to using only lotion once the pruritus subsides. She has also been instructed to apply Eucerin or Aquaphor to the affected areas. If the current treatment proves ineffective, an alternative will be considered.    2. Hypertension.  Her blood pressure is slightly elevated at 148/70. She has been advised to continue her current medication regimen, including amlodipine 7.5 mg. A prescription refill for amlodipine 7.5 mg has been sent to [facility] for a duration of 1 month. Her blood pressure will be rechecked during her upcoming lab visit. If her blood pressure remains uncontrolled, the dosage of amlodipine may be increased from 7.5 mg to 10 mg, or a combination of amlodipine 5 mg and benazepril may be considered.    3.  Urological issues.  She is under the care of Dr. Saldana for her urological issues.    4. Knee pain.  She has been under the care of Dr. Elizabeth and Dr. Pozo for knee issues. She is awaiting a call from Dr. Pozo' office regarding insurance approval for the gel injection. She prefers this treatment option over surgery. She reports that her physical health has remained stable compared to the previous year. She has been advised to avoid climbing stairs as it is not good for her knees.    5. Medicare wellness visit.  Her DEXA scan results from 2024 indicate osteopenia, but there has been an improvement since 2020. Her white blood cell count is within normal limits. She has been advised to maintain a healthy diet rich in fruits and vegetables. She has been informed that she is eligible for the RSV vaccine, which she can receive in the fall. She has been advised to bring a copy of her living will to the clinic. A mammogram has been ordered. A colonoscopy is scheduled for 2028. A lipid panel and CMP have been ordered. She has been advised to fast overnight for 8 to 12 hours prior to the lab work. Her blood pressure will be checked during her lab visit.    6. Allergic rhinitis.  A prescription for Zyrtec 10 mg has been provided, with instructions to take it once daily as needed for runny nose and itchy skin. A prescription for hydroxyzine 25 mg has been provided, with instructions to take it as needed for itching.    PROCEDURE  The patient received knee injections in October and December.     Results  Imaging  DEXA scan in 2024 showed osteopenia but some improvement from 2020.          Follow Up   No follow-ups on file.  Patient was given instructions and counseling regarding her condition or for health maintenance advice. Please see specific information pulled into the AVS if appropriate.   Patient or patient representative verbalized consent for the use of Ambient Listening during the visit with  Natalie Garcia DO for chart  documentation. 3/13/2025  11:09 EDT

## 2025-03-18 ENCOUNTER — CLINICAL SUPPORT (OUTPATIENT)
Dept: FAMILY MEDICINE CLINIC | Facility: CLINIC | Age: 80
End: 2025-03-18
Payer: MEDICARE

## 2025-03-18 VITALS
DIASTOLIC BLOOD PRESSURE: 72 MMHG | HEART RATE: 71 BPM | BODY MASS INDEX: 28.68 KG/M2 | SYSTOLIC BLOOD PRESSURE: 135 MMHG | TEMPERATURE: 97.5 F | OXYGEN SATURATION: 100 % | RESPIRATION RATE: 16 BRPM | WEIGHT: 168 LBS | HEIGHT: 64 IN

## 2025-03-18 VITALS — DIASTOLIC BLOOD PRESSURE: 72 MMHG | SYSTOLIC BLOOD PRESSURE: 135 MMHG | HEART RATE: 62 BPM

## 2025-03-18 DIAGNOSIS — E78.5 DYSLIPIDEMIA: ICD-10-CM

## 2025-03-18 DIAGNOSIS — I10 PRIMARY HYPERTENSION: ICD-10-CM

## 2025-03-18 PROCEDURE — 80061 LIPID PANEL: CPT | Performed by: FAMILY MEDICINE

## 2025-03-18 PROCEDURE — 36415 COLL VENOUS BLD VENIPUNCTURE: CPT | Performed by: FAMILY MEDICINE

## 2025-03-18 PROCEDURE — 80053 COMPREHEN METABOLIC PANEL: CPT | Performed by: FAMILY MEDICINE

## 2025-03-18 NOTE — PROGRESS NOTES
135/72 hr 62     Venipuncture performed in L ARM by RT Janie  with good hemostasis. Patient tolerated well. 03/18/25 Stefany Sandhu, RT

## 2025-03-19 LAB
ALBUMIN SERPL-MCNC: 4.2 G/DL (ref 3.5–5.2)
ALBUMIN/GLOB SERPL: 1.6 G/DL
ALP SERPL-CCNC: 63 U/L (ref 39–117)
ALT SERPL W P-5'-P-CCNC: 17 U/L (ref 1–33)
ANION GAP SERPL CALCULATED.3IONS-SCNC: 6 MMOL/L (ref 5–15)
AST SERPL-CCNC: 24 U/L (ref 1–32)
BILIRUB SERPL-MCNC: 0.5 MG/DL (ref 0–1.2)
BUN SERPL-MCNC: 18 MG/DL (ref 8–23)
BUN/CREAT SERPL: 15.3 (ref 7–25)
CALCIUM SPEC-SCNC: 9.5 MG/DL (ref 8.6–10.5)
CHLORIDE SERPL-SCNC: 106 MMOL/L (ref 98–107)
CHOLEST SERPL-MCNC: 236 MG/DL (ref 0–200)
CO2 SERPL-SCNC: 28 MMOL/L (ref 22–29)
CREAT SERPL-MCNC: 1.18 MG/DL (ref 0.57–1)
EGFRCR SERPLBLD CKD-EPI 2021: 47.7 ML/MIN/1.73
GLOBULIN UR ELPH-MCNC: 2.6 GM/DL
GLUCOSE SERPL-MCNC: 88 MG/DL (ref 65–99)
HDLC SERPL-MCNC: 61 MG/DL (ref 40–60)
LDLC SERPL CALC-MCNC: 162 MG/DL (ref 0–100)
LDLC/HDLC SERPL: 2.61 {RATIO}
POTASSIUM SERPL-SCNC: 4.8 MMOL/L (ref 3.5–5.2)
PROT SERPL-MCNC: 6.8 G/DL (ref 6–8.5)
SODIUM SERPL-SCNC: 140 MMOL/L (ref 136–145)
TRIGL SERPL-MCNC: 78 MG/DL (ref 0–150)
VLDLC SERPL-MCNC: 13 MG/DL (ref 5–40)

## 2025-03-28 ENCOUNTER — HOSPITAL ENCOUNTER (OUTPATIENT)
Dept: ULTRASOUND IMAGING | Facility: HOSPITAL | Age: 80
Discharge: HOME | End: 2025-03-28
Payer: MEDICARE

## 2025-03-28 DIAGNOSIS — N28.9 RENAL INSUFFICIENCY: ICD-10-CM

## 2025-03-28 PROCEDURE — 76775 US EXAM ABDO BACK WALL LIM: CPT

## 2025-03-31 LAB
NCCN CRITERIA FLAG: NORMAL
TYRER CUZICK SCORE: 1.7

## 2025-04-01 ENCOUNTER — HOSPITAL ENCOUNTER (OUTPATIENT)
Dept: MAMMOGRAPHY | Facility: HOSPITAL | Age: 80
Discharge: HOME | End: 2025-04-01
Admitting: FAMILY MEDICINE
Payer: MEDICARE

## 2025-04-01 DIAGNOSIS — Z12.31 ENCOUNTER FOR SCREENING MAMMOGRAM FOR MALIGNANT NEOPLASM OF BREAST: ICD-10-CM

## 2025-04-01 PROCEDURE — 77063 BREAST TOMOSYNTHESIS BI: CPT

## 2025-04-01 PROCEDURE — 77067 SCR MAMMO BI INCL CAD: CPT

## 2025-04-07 RX ORDER — AMLODIPINE BESYLATE 2.5 MG/1
7.5 TABLET ORAL DAILY
Qty: 90 TABLET | Refills: 0 | Status: SHIPPED | OUTPATIENT
Start: 2025-04-07

## 2025-04-30 RX ORDER — METHYLPREDNISOLONE 4 MG/1
TABLET ORAL
Qty: 21 TABLET | Refills: 0 | Status: SHIPPED | OUTPATIENT
Start: 2025-04-30